# Patient Record
Sex: FEMALE | Race: WHITE | NOT HISPANIC OR LATINO | Employment: OTHER | URBAN - METROPOLITAN AREA
[De-identification: names, ages, dates, MRNs, and addresses within clinical notes are randomized per-mention and may not be internally consistent; named-entity substitution may affect disease eponyms.]

---

## 2017-01-04 RX ORDER — TOPIRAMATE 25 MG/1
TABLET ORAL
Qty: 180 TABLET | Refills: 3 | Status: SHIPPED | OUTPATIENT
Start: 2017-01-04 | End: 2017-10-10 | Stop reason: SDUPTHER

## 2017-01-05 ENCOUNTER — OFFICE VISIT (OUTPATIENT)
Dept: WOUND CARE | Facility: HOSPITAL | Age: 67
End: 2017-01-05
Attending: SURGERY

## 2017-01-05 PROCEDURE — G0463 HOSPITAL OUTPT CLINIC VISIT: HCPCS

## 2017-01-06 PROCEDURE — G0463 HOSPITAL OUTPT CLINIC VISIT: HCPCS

## 2017-01-06 NOTE — WOUND CARE CLINIC NOTE
DATE OF VISIT: 01/05/2017    CHIEF COMPLAINT: Traumatic injury resulting in a wound of the right mid anterior calf (L97.213).    HISTORY OF PRESENT ILLNESS: This is a 66-year-old female who has multiple other medical troubles including need for chronic anticoagulation on Coumadin. She had some trauma to the mid anterior calf in the 1st portion of November. She developed a large hematoma, and initially this was not opened and the physicians that saw her just followed it. It later spontaneously drained, and then later a large eschar formed and then it came off. She was placed on antibiotic of clindamycin for cellulitis on 11/15/2016. It sounds like she has just been using some kind of gauze and cleaning it. Her  apparently has been doing a pretty good job of taking care of it, but no other special treatment. She denies fever, chills, or purulent drainage. She cannot walk that well. She has a lot of other medical troubles. Many of these go back to over 20 years ago when she had a horse accident, and she only walks to the bathroom with severe pain. This has been this way for years. She also has significant lung disease, and she is on prednisone and nasal oxygen. She does deny history of diabetes, but she does have a history of multiple deep venous thromboses of her legs, history of congestive heart failure, and stomach ulcers which are controlled with Protonix.    ALLERGIES:   1. NAPROSYN.   2. LEVAQUIN.   3. MEPERIDINE.   4. PNEUMOCOCCAL VACCINATIONS.   5. SULFA DRUGS.     MEDICATIONS:   1. Allopurinol.   2. Baclofen.  3. Cymbalta.  4. Lasix.  5. Hydrocodone.  6. Levothyroxine.   7. Lisinopril.   8. Reglan.  9. Protonix.  10. K-Dur.  11. Potassium.  12. Zanaflex.  13. Topamax.  14. Coumadin.    REVIEW OF SYSTEMS/PAST MEDICAL HISTORY:   1. As already mentioned in history of present illness.  2. Anemia.  3. Asthma.  4. Hypertension.  5. Venous insufficiency.  6. Multiple pneumonias.  7. Depression.  8.  Degenerative lumbar disease.  9. GERD.  10. Fibromyalgia.  11. Gastric ulcer.  12. Gastroparesis.  13. Low thyroid.   14. Metabolic encephalopathy.  15. Migraines.  16. Neuropathies.  17. DVTs.  18. 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  19. Renal impairment stage II.  20. Sleep apnea on CPAP.     PAST SURGICAL HISTORY:   1. Inferior vena cava filter placement in 2014.  2. Hip surgery.  3. Hysterectomy.     SOCIAL HISTORY: She is  and lives with her , who does take care of her. She denies smoking, alcohol and caffeine.     PHYSICAL EXAMINATION:  GENERAL: This patient is a somewhat debilitated and is moderately wheelchair-bound. She is alert, oriented and cooperative.   VITAL SIGNS: Temperature 97.8, pulse 75, respirations 24, and blood pressure 143/82.   HEENT: Pupils equal, round, and reactive to light. Nose and throat are clear. No masses.   NECK: Nontender. Trachea is in the midline. No thyroid palpable. No carotid bruits.   CHEST: Reveals distant heart distant breath sounds, but no obvious wheezes, rales, or other abnormal sounds.   HEART: Reveals distant sounds but regular rhythm. No obvious murmur.   ABDOMEN: Soft, nontender. No apparent masses.  EXTREMITIES: The patient has bilateral movement of all 4 extremities but does have weakness and disabilities and reduction in her ability to move due to discomfort. She does not have significant neuropathies of her feet. Examination of her lower extremities reveals that both legs have some edema, but there is significant edema on the right side. Left side is not significant. She has palpable pulses faintly. The right leg shows some redness, but there is not cellulitis. The wound is in the center portion of the midportion of the calf on the right leg. There is a lot of slough and necrotic tissue, eschar, and debris apparently down into the muscle. There is some tunneling and undermining on the lateral surface of the wound. The wound measures 6 by 4 x 1 cm.      DESCRIPTION OF PROCEDURE: Informed consent was given for excisional debridement of the wound of her right mid anterior calf to remove the eschar, slough, necrotic tissue and debris down to the level including some muscle, Risk. complications and benefits were explained. Then 4% lidocaine was placed on the wound for topical anesthesia. Using a 4 mm curette, this was selectively debrided, removing a significant amount of eschar debris, slough, and necrotic tissue although the way down and including some muscle. There was some undermining and tunneling on the lateral aspect tunneled approximately 1.5 cm from top to bottom of the wound. This was also cleaned out. There was moderate bleeding due to her anticoagulation with Coumadin, but this was controlled with pressure. The final wound size after debridement was 6 x 4 x 1.2 cm. The total surface area that was debrided was approximately 24 sq cm in this excisional debridement down to the muscle and including muscle. The final wound was much , although there was some still residual debris and eschar that was adherent. The bleeding was controlled with pressure. The patient tolerated it very well.     This patient has a lot of other medical troubles including that she is on prednisone, and it was failed to be mentioned in her history that she also has scleroderma, which was also mot mentioned in the examination, but this was visible on some of her toes. They have the typical scleroderma thin and shiny appearance of digits affected with scleroderma, so scleroderma is also a component of this problem. Hopefully this debridement was significant and most of the debris was removed. I am going to put her on Iodosorb, add foam silver alginate pad with Kerlix and an Ace toes to knees on her right lower extremity. Depending on how she tolerates this, we may want to wrap her up more, but I think this will probably be okay. She, I think, would tolerate more of venous  compression if needed, but she is going to change this dressing every day, so I think that will probably be reasonable to just do this every day and use Ace wraps. We will see her again next week. She is to call if she has any further problems.        Yon Anglin MD  TOR:ben  D:   01/05/2017 18:03:04  T:   01/06/2017 09:01:17  Job ID:   [id number]  Document ID:   [id number]  Rev:  0  cc:

## 2017-01-12 ENCOUNTER — APPOINTMENT (OUTPATIENT)
Dept: WOUND CARE | Facility: HOSPITAL | Age: 67
End: 2017-01-12
Attending: SURGERY

## 2017-01-19 ENCOUNTER — OFFICE VISIT (OUTPATIENT)
Dept: WOUND CARE | Facility: HOSPITAL | Age: 67
End: 2017-01-19
Attending: SURGERY

## 2017-01-20 NOTE — WOUND CARE CLINIC NOTE
Date of Service: 01/19/2017    CHIEF COMPLAINT: Traumatic injury resulting in a wound of the right midanterior calf (L97.213).     HISTORY OF PRESENT ILLNESS: This is a 66-year-old female who has multiple other medical problems including the need for chronic anticoagulation and is on Coumadin. She had some trauma to her mid right anterior calf in the first part of 11/2016, she developed a large hematoma, and initially this was not opened, but it then spontaneously drained. It later had a large eschar form and then it came off. She was placed on antibiotics of clindamycin, cellulitis on 11/15/2016. It sounds like she has just been using some kind of gauze and cleaning it. Her  has apparently been doing a pretty good job of taking care of it, but no other special treatment. She denies fever, chills, or purulent drainage. She cannot walk that well and has a lot of other medical problems. Many of these go back to 20 years or so when she had a horse accident. She says she only walks to the bathroom, and then only with severe pain. She has significant lung disease and she is on prednisone and nasal oxygen. She does deny a history of diabetes, but she does have a history of multiple deep venous thromboses of her legs, history of congestive heart failure, and stomach ulcers which are controlled with Protonix. We debrided this last week and got it fairly clean. We then started her on Iodosorb and silver alginate pads and ACE wraps. She apparently tolerated this well and really did not have any real trouble and denies fever, chills, or signs of infection.     PHYSICAL EXAMINATION:   GENERAL: Somewhat emaciated and frail female on nasal oxygen. She is in no acute distress.   VITAL SIGNS: Temperature is 97.6, pulse 79, respirations 20, blood pressure 165/88.   EXTREMITIES: Her right lower extremity reveals that she has moderate swelling, but less than we saw last time. She has a weakly palpable pulse. The wound actually  is quite improved with much less necrotic tissue and much smaller. There was some significant undermining last week and this week there is really no undermining. The measurements today are 5.1 x 2.9 x 0.5 cm. There is actually hypergranulation tissue beginning to form, but not excessive. There is some debris that needs to be removed and we discussed this issue.     DESCRIPTION OF PROCEDURE: The patient was given informed consent for the necessity for doing an excisional debridement of some necrotic subcutaneous fat, slough, and eschar overlying the wound of her right lower extremity. A topical anesthetic of 4% lidocaine gel was placed, and using a 4 mm curette the wound was debrided, removing eschar, debris, slough, and some necrotic dead fat from the subcutaneous level of the wound. The total wound measurement following debridement was 5.2 x 3 x 0.6 cm. This is slightly larger than it was prior to the debridement. The total surface area of debridement was approximately 15.5 sq cm. The resulting wound was very clean. There was no undermining. The granulation tissue was a little excessive, but not to the point where it needed to be cauterized. I think if it gets worse it will probably need some further cauterization. Bleeding was controlled. The patient tolerated it very well.     ASSESSMENT AND PLAN: This patient has made significant improvement in the past week with TheraHoney and silver foam. We are going to continue that for another week. I am thinking that if next week it looks as good as it does today we may consider using collagen and possibly using Hydrofera Blue, partly because I think the Hydrofera Blue would help control the hypergranulation tissue. We will consider that. If not, we may have to do some silver nitrate treatment to the hypergranulation tissue also, but we would probably go to at least collagen next week.       Yon Anglin MD  TOR:ab  D:   01/19/2017 19:10:16  T:   01/20/2017  17:13:42  Job ID:   [id number]  Document ID:   [id number]  Rev:  0  cc:

## 2017-01-26 ENCOUNTER — OFFICE VISIT (OUTPATIENT)
Dept: WOUND CARE | Facility: HOSPITAL | Age: 67
End: 2017-01-26
Attending: SURGERY

## 2017-01-26 PROCEDURE — G0463 HOSPITAL OUTPT CLINIC VISIT: HCPCS

## 2017-01-27 NOTE — WOUND CARE CLINIC NOTE
DATE OF SERVICE: 01/26/2017     CHIEF COMPLAINT: Traumatic injury resulting in wound of the right mid anterior calf (L97.213).     HISTORY OF PRESENT ILLNESS: This is a 66-year-old female who has multiple other medical problems including the need for chronic anticoagulation, is on Coumadin. She had some trauma to her mid right anterior calf in the first part of  November 2016.  She has developed a large hematoma. Initially this was not opened but then it spontaneously drained. It later had a large eschar develop and then it came off.  She was placed on antibiotics of clindamycin for her cellulitis in November 2016.  It sounds like she was just using some kind of gauze and cleaning it as far as dressing. She really cannot walk a lot due to other medical troubles that go back some 20 years or more.  She said that she only walks  to the bathroom and then only with severe pain. She has significant lung disease and she is on prednisone and nasal oxygen. She does deny history of diabetes, but she does have a history of multiple deep venous thrombosis of her legs, history of congestive heart failure and stomach ulcers which are controlled with Protonix.  We debrided the wound extensively about 2 weeks ago and  it was fairly clean, started her on isosorbide and silver alginate. She tolerated it well.  Then we saw her last week, debrided it again.  It was not nearly as bad. We changed the wound to TheraHoney and silver foam. She returns today saying it feels a lot better.  She has had no real major pain and she had no fever or signs of infection.     PHYSICAL EXAMINATION:   GENERAL:  Reasonably well-nourished and developed female with nasal oxygen in place, in no acute distress.   VITAL SIGNS: Temperature is 98.1, pulse 80, respirations 16, blood pressure 117/80.   EXTREMITIES:  The wound on her right anterior shin, she has significant signs of healing and there is no necrotic tissue. The wound is much smaller. It  measures now 4.3 x 2.2 x 0.1 cm, and this is a significant improvement since last week. There is some beginning budding epithelium.    ASSESSMENT AND PLAN:  The patient did not need debridement today.  It was really very clean and granulating well, showing good signs of early closure. We are going to change her from TheraHoney today. We will start using collagen dressing and foam.  We will do this every 2 days for now and then we will see her back next week. She should wrap it lightly with an Ace wrap. The collagen and  cover with foam is going to be the dressing.         Yon Anglin MD  TOR:antonio  D:   01/26/2017 15:07:10  T:   01/27/2017 11:19:30  Job ID:   [id number]  Document ID:   [id number]  Rev:  1  cc:

## 2017-01-31 ENCOUNTER — TELEPHONE (OUTPATIENT)
Dept: INTERNAL MEDICINE | Facility: CLINIC | Age: 67
End: 2017-01-31

## 2017-01-31 NOTE — TELEPHONE ENCOUNTER
----- Message from Kimberli Nobles MA sent at 1/31/2017 11:13 AM EST -----  Contact: pt - Dr Herron's pt - RE: Order      ----- Message -----     From: Fany Vidal     Sent: 1/31/2017  10:33 AM       To: Kimberli Nobles MA    Pt calling and would like a return call regarding pt's nebulizer. Pt informs has had for several years. Pt informs that [facility] informed [facility] pt will need to machine as pt's nebulizer is to old to get supplies to fix. Pt was informed that  would need to fax order to pharmacy for insurance to cover. Could you please fax order for nebulizer and supplies to pt's pharmacy? Please advise. Thanks    Nebulizer machine -- Pt would like full size machine    [facility]  [address] [place] [address]  Phone # [phone]  Fax # [phone]      Pt #  [phone]

## 2017-02-02 ENCOUNTER — OFFICE VISIT (OUTPATIENT)
Dept: WOUND CARE | Facility: HOSPITAL | Age: 67
End: 2017-02-02
Attending: SURGERY

## 2017-02-02 PROCEDURE — G0463 HOSPITAL OUTPT CLINIC VISIT: HCPCS

## 2017-02-03 NOTE — WOUND CARE CLINIC NOTE
DATE OF SERVICE: 02/02/2017     CHIEF COMPLAINT: Traumatic injury resulting in a wound of right mid anterior calf (L97.213)     HISTORY OF PRESENT ILLNESS: This is a 66-year-old female who has had multiple other medical problems including the need for chronic anticoagulation and is on Coumadin. She had some trauma to her right mid anterior calf the first part of 11//2016. She developed a large hematoma. Initially this was not open but then it spontaneously drained. It later had a large eschar develop and the eschar came off. She was placed on antibiotics of clindamycin for her cellulitis in 11/2016. It sounds like she was just using some kind of gauze and cleaning it as far as a dressing. She cannot really walk a lot due to other medical problems that she has going back for 20 years or more. She said that she only walks to the bathroom and then only with severe pain. She has significant lung disease and is on prednisone and nasal oxygen. She does deny history of diabetes, but does have a history of deep venous thrombosis of her legs, history of congestive heart failure, and stomach ulcers which are controlled with Protonix. We debrided the wound extensively several weeks ago and has remained fairly clean. We used Iodosorb and silver alginate, and then more recently we used TheraHoney.  Last week we began her on collagen, and she claims that she has not had any problems except she said that when she removed the 1st dressing with collagen it was red, irritated, and itched and seemed like she was allergic to it. She then would have used TheraHoney but she then ran out of TheraHoney and then she used Iodosorb.  Other than this she has not had any problems.     PHYSICAL EXAMINATION:  VITAL SIGNS: Temperature is 97.6, pulse 75, respirations 20, blood pressure 140/83.   EXTREMITIES: The lower extremity looks actually better. There is still some swelling of her leg, which has been present and we are only treating with an  Ace.  The wound measures 4.2 x 2.2 x 0.1 cm. The redness is gone that she describes and does not appear there is any irritation, there is really good healthy granulation tissue, and there is really no need today to perform debridement.     ASSESSMENT AND PLAN: What I am thinking is that she may have been given Puracol with silver and she may have had a silver allergy or possibly there was silver in the alginate, and it sounds like she had some type of topical dermatitis. We are going to try it one more time to use just pure collagen and pure alginate without silver and see if she has a similar problem. It was not a severe problem, so I think if she has it she can just stop it, and then she has the TheraHoney and she can go on to TheraHoney.  We gave her some more today, but I am hoping that it was really silver that may have been in the product and she had an allergic response to because I think the collagen actually is a good way to go. If this gives her a problem, then will probably just use a simple alginate. The wound is continuing to heal and is very clean at this time.      Yon Anglin MD  TOR:tp  D:   02/02/2017 18:42:25  T:   02/03/2017 00:33:50  Job ID:   [id number]  Document ID:   [id number]  Rev:  1  cc:

## 2017-02-08 ENCOUNTER — TELEPHONE (OUTPATIENT)
Dept: INTERNAL MEDICINE | Facility: CLINIC | Age: 67
End: 2017-02-08

## 2017-02-08 NOTE — TELEPHONE ENCOUNTER
----- Message from Kimberli Nobles MA sent at 2/8/2017 11:32 AM EST -----      ----- Message -----     From: Ritika Ortiz MA     Sent: 2/8/2017  10:47 AM       To: Kimberli Nobles MA    Pt results called in today    INR 1.9 drawn yesterday

## 2017-02-09 ENCOUNTER — OFFICE VISIT (OUTPATIENT)
Dept: WOUND CARE | Facility: HOSPITAL | Age: 67
End: 2017-02-09
Attending: SURGERY

## 2017-02-09 PROCEDURE — G0463 HOSPITAL OUTPT CLINIC VISIT: HCPCS

## 2017-02-10 NOTE — WOUND CARE CLINIC NOTE
DATE OF SERVICE: 02/09/2017    CHIEF COMPLAINT: Traumatic injury resulting in a wound of right mid anterior calf (L97.213).     HISTORY OF PRESENT ILLNESS: This is a 66-year-old female who has had multiple other medical troubles including the need for chronic anticoagulation and is on Coumadin. She has had some trauma to the right mid anterior calf the first part of 11/2016. She developed a large hematoma. Initially this was not open but then it spontaneously drained. It later had a large eschar develop and the eschar came off. She was placed on antibiotics of clindamycin for cellulitis in 11/2016. It sounds like she was just using some kind of gauze and cleaning it as far as a dressing. She cannot really walk a lot due to other medical problems that she has had that go back more than 20 years. She said that she only walks to the bathroom and in only severe pain. She has significant lung disease and is on prednisone and nasal oxygen. She does deny a history of diabetes, but she does have a history of deep venous thrombosis of her legs, history of congestive heart failure, stomach ulcers which are controlled with Protonix. We debrided the wound extensively several weeks ago and this has remained fairly clean.  We used Iodosorb, silver alginate, and then more recently we used TheraHoney. We did try her on some  collagen but she had some problems, and it is felt that she likely had some collagen with silver and she had SILVER allergy. We have tried to use some other collagen since then, and she comes back saying that she did not have problems this time with the collagen or the alginate that did not contain silver. She has no other problems.     PHYSICAL EXAMINATION:  VITAL SIGNS: Temperature 98, pulse 68, respirations 18, blood pressure 145/74.   EXTREMITIES: Examination of the wound reveals that it continues to improve. There is no drainage, no pus, no redness. There is epithelium that is beginning to creep and  there is epithelium within the wound making it somewhat misleading on the measurement,  but the measurements are 3.9 x 1.5 x 0.1 cm.  As mentioned, it is a little bit hyperplastic but overall there is epithelium climbing onto the wound, and at this point I do not want to really remove any hypergranulation tissue because it is not that bad.    ASSESSMENT AND PLAN:  We are going to continue just using the plain Puracol, the plain alginate, Kerlix and Ace. She is allergic to SILVER, and we do not need to use that anymore. If she cannot get the pure alginate or the Puracol without silver then we have to just let her use TheraHoney, I think it will be fine. Will see her back next week.         Yon Anglin MD  TOR:tp  D:   02/09/2017 20:41:17  T:   02/10/2017 03:05:42  Job ID:   [id number]  Document ID:   [id number]  Rev:  0  cc:

## 2017-02-16 ENCOUNTER — APPOINTMENT (OUTPATIENT)
Dept: WOUND CARE | Facility: HOSPITAL | Age: 67
End: 2017-02-16
Attending: SURGERY

## 2017-02-23 ENCOUNTER — OFFICE VISIT (OUTPATIENT)
Dept: WOUND CARE | Facility: HOSPITAL | Age: 67
End: 2017-02-23
Attending: SURGERY

## 2017-02-23 PROCEDURE — G0463 HOSPITAL OUTPT CLINIC VISIT: HCPCS

## 2017-02-24 NOTE — WOUND CARE CLINIC NOTE
DATE OF SERVICE: 02/23/2017    CHIEF COMPLAINT: Traumatic injury resulting in a wound of right mid anterior calf (L97.213).     HISTORY OF PRESENT ILLNESS: This is a 66-year-old female who has had multiple other medical troubles including the need for chronic anticoagulation and is on Coumadin. She has had trauma to the right mid anterior calf the first part of 11/2016.  She developed a large hematoma. Initially this was not open and then it spontaneously drained. It later had a large eschar develop and the eschar came off. She was placed on antibiotics of clindamycin for cellulitis in 11/2016.  It sounds like she was just using some kind of gauze and cleaning it for dressing. She cannot really walk a lot due to other medical troubles that she has had for a long time that go back 20 years. She said that he only walks to the bathroom and is in severe pain. She has significant lung disease and is on prednisone and nasal oxygen. She does deny a history of diabetes but does have a history of deep venous thrombosis of her legs, history of congestive heart failure, stomach ulcers which are controlled with Protonix. We have debrided the wound extensively early in her visits and this since then has remained fairly clean.  We initially used Iodosorb and then more recently we used TheraHoney. We did try her on some collagen but she had some problems, and it was felt that she had some collagen with silver. We have tried to use other collagen since then and she comes back saying that she did not have any problems with the collagen that did not contain silver. She comes back today with no complaints and no real problems.  No fever or sign of infection.     PHYSICAL EXAMINATION:  VITAL SIGNS: Temperature is 97.5, pulse 69, respirations 18, blood pressure 154/79.   EXTREMITIES: Examination of her right lower extremity reveals that there is no swelling or edema above her normal. The wound is significantly smaller.  It now measures  2.8 x 1.4 x 0.1 cm.  There is good granulation tissue. There is no debris that needs to be removed, and there is thin skin covering the area where the wound was and is  just the central wound that is remaining. There appears to be epithelium also inside the wound that has just not consolidated yet.     ASSESSMENT/PLAN: We are going to continue to treat her with collagen without silver and either use OptiLock or alginate, Kerlix, and Ace wrap, but we definitely need to not use silver.  This has been stressed on several occasions. Will see her back probably next week, and she should be continuing to improve.  I suspect in 2 or 3 weeks she will probably be healed.       Yon Anglin MD  TOR:tp  D:   02/23/2017 20:48:24  T:   02/24/2017 00:13:01  Job ID:   [id number]  Document ID:   [id number]  Rev:  0  cc:

## 2017-03-06 ENCOUNTER — TELEPHONE (OUTPATIENT)
Dept: INTERNAL MEDICINE | Facility: CLINIC | Age: 67
End: 2017-03-06

## 2017-03-06 NOTE — TELEPHONE ENCOUNTER
They need to check PT INR the day of epidural block.  If the epidural block is given he should restart Coumadin that evening.

## 2017-03-06 NOTE — TELEPHONE ENCOUNTER
----- Message from Kimberli Nobles MA sent at 3/6/2017  3:30 PM EST -----  Contact: pt    Please advise on pt getting off of Coumadin    ----- Message -----     From: Elizabeth Dominguez     Sent: 3/6/2017  12:48 PM       To: Kimberli Nobles MA    Pt calling has epidural scheduled for Wednesday. Pt stopped taking coumadin on Friday. Would like to know if that is ok. She would jaguar to know if you would like her to begin coumadin again after she gets her epidural on wednesday. Please advise.     #[phone]

## 2017-03-07 ENCOUNTER — OFFICE VISIT (OUTPATIENT)
Dept: INTERNAL MEDICINE | Facility: CLINIC | Age: 67
End: 2017-03-07

## 2017-03-07 VITALS
SYSTOLIC BLOOD PRESSURE: 140 MMHG | HEART RATE: 79 BPM | DIASTOLIC BLOOD PRESSURE: 88 MMHG | WEIGHT: 250 LBS | OXYGEN SATURATION: 98 % | BODY MASS INDEX: 46.01 KG/M2 | HEIGHT: 62 IN

## 2017-03-07 DIAGNOSIS — Z86.711 HISTORY OF PULMONARY EMBOLISM: ICD-10-CM

## 2017-03-07 DIAGNOSIS — K31.84 GASTROPARESIS: ICD-10-CM

## 2017-03-07 DIAGNOSIS — I48.0 PAF (PAROXYSMAL ATRIAL FIBRILLATION) (HCC): ICD-10-CM

## 2017-03-07 DIAGNOSIS — I87.2 CHRONIC VENOUS INSUFFICIENCY: ICD-10-CM

## 2017-03-07 DIAGNOSIS — G47.33 OBSTRUCTIVE SLEEP APNEA SYNDROME: Primary | ICD-10-CM

## 2017-03-07 DIAGNOSIS — Z86.72 HISTORY OF DEEP VEIN THROMBOPHLEBITIS OF LOWER EXTREMITY: ICD-10-CM

## 2017-03-07 DIAGNOSIS — I10 BENIGN ESSENTIAL HYPERTENSION: ICD-10-CM

## 2017-03-07 DIAGNOSIS — N18.2 CHRONIC RENAL IMPAIRMENT, STAGE 2 (MILD): ICD-10-CM

## 2017-03-07 DIAGNOSIS — K92.2 UPPER GASTROINTESTINAL HEMORRHAGE: ICD-10-CM

## 2017-03-07 DIAGNOSIS — R32 URINARY INCONTINENCE, UNSPECIFIED TYPE: ICD-10-CM

## 2017-03-07 DIAGNOSIS — E03.9 ACQUIRED HYPOTHYROIDISM: ICD-10-CM

## 2017-03-07 DIAGNOSIS — J45.20 MILD INTERMITTENT ASTHMA WITHOUT COMPLICATION: ICD-10-CM

## 2017-03-07 LAB
ALBUMIN SERPL-MCNC: 3.94 G/DL (ref 3.4–4.6)
ALBUMIN/GLOB SERPL: 1.1 G/DL
ALP SERPL-CCNC: 64 U/L (ref 46–116)
ALT SERPL W P-5'-P-CCNC: 17 U/L (ref 14–59)
ANION GAP SERPL CALCULATED.3IONS-SCNC: 11 MMOL/L
AST SERPL-CCNC: 14 U/L (ref 7–37)
BILIRUB SERPL-MCNC: 0.4 MG/DL (ref 0.2–1)
BUN BLD-MCNC: 25 MG/DL (ref 6–22)
BUN/CREAT SERPL: 15 (ref 7–25)
CALCIUM SPEC-SCNC: 9.9 MG/DL (ref 8.6–10.5)
CHLORIDE SERPL-SCNC: 98 MMOL/L (ref 95–107)
CO2 SERPL-SCNC: 31 MMOL/L (ref 23–32)
CREAT BLD-MCNC: 1.67 MG/DL (ref 0.55–1.02)
GFR SERPL CREATININE-BSD FRML MDRD: 31 ML/MIN/1.73
GLOBULIN UR ELPH-MCNC: 3.5 GM/DL
GLUCOSE BLD-MCNC: 104 MG/DL (ref 70–100)
POTASSIUM BLD-SCNC: 4.4 MMOL/L (ref 3.3–5.3)
PROT SERPL-MCNC: 7.4 G/DL (ref 6.3–8.4)
SODIUM BLD-SCNC: 140 MMOL/L (ref 136–145)

## 2017-03-07 PROCEDURE — 99214 OFFICE O/P EST MOD 30 MIN: CPT | Performed by: INTERNAL MEDICINE

## 2017-03-07 PROCEDURE — 80053 COMPREHEN METABOLIC PANEL: CPT | Performed by: INTERNAL MEDICINE

## 2017-03-07 RX ORDER — METOCLOPRAMIDE 10 MG/1
10 TABLET ORAL
Qty: 360 TABLET | Refills: 3 | Status: SHIPPED | OUTPATIENT
Start: 2017-03-07 | End: 2018-06-20 | Stop reason: SDUPTHER

## 2017-03-07 RX ORDER — SULFAMETHOXAZOLE AND TRIMETHOPRIM 800; 160 MG/1; MG/1
1 TABLET ORAL 2 TIMES DAILY
Qty: 14 TABLET | Refills: 0 | Status: SHIPPED | OUTPATIENT
Start: 2017-03-07 | End: 2017-07-11

## 2017-03-07 RX ORDER — LEVOTHYROXINE SODIUM 0.05 MG/1
50 TABLET ORAL DAILY
Qty: 90 TABLET | Refills: 3 | Status: SHIPPED | OUTPATIENT
Start: 2017-03-07 | End: 2018-04-05 | Stop reason: SDUPTHER

## 2017-03-07 RX ORDER — POTASSIUM CHLORIDE 20 MEQ/1
20 TABLET, EXTENDED RELEASE ORAL 2 TIMES DAILY
Qty: 180 TABLET | Refills: 3 | Status: SHIPPED | OUTPATIENT
Start: 2017-03-07 | End: 2018-06-20 | Stop reason: SDUPTHER

## 2017-03-07 RX ORDER — DULOXETIN HYDROCHLORIDE 60 MG/1
60 CAPSULE, DELAYED RELEASE ORAL DAILY
Qty: 90 CAPSULE | Refills: 3 | Status: SHIPPED | OUTPATIENT
Start: 2017-03-07 | End: 2018-04-05 | Stop reason: SDUPTHER

## 2017-03-07 RX ORDER — PREDNISONE 1 MG/1
5 TABLET ORAL DAILY
Qty: 90 TABLET | Refills: 3 | Status: SHIPPED | OUTPATIENT
Start: 2017-03-07 | End: 2018-06-20 | Stop reason: SDUPTHER

## 2017-03-07 RX ORDER — BACLOFEN 10 MG/1
10 TABLET ORAL 2 TIMES DAILY
Qty: 180 TABLET | Refills: 3 | Status: SHIPPED | OUTPATIENT
Start: 2017-03-07 | End: 2018-08-23 | Stop reason: SDUPTHER

## 2017-03-07 NOTE — PROGRESS NOTES
Subjective   Alison Colvin is a 66 y.o. female.     History of Present Illness she is in today for follow-up of hypertension, chronic anemia, chronic renal insufficiency, obstructive sleep apnea, chronic hypoxia secondary to restrictive lung disease, and lumbar spine degenerative joint and degenerative disc disease.  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  However she will receive epidural blocks for 3 consecutive weeks starting tomorrow.  She has been off Coumadin for 5 days and will restart tomorrow evening after her injection.    Her only complaint is that of dysuria for the last 3 or 4 months on a regular basis.  She has chronic and stable 2 per night nocturia.  She denied any fever sweats or chills.  There has been no abdominal pain.  She denied any nausea or vomiting.  She denied any melanotic stool or rectal bleeding.  She is using CPAP nightly.  She is fairly stable from a respiratory standpoint.  She denies any wheezing or coughing.  There has been no dyspnea or chest pain.  She denied any PND.  She uses CPAP nightly.  Her only real complaint otherwise is that of fatigue which is a chronic problem for her.        Review of Systems   Constitutional: Positive for fatigue. Negative for activity change, appetite change, chills, diaphoresis and fever.   HENT: Negative for trouble swallowing.    Respiratory: Negative for cough, chest tightness, shortness of breath and wheezing.    Cardiovascular: Negative for chest pain, palpitations and leg swelling.   Gastrointestinal: Negative for abdominal pain, anal bleeding, blood in stool, constipation, diarrhea, nausea and vomiting.   Genitourinary: Positive for dysuria and frequency. Negative for difficulty urinating, flank pain and hematuria.   Musculoskeletal: Positive for back pain. Negative for arthralgias.   Neurological: Negative for dizziness, syncope, facial asymmetry, speech difficulty, weakness and  headaches.   Psychiatric/Behavioral: Negative for confusion and dysphoric mood. The patient is not nervous/anxious.        Objective   Physical Exam   Constitutional: She is oriented to person, place, and time. She appears well-developed and well-nourished. She is active. She does not appear ill.   Eyes: Conjunctivae are normal.   Fundoscopic exam:       The right eye shows no AV nicking, no exudate and no hemorrhage.        The left eye shows no AV nicking, no exudate and no hemorrhage.   Neck: Carotid bruit is not present. No thyroid mass and no thyromegaly present.   Cardiovascular: Normal rate, regular rhythm, S1 normal and S2 normal.  Exam reveals no S3 and no S4.    No murmur heard.  Pulmonary/Chest: No tachypnea. No respiratory distress. She has no decreased breath sounds. She has no wheezes. She has no rhonchi. She has no rales.   Abdominal: Soft. Normal appearance. There is no tenderness.       Vascular Status -  Her exam exhibits no right foot edema. Her exam exhibits no left foot edema.  Neurological: She is alert and oriented to person, place, and time.   Psychiatric: She has a normal mood and affect. Her speech is normal and behavior is normal. Judgment and thought content normal. Cognition and memory are normal.       Assessment/Plan  November lab showed a hematocrit of 35.2 and hemoglobin of 10.5 with an MCV of 93.4.  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 4 chronic renal insufficiency-relatively mild and stable #5 chronic anemia-stable and without sign of recurrent GI bleeding #6 obstructive sleep apnea-compliant by history #7 chronic hypoxia-stable with continuous oxygen supplementation.    Plan #1 no change regular medication #2 Bactrim double strength 1 twice a day for 7 days #3 CBC, CMP, urine culture and sensitivity  today.  Routine follow-up with me in 4 months.

## 2017-03-08 LAB
BASOPHILS # BLD AUTO: 0.1 X10E3/UL (ref 0–0.2)
BASOPHILS NFR BLD AUTO: 1 %
EOSINOPHIL # BLD AUTO: 0.5 X10E3/UL (ref 0–0.4)
EOSINOPHIL # BLD AUTO: 5 %
ERYTHROCYTE [DISTWIDTH] IN BLOOD BY AUTOMATED COUNT: 15.5 % (ref 12.3–15.4)
HCT VFR BLD AUTO: 34.1 % (ref 34–46.6)
HGB BLD-MCNC: 10.9 G/DL (ref 11.1–15.9)
IMM GRANULOCYTES # BLD: 0 X10E3/UL (ref 0–0.1)
IMM GRANULOCYTES NFR BLD: 0 %
LYMPHOCYTES # BLD AUTO: 2.3 X10E3/UL (ref 0.7–3.1)
LYMPHOCYTES NFR BLD AUTO: 23 %
MCH RBC QN AUTO: 28.5 PG (ref 26.6–33)
MCHC RBC AUTO-ENTMCNC: 32 G/DL (ref 31.5–35.7)
MCV RBC AUTO: 89 FL (ref 79–97)
MONOCYTES # BLD AUTO: 0.8 X10E3/UL (ref 0.1–0.9)
MONOCYTES NFR BLD AUTO: 8 %
NEUTROPHILS # BLD AUTO: 6.1 X10E3/UL (ref 1.4–7)
NEUTROPHILS NFR BLD AUTO: 63 %
PLATELET # BLD AUTO: 153 X10E3/UL (ref 150–379)
RBC # BLD AUTO: 3.83 X10E6/UL (ref 3.77–5.28)
WBC # BLD AUTO: 9.7 X10E3/UL (ref 3.4–10.8)

## 2017-03-09 ENCOUNTER — OFFICE VISIT (OUTPATIENT)
Dept: WOUND CARE | Facility: HOSPITAL | Age: 67
End: 2017-03-09
Attending: SURGERY

## 2017-03-09 PROCEDURE — G0463 HOSPITAL OUTPT CLINIC VISIT: HCPCS

## 2017-03-09 NOTE — WOUND CARE CLINIC NOTE
DATE OF SERVICE: 03/09/2017    CHIEF COMPLAINT: Traumatic injury resulting in a wound to the right mid anterior calf (L97.213).     HISTORY OF PRESENT ILLNESS: The patient is a 66-year-old female who has had multiple other medical troubles including the need for chronic anticoagulation and is on Coumadin. She had trauma to the right mid anterior calf the first part of 11/2016. She developed a large hematoma. Initially this was not open, but then it drained spontaneously. It later had a large eschar, but then the eschar came off. She was placed on antibiotics of clindamycin for cellulitis in 11/2016. It sounds like she was just using some kind of gauze and cleaning it for dressing. She cannot really walk a lot due to other medical troubles. She said that she only walks to the bathroom and is in severe pain when she does so. She has significant lung disease and is on prednisone and nasal oxygen. She does deny a history of diabetes, but does have a history of deep venous thrombosis of her legs and a history of congestive heart failure and stomach ulcers which are controlled with Protonix. We had debrided the wound extensively early in her visits and this has remained fairly clean. We have been using TheraHoney and we changed to a collagen, but we are careful not to cover this with anything that has silver in it, since she seems to have silver allergy. She denies any fever, chills, or problems, and is being seen every 2 weeks at her request.     PHYSICAL EXAMINATION:   VITAL SIGNS: Temperature is 97.1, pulse 64, respirations 20, blood pressure 148/87.   SKIN: Examination of the wound reveals that it is considerably smaller. It measures 1.4 x 1 x 0.1 cm, which is significantly smaller than last time we saw her 2 weeks ago. The wound is superficial and is clean. There is no necrotic tissue and does not need to be debrided.     ASSESSMENT AND PLAN: I think we are just going to continue the same plan with the collagen  and dress with either alginate and/or OptiLock without silver. We will see her again in about 2 weeks at her request. I think this is getting much smaller and should be healed in the next 2-3 weeks.       Yon Anglin MD  TOR:morelia  D:   03/09/2017 13:37:04  T:   03/09/2017 18:08:35  Job ID:   [id number]  Document ID:   [id number]  Rev:  0  cc:

## 2017-03-10 LAB
BACTERIA UR CULT: ABNORMAL
Lab: ABNORMAL
SUSCEPTIBILITY TESTING: ABNORMAL

## 2017-03-10 RX ORDER — NITROFURANTOIN MACROCRYSTALS 100 MG/1
100 CAPSULE ORAL 3 TIMES DAILY
Qty: 15 CAPSULE | Refills: 0 | Status: SHIPPED | OUTPATIENT
Start: 2017-03-10 | End: 2017-07-11

## 2017-03-15 ENCOUNTER — TELEPHONE (OUTPATIENT)
Dept: INTERNAL MEDICINE | Facility: CLINIC | Age: 67
End: 2017-03-15

## 2017-03-15 NOTE — TELEPHONE ENCOUNTER
----- Message from Fany Vidal sent at 3/15/2017 10:41 AM EDT -----  Contact: Marge Valencia MD INR - Dr Herron's pt - RE: INR out of range  Marge Valencia MD INR calling and would like to inform of an out of range INR of        INR 0.9      Marge Valencia  # [phone]

## 2017-03-22 ENCOUNTER — TELEPHONE (OUTPATIENT)
Dept: INTERNAL MEDICINE | Facility: CLINIC | Age: 67
End: 2017-03-22

## 2017-03-22 NOTE — TELEPHONE ENCOUNTER
----- Message from Lin Kirkland sent at 3/22/2017 11:58 AM EDT -----  Contact: MD DIAZ  Out of range INR drawn yesterday (home test) 0.9.  Please advise.     Patient:  [phone] (M)    Pt is on 5 mg on M,W,F and 2.5 on the other days .   Pt stopped her warfairn due to her epidural injection which is today and she said that she been advised to go back on warfairn after the shot.     Please advice

## 2017-03-22 NOTE — TELEPHONE ENCOUNTER
Her PT/INR is appropriately 1 off Coumadin for her epidural blocks.  She is to resume her Coumadin as per our discussion at last visit tonight.

## 2017-03-23 ENCOUNTER — APPOINTMENT (OUTPATIENT)
Dept: WOUND CARE | Facility: HOSPITAL | Age: 67
End: 2017-03-23
Attending: SURGERY

## 2017-03-29 ENCOUNTER — TELEPHONE (OUTPATIENT)
Dept: INTERNAL MEDICINE | Facility: CLINIC | Age: 67
End: 2017-03-29

## 2017-04-05 ENCOUNTER — TELEPHONE (OUTPATIENT)
Dept: INTERNAL MEDICINE | Facility: CLINIC | Age: 67
End: 2017-04-05

## 2017-04-05 NOTE — TELEPHONE ENCOUNTER
Patient's current  Coumadin dose is     5MG Monday, Wednesday, Friday.    2.5 MG on The rest of the days.    However Mrs. Colvin says that she accidentally took a 5MG tablet on Thursday.

## 2017-04-05 NOTE — TELEPHONE ENCOUNTER
Decrease Coumadin to 5 mg on Mondays and Fridays with 2.5 mg the other 5 days of the week.  Repeat PT/INR in one week.

## 2017-04-19 ENCOUNTER — TELEPHONE (OUTPATIENT)
Dept: INTERNAL MEDICINE | Facility: CLINIC | Age: 67
End: 2017-04-19

## 2017-04-19 NOTE — TELEPHONE ENCOUNTER
----- Message from Dillon Herron Jr., MD sent at 4/19/2017 12:13 PM EDT -----  PT/INRs appropriate.  No change in Coumadin dose.

## 2017-05-03 ENCOUNTER — TELEPHONE (OUTPATIENT)
Dept: INTERNAL MEDICINE | Facility: CLINIC | Age: 67
End: 2017-05-03

## 2017-05-10 ENCOUNTER — TELEPHONE (OUTPATIENT)
Dept: INTERNAL MEDICINE | Facility: CLINIC | Age: 67
End: 2017-05-10

## 2017-05-18 ENCOUNTER — TELEPHONE (OUTPATIENT)
Dept: INTERNAL MEDICINE | Facility: CLINIC | Age: 67
End: 2017-05-18

## 2017-05-24 ENCOUNTER — TELEPHONE (OUTPATIENT)
Dept: INTERNAL MEDICINE | Facility: CLINIC | Age: 67
End: 2017-05-24

## 2017-06-07 ENCOUNTER — TELEPHONE (OUTPATIENT)
Dept: INTERNAL MEDICINE | Facility: CLINIC | Age: 67
End: 2017-06-07

## 2017-06-07 NOTE — TELEPHONE ENCOUNTER
----- Message from Ritika Ortiz MA sent at 6/7/2017 11:54 AM EDT -----  Pt out of range lab drawn yesterday    INR 3.5

## 2017-06-08 ENCOUNTER — TELEPHONE (OUTPATIENT)
Dept: INTERNAL MEDICINE | Facility: CLINIC | Age: 67
End: 2017-06-08

## 2017-06-08 NOTE — TELEPHONE ENCOUNTER
----- Message from Dillon Herron Jr., MD sent at 6/7/2017 11:54 AM EDT -----  PT/INR is upper limit of normal for pulmonary embolism.  Continue to repeat PT/INR daily and let me know the results.

## 2017-06-12 ENCOUNTER — TELEPHONE (OUTPATIENT)
Dept: INTERNAL MEDICINE | Facility: CLINIC | Age: 67
End: 2017-06-12

## 2017-06-12 NOTE — TELEPHONE ENCOUNTER
Increase Coumadin to 5 mg Mondays Wednesdays and Fridays with 2.5 mg Tuesdays Thursdays Saturdays and Sundays.  Repeat PT/INR in one week.

## 2017-06-12 NOTE — TELEPHONE ENCOUNTER
----- Message from Lin Kirkland sent at 6/12/2017 11:52 AM EDT -----  Contact: Aruna Valdovinos with [facility] called with INR value on patient, drawn Friday 06/09/2017.      INR 1.7.  Please advise.     Patient:  [phone]

## 2017-06-21 ENCOUNTER — TELEPHONE (OUTPATIENT)
Dept: INTERNAL MEDICINE | Facility: CLINIC | Age: 67
End: 2017-06-21

## 2017-06-21 NOTE — TELEPHONE ENCOUNTER
----- Message from Dillon Herron Jr., MD sent at 6/21/2017 12:33 PM EDT -----  No change in Coumadin dose

## 2017-06-21 NOTE — TELEPHONE ENCOUNTER
Called pt and she said that her correct dose of Coumadin is 5 mg Monday, Wednesday and Friday's.

## 2017-06-28 ENCOUNTER — TELEPHONE (OUTPATIENT)
Dept: INTERNAL MEDICINE | Facility: CLINIC | Age: 67
End: 2017-06-28

## 2017-06-28 NOTE — TELEPHONE ENCOUNTER
----- Message from Ritika Ortiz MA sent at 6/28/2017 11:37 AM EDT -----  INR drawn yesterday-reported out of range today    INR 3.5

## 2017-06-28 NOTE — TELEPHONE ENCOUNTER
Current Coumadin dose is 5 mg Monday, Wednesday and Friday with 2.5 mg on Tuesday's, Thursday's, Saturday's and Sunday's.

## 2017-07-11 ENCOUNTER — OFFICE VISIT (OUTPATIENT)
Dept: INTERNAL MEDICINE | Facility: CLINIC | Age: 67
End: 2017-07-11

## 2017-07-11 VITALS
DIASTOLIC BLOOD PRESSURE: 70 MMHG | WEIGHT: 250 LBS | HEIGHT: 62 IN | BODY MASS INDEX: 46.01 KG/M2 | SYSTOLIC BLOOD PRESSURE: 98 MMHG

## 2017-07-11 DIAGNOSIS — J45.20 MILD INTERMITTENT ASTHMA WITHOUT COMPLICATION: ICD-10-CM

## 2017-07-11 DIAGNOSIS — G47.33 OBSTRUCTIVE SLEEP APNEA SYNDROME: ICD-10-CM

## 2017-07-11 DIAGNOSIS — I48.0 PAF (PAROXYSMAL ATRIAL FIBRILLATION) (HCC): ICD-10-CM

## 2017-07-11 DIAGNOSIS — E03.9 ACQUIRED HYPOTHYROIDISM: ICD-10-CM

## 2017-07-11 DIAGNOSIS — K25.7 CHRONIC GASTRIC ULCER: ICD-10-CM

## 2017-07-11 DIAGNOSIS — N18.2 CHRONIC RENAL IMPAIRMENT, STAGE 2 (MILD): ICD-10-CM

## 2017-07-11 DIAGNOSIS — D63.8 ANEMIA OF CHRONIC DISORDER: Primary | ICD-10-CM

## 2017-07-11 DIAGNOSIS — I87.2 CHRONIC VENOUS INSUFFICIENCY: ICD-10-CM

## 2017-07-11 DIAGNOSIS — I10 BENIGN ESSENTIAL HYPERTENSION: ICD-10-CM

## 2017-07-11 DIAGNOSIS — K92.2 UPPER GASTROINTESTINAL HEMORRHAGE: ICD-10-CM

## 2017-07-11 DIAGNOSIS — N18.30 CHRONIC RENAL IMPAIRMENT, STAGE 3 (MODERATE) (HCC): ICD-10-CM

## 2017-07-11 LAB
BASOPHILS # BLD AUTO: 0.05 10*3/MM3 (ref 0–0.2)
BASOPHILS NFR BLD AUTO: 0.6 % (ref 0–2)
DEPRECATED RDW RBC AUTO: 49.6 FL (ref 37–54)
EOSINOPHIL # BLD AUTO: 0.45 10*3/MM3 (ref 0–0.7)
EOSINOPHIL NFR BLD AUTO: 5.2 % (ref 0–5)
ERYTHROCYTE [DISTWIDTH] IN BLOOD BY AUTOMATED COUNT: 14.5 % (ref 11.5–15)
HCT VFR BLD AUTO: 34.9 % (ref 34.1–44.9)
HGB BLD-MCNC: 10.5 G/DL (ref 11.2–15.7)
LYMPHOCYTES # BLD AUTO: 1.75 10*3/MM3 (ref 0.8–7)
LYMPHOCYTES NFR BLD AUTO: 20.3 % (ref 10–60)
MCH RBC QN AUTO: 29.4 PG (ref 26–34)
MCHC RBC AUTO-ENTMCNC: 30.1 G/DL (ref 31–37)
MCV RBC AUTO: 97.8 FL (ref 80–100)
MONOCYTES # BLD AUTO: 0.78 10*3/MM3 (ref 0–1)
MONOCYTES NFR BLD AUTO: 9 % (ref 0–13)
NEUTROPHILS # BLD AUTO: 5.6 10*3/MM3 (ref 1–11)
NEUTROPHILS NFR BLD AUTO: 64.9 % (ref 30–85)
PLATELET # BLD AUTO: 167 10*3/MM3 (ref 150–450)
PMV BLD AUTO: 10.9 FL (ref 6–12)
RBC # BLD AUTO: 3.57 10*6/MM3 (ref 3.93–5.22)
WBC NRBC COR # BLD: 8.63 10*3/MM3 (ref 5–10)

## 2017-07-11 PROCEDURE — 84443 ASSAY THYROID STIM HORMONE: CPT | Performed by: INTERNAL MEDICINE

## 2017-07-11 PROCEDURE — 85025 COMPLETE CBC W/AUTO DIFF WBC: CPT | Performed by: INTERNAL MEDICINE

## 2017-07-11 PROCEDURE — 80048 BASIC METABOLIC PNL TOTAL CA: CPT | Performed by: INTERNAL MEDICINE

## 2017-07-11 PROCEDURE — 99213 OFFICE O/P EST LOW 20 MIN: CPT | Performed by: INTERNAL MEDICINE

## 2017-07-11 PROCEDURE — 36415 COLL VENOUS BLD VENIPUNCTURE: CPT | Performed by: INTERNAL MEDICINE

## 2017-07-11 NOTE — PROGRESS NOTES
Subjective   Alison Colvin is a 66 y.o. female.     History of Present Illness she is here today for follow-up of chronic anemia, chronic renal insufficiency, hypertension, asthma, history of GI bleeding, history of pulmonary embolism, chronic venous insufficiency, and restrictive lung disease.  She uses CPAP nightly but not during the daytime when napping which she does fairly frequently.  She complains of fatigue but this has been an ongoing problem for several years.  As usual also unfortunately she has chronic occipital area and neck pain as well as low back pain.  She sees pain management for this.  She denies any PND or chest pain.  She is on oxygen continuously.  She denies any cough or persistent wheezing.  The swelling in her legs has been in abeyance for at least 2 months.  She denies any abdominal pain, melanotic stool, or rectal bleeding.        Review of Systems   Constitutional: Positive for fatigue. Negative for activity change, appetite change and fever.   Respiratory: Positive for shortness of breath. Negative for cough and chest tightness.    Cardiovascular: Negative for chest pain, palpitations and leg swelling.   Gastrointestinal: Negative for abdominal pain, anal bleeding, blood in stool, diarrhea, nausea and vomiting.   Genitourinary: Negative for flank pain and hematuria.   Musculoskeletal: Positive for arthralgias, back pain, gait problem and neck pain.   Neurological: Negative for dizziness, syncope, facial asymmetry, speech difficulty and weakness.       Objective   Physical Exam   Constitutional: She is oriented to person, place, and time. Vital signs are normal. She appears well-developed and well-nourished. She is active. She does not appear ill.   Eyes: Conjunctivae are normal.   Cardiovascular: Normal rate, regular rhythm, S1 normal and S2 normal.  Exam reveals distant heart sounds. Exam reveals no S3 and no S4.    No murmur heard.  Pulmonary/Chest: No tachypnea. No respiratory  distress. She has no decreased breath sounds. She has no wheezes. She has no rhonchi. She has no rales.   Abdominal: Soft. Normal appearance. There is no tenderness.       Vascular Status -  Her exam exhibits no right foot edema. Her exam exhibits no left foot edema.  Neurological: She is alert and oriented to person, place, and time.   Psychiatric: She has a normal mood and affect. Her speech is normal and behavior is normal. Judgment and thought content normal. Cognition and memory are normal.       Assessment/Plan blood pressure by me is 106/68 with use of a thigh cuff.  Recent hematocrit 34.1 hemoglobin 10.9 with an MCV of 89.  BUN 25 creatinine 1.67    Assessment #1 hypertension-controlled with Lasix alone #2 asthma-compensated #3 restrictive lung disease-this has much to do with her chronic hypoxia with alveolar  Hypoventilation #4 history of GI bleeding-without overt recurrence #5 history of pulmonary embolism and deep vein thrombophlebitis-without recurrence on Coumadin #6 chronic renal insufficiency-mild to moderate and fairly stable over the last several years.    Plan #1 no change medication #2 CBC, BMP, TSH today.  Routine follow-up with me in 4 months.

## 2017-07-12 LAB
ANION GAP SERPL CALCULATED.3IONS-SCNC: 10 MMOL/L
BUN BLD-MCNC: 21 MG/DL (ref 6–22)
BUN/CREAT SERPL: 12.8 (ref 7–25)
CALCIUM SPEC-SCNC: 9.6 MG/DL (ref 8.6–10.5)
CHLORIDE SERPL-SCNC: 101 MMOL/L (ref 95–107)
CO2 SERPL-SCNC: 30 MMOL/L (ref 23–32)
CREAT BLD-MCNC: 1.64 MG/DL (ref 0.55–1.02)
GFR SERPL CREATININE-BSD FRML MDRD: 31 ML/MIN/1.73
GLUCOSE BLD-MCNC: 98 MG/DL (ref 70–100)
POTASSIUM BLD-SCNC: 3.9 MMOL/L (ref 3.3–5.3)
SODIUM BLD-SCNC: 141 MMOL/L (ref 136–145)
TSH SERPL DL<=0.05 MIU/L-ACNC: 1.91 MIU/ML (ref 0.4–4.2)

## 2017-08-08 ENCOUNTER — TELEPHONE (OUTPATIENT)
Dept: INTERNAL MEDICINE | Facility: CLINIC | Age: 67
End: 2017-08-08

## 2017-08-08 NOTE — TELEPHONE ENCOUNTER
----- Message from Lin Kirkland sent at 8/8/2017  3:45 PM EDT -----  Contact: Stacy Herron patient    [place] with [facility] called with patient's INR drawn today, 3.3.      Please advise patient.      Patient:  [phone] (M)

## 2017-08-08 NOTE — TELEPHONE ENCOUNTER
Current Coumadin dose is 5 mg Monday, Wednesday and Friday with 2.5 mg on Tuesday's, Thursday's, Saturday's and Sunday's.          Spoke with Melida AMOR after hours. Notified patient to please take Coumadin 5 mg Monday and Wednesday, 2.5 mg Tuesday, THursday, Friday, Saturday and Sunday. Patient verbalized understanding with a recheck in 2 weeks. Patient states she has to check every week due to insurance. Told patient that will be fine call our office once she has checked in 1 week so we can evaluate.

## 2017-08-22 ENCOUNTER — TELEPHONE (OUTPATIENT)
Dept: INTERNAL MEDICINE | Facility: CLINIC | Age: 67
End: 2017-08-22

## 2017-08-22 NOTE — TELEPHONE ENCOUNTER
----- Message from Dillon Herron Jr., MD sent at 8/22/2017  4:11 PM EDT -----  No change in Coumadin dose

## 2017-08-30 ENCOUNTER — TELEPHONE (OUTPATIENT)
Dept: INTERNAL MEDICINE | Facility: CLINIC | Age: 67
End: 2017-08-30

## 2017-08-30 NOTE — TELEPHONE ENCOUNTER
Increase Coumadin to 5 mg Mondays Wednesdays and Fridays with 2.5 mg Tuesdays Thursdays Saturdays and Sundays.

## 2017-08-30 NOTE — TELEPHONE ENCOUNTER
Current Coumadin dose.,,      Coumadin 5 mg Monday and Wednesday, 2.5 mg Tuesday, THursday, Friday, Saturday and Sunday.

## 2017-09-19 ENCOUNTER — TELEPHONE (OUTPATIENT)
Dept: INTERNAL MEDICINE | Facility: CLINIC | Age: 67
End: 2017-09-19

## 2017-09-19 NOTE — TELEPHONE ENCOUNTER
----- Message from Elizabeth Dominguez sent at 9/19/2017  2:50 PM EDT -----  Contact: Maddison from [facility]  Maddison calling to give out of range inr    Inr 3.3    #[phone]      CURRENT COUMADIN DOSE IS...    Coumadin 5 mg Monday and Wednesday, 2.5 mg Tuesday, THursday, Friday, Saturday and Sunday.

## 2017-09-27 ENCOUNTER — TELEPHONE (OUTPATIENT)
Dept: INTERNAL MEDICINE | Facility: CLINIC | Age: 67
End: 2017-09-27

## 2017-09-27 NOTE — TELEPHONE ENCOUNTER
----- Message from Dillon Herron Jr., MD sent at 9/27/2017 12:04 PM EDT -----  No change in Coumadin dose.  PT/INR 1 week.

## 2017-10-11 RX ORDER — ALLOPURINOL 300 MG/1
TABLET ORAL
Qty: 90 TABLET | Refills: 3 | Status: SHIPPED | OUTPATIENT
Start: 2017-10-11 | End: 2018-11-29 | Stop reason: SDUPTHER

## 2017-10-11 RX ORDER — PANTOPRAZOLE SODIUM 40 MG/1
TABLET, DELAYED RELEASE ORAL
Qty: 180 TABLET | Refills: 3 | Status: SHIPPED | OUTPATIENT
Start: 2017-10-11 | End: 2018-11-29 | Stop reason: SDUPTHER

## 2017-10-11 RX ORDER — LISINOPRIL 5 MG/1
TABLET ORAL
Qty: 90 TABLET | Refills: 3 | Status: SHIPPED | OUTPATIENT
Start: 2017-10-11 | End: 2018-11-29 | Stop reason: SDUPTHER

## 2017-10-11 RX ORDER — FUROSEMIDE 40 MG/1
TABLET ORAL
Qty: 90 TABLET | Refills: 3 | Status: SHIPPED | OUTPATIENT
Start: 2017-10-11 | End: 2018-11-29 | Stop reason: SDUPTHER

## 2017-10-11 RX ORDER — TOPIRAMATE 25 MG/1
TABLET ORAL
Qty: 180 TABLET | Refills: 3 | Status: SHIPPED | OUTPATIENT
Start: 2017-10-11 | End: 2018-11-29 | Stop reason: SDUPTHER

## 2017-10-11 RX ORDER — WARFARIN SODIUM 5 MG/1
TABLET ORAL
Qty: 90 TABLET | Refills: 3 | Status: SHIPPED | OUTPATIENT
Start: 2017-10-11 | End: 2019-06-12

## 2017-10-13 RX ORDER — TIZANIDINE 4 MG/1
TABLET ORAL
Qty: 180 TABLET | Refills: 1 | Status: SHIPPED | OUTPATIENT
Start: 2017-10-13 | End: 2018-04-05 | Stop reason: SDUPTHER

## 2017-10-19 ENCOUNTER — TELEPHONE (OUTPATIENT)
Dept: INTERNAL MEDICINE | Facility: CLINIC | Age: 67
End: 2017-10-19

## 2017-10-19 NOTE — TELEPHONE ENCOUNTER
----- Message from Kourtney Lovelace sent at 10/19/2017  9:56 AM EDT -----  Contact: ruth with [facility]  Calling with INR   3.1        10/18/17    #[phone]

## 2017-11-02 ENCOUNTER — TELEPHONE (OUTPATIENT)
Dept: INTERNAL MEDICINE | Facility: CLINIC | Age: 67
End: 2017-11-02

## 2017-11-02 NOTE — TELEPHONE ENCOUNTER
----- Message from Dillon Herron Jr., MD sent at 11/1/2017  4:05 PM EDT -----  No change in Coumadin dose

## 2017-11-08 ENCOUNTER — TELEPHONE (OUTPATIENT)
Dept: INTERNAL MEDICINE | Facility: CLINIC | Age: 67
End: 2017-11-08

## 2017-11-08 NOTE — TELEPHONE ENCOUNTER
----- Message from Lin Kirkland sent at 11/8/2017  9:46 AM EST -----  Contact: Quin Tsai, [facility], called with patient's INR results drawn at 8 PM last night.      INR:  1.7; no PT done.      Please advise.     Patient:  [phone] (M)                [phone] (W)

## 2017-11-09 ENCOUNTER — TELEPHONE (OUTPATIENT)
Dept: INTERNAL MEDICINE | Facility: CLINIC | Age: 67
End: 2017-11-09

## 2017-11-09 NOTE — TELEPHONE ENCOUNTER
Spoke with Mrs Colvin and informed her Dr Herron advised that she should increase her coumadin to 5mg on Tuesday, Thursday, Saturday, and Sunday.  On Monday, Wednesday and Friday she is to take 2.5mg.  Advised her to recheck PT and INR in one week then contact our office with reading.

## 2017-12-01 ENCOUNTER — OFFICE VISIT (OUTPATIENT)
Dept: INTERNAL MEDICINE | Facility: CLINIC | Age: 67
End: 2017-12-01

## 2017-12-01 VITALS
SYSTOLIC BLOOD PRESSURE: 98 MMHG | DIASTOLIC BLOOD PRESSURE: 54 MMHG | WEIGHT: 250 LBS | HEIGHT: 62 IN | BODY MASS INDEX: 46.01 KG/M2

## 2017-12-01 DIAGNOSIS — G43.019 INTRACTABLE MIGRAINE WITHOUT AURA AND WITHOUT STATUS MIGRAINOSUS: ICD-10-CM

## 2017-12-01 DIAGNOSIS — J45.40 MODERATE PERSISTENT ASTHMA WITHOUT COMPLICATION: Primary | ICD-10-CM

## 2017-12-01 DIAGNOSIS — N18.30 CHRONIC RENAL IMPAIRMENT, STAGE 3 (MODERATE) (HCC): ICD-10-CM

## 2017-12-01 DIAGNOSIS — K31.84 GASTROPARESIS: ICD-10-CM

## 2017-12-01 DIAGNOSIS — G47.34 CHRONIC INTERMITTENT HYPOXIA WITH OBSTRUCTIVE SLEEP APNEA: ICD-10-CM

## 2017-12-01 DIAGNOSIS — K25.4 CHRONIC GASTRIC ULCER WITH HEMORRHAGE: ICD-10-CM

## 2017-12-01 DIAGNOSIS — Z86.711 HISTORY OF PULMONARY EMBOLISM: ICD-10-CM

## 2017-12-01 DIAGNOSIS — K26.9 DUODENAL ULCER: ICD-10-CM

## 2017-12-01 DIAGNOSIS — Z23 FLU VACCINE NEED: ICD-10-CM

## 2017-12-01 DIAGNOSIS — I87.2 CHRONIC VENOUS INSUFFICIENCY: ICD-10-CM

## 2017-12-01 DIAGNOSIS — I48.0 PAF (PAROXYSMAL ATRIAL FIBRILLATION) (HCC): ICD-10-CM

## 2017-12-01 DIAGNOSIS — G47.33 CHRONIC INTERMITTENT HYPOXIA WITH OBSTRUCTIVE SLEEP APNEA: ICD-10-CM

## 2017-12-01 DIAGNOSIS — M1A.9XX1 GOUT WITH TOPHUS: ICD-10-CM

## 2017-12-01 DIAGNOSIS — D63.8 ANEMIA OF CHRONIC DISORDER: ICD-10-CM

## 2017-12-01 DIAGNOSIS — I10 BENIGN ESSENTIAL HYPERTENSION: ICD-10-CM

## 2017-12-01 DIAGNOSIS — K92.2 UPPER GASTROINTESTINAL HEMORRHAGE: ICD-10-CM

## 2017-12-01 DIAGNOSIS — E03.9 ACQUIRED HYPOTHYROIDISM: ICD-10-CM

## 2017-12-01 LAB
ALBUMIN SERPL-MCNC: 4.5 G/DL (ref 3.5–5.2)
ALBUMIN/GLOB SERPL: 1.4 G/DL
ALP SERPL-CCNC: 48 U/L (ref 39–117)
ALT SERPL W P-5'-P-CCNC: 11 U/L (ref 1–33)
ANION GAP SERPL CALCULATED.3IONS-SCNC: 12.9 MMOL/L
AST SERPL-CCNC: 15 U/L (ref 1–32)
BASOPHILS # BLD AUTO: 0.07 10*3/MM3 (ref 0–0.2)
BASOPHILS NFR BLD AUTO: 0.7 % (ref 0–1.5)
BILIRUB SERPL-MCNC: 0.3 MG/DL (ref 0.1–1.2)
BUN BLD-MCNC: 23 MG/DL (ref 8–23)
BUN/CREAT SERPL: 13.1 (ref 7–25)
CALCIUM SPEC-SCNC: 9.9 MG/DL (ref 8.6–10.5)
CHLORIDE SERPL-SCNC: 98 MMOL/L (ref 98–107)
CO2 SERPL-SCNC: 28.1 MMOL/L (ref 22–29)
CREAT BLD-MCNC: 1.75 MG/DL (ref 0.57–1)
DEPRECATED RDW RBC AUTO: 55.6 FL (ref 37–54)
EOSINOPHIL # BLD AUTO: 0.48 10*3/MM3 (ref 0–0.7)
EOSINOPHIL NFR BLD AUTO: 4.8 % (ref 0.3–6.2)
ERYTHROCYTE [DISTWIDTH] IN BLOOD BY AUTOMATED COUNT: 15.4 % (ref 11.7–13)
GFR SERPL CREATININE-BSD FRML MDRD: 29 ML/MIN/1.73
GLOBULIN UR ELPH-MCNC: 3.2 GM/DL
GLUCOSE BLD-MCNC: 101 MG/DL (ref 65–99)
HCT VFR BLD AUTO: 36.7 % (ref 35.6–45.5)
HGB BLD-MCNC: 11.2 G/DL (ref 11.9–15.5)
IMM GRANULOCYTES # BLD: 0.09 10*3/MM3 (ref 0–0.03)
IMM GRANULOCYTES NFR BLD: 0.9 % (ref 0–0.5)
LYMPHOCYTES # BLD AUTO: 1.78 10*3/MM3 (ref 0.9–4.8)
LYMPHOCYTES NFR BLD AUTO: 17.9 % (ref 19.6–45.3)
MCH RBC QN AUTO: 30 PG (ref 26.9–32)
MCHC RBC AUTO-ENTMCNC: 30.5 G/DL (ref 32.4–36.3)
MCV RBC AUTO: 98.4 FL (ref 80.5–98.2)
MONOCYTES # BLD AUTO: 0.64 10*3/MM3 (ref 0.2–1.2)
MONOCYTES NFR BLD AUTO: 6.4 % (ref 5–12)
NEUTROPHILS # BLD AUTO: 6.9 10*3/MM3 (ref 1.9–8.1)
NEUTROPHILS NFR BLD AUTO: 69.3 % (ref 42.7–76)
PLATELET # BLD AUTO: 187 10*3/MM3 (ref 140–500)
PMV BLD AUTO: 11.8 FL (ref 6–12)
POTASSIUM BLD-SCNC: 4.3 MMOL/L (ref 3.5–5.2)
PROT SERPL-MCNC: 7.7 G/DL (ref 6–8.5)
RBC # BLD AUTO: 3.73 10*6/MM3 (ref 3.9–5.2)
SODIUM BLD-SCNC: 139 MMOL/L (ref 136–145)
TSH SERPL DL<=0.05 MIU/L-ACNC: 2.01 MIU/ML (ref 0.27–4.2)
WBC NRBC COR # BLD: 9.96 10*3/MM3 (ref 4.5–10.7)

## 2017-12-01 PROCEDURE — 99214 OFFICE O/P EST MOD 30 MIN: CPT | Performed by: INTERNAL MEDICINE

## 2017-12-01 PROCEDURE — G0008 ADMIN INFLUENZA VIRUS VAC: HCPCS | Performed by: INTERNAL MEDICINE

## 2017-12-01 PROCEDURE — 85025 COMPLETE CBC W/AUTO DIFF WBC: CPT | Performed by: INTERNAL MEDICINE

## 2017-12-01 PROCEDURE — 90662 IIV NO PRSV INCREASED AG IM: CPT | Performed by: INTERNAL MEDICINE

## 2017-12-01 PROCEDURE — 80053 COMPREHEN METABOLIC PANEL: CPT | Performed by: INTERNAL MEDICINE

## 2017-12-01 PROCEDURE — 84443 ASSAY THYROID STIM HORMONE: CPT | Performed by: INTERNAL MEDICINE

## 2017-12-01 NOTE — PROGRESS NOTES
Subjective   Alison Colvin is a 67 y.o. female.     History of Present Illness she is here today for annual visit which includes follow-up of hypertension, hypothyroidism, severe obstructive sleep apnea with chronic hypoxia, history of recurrent pulmonary emboli and deep vein thrombophlebitis on chronic anticoagulative therapy, history of gastric and duodenal ulcers with GI bleeding, chronic renal insufficiency, asthma, and migraine.  Despite all of these problems he actually has done very well this year.  She has intermittent migraine although at least recently day have not been as severe and frequent as in the past.  She denies any dizziness, syncope, or focal neurologic events.  She has occasional wheezing but no cough.  She has chronic stable dyspnea with relatively mild exertion.  She denies any PND or chest pain.  She has not had any swelling in the legs either.  She denies abdominal pain, melanotic stool, dysphagia, nausea and vomiting, and rectal bleeding.  She has chronic and stable 1-2 per night nocturia without dysuria.  She has not had any vaginal bleeding.  She uses oxygen continuously at 2 L/m.  At home she gets about primarily with use of a wheelchair.        Review of Systems   Constitutional: Negative for activity change, appetite change, fatigue and fever.   HENT: Negative for trouble swallowing.    Respiratory: Positive for shortness of breath and wheezing. Negative for cough and chest tightness.    Cardiovascular: Negative for chest pain, palpitations and leg swelling.   Gastrointestinal: Negative for abdominal pain, anal bleeding, blood in stool, constipation, diarrhea, nausea and vomiting.   Genitourinary: Negative for dysuria, flank pain, frequency and hematuria.   Musculoskeletal: Positive for gait problem.   Neurological: Positive for headaches. Negative for dizziness, syncope, facial asymmetry, speech difficulty, weakness and numbness.   Psychiatric/Behavioral: Negative for dysphoric  mood. The patient is not nervous/anxious.        Objective   Physical Exam   Constitutional: She is oriented to person, place, and time. Vital signs are normal. She appears well-developed and well-nourished. She is active. She does not appear ill.   Eyes: Conjunctivae are normal.   Fundoscopic exam:       The right eye shows no AV nicking, no exudate and no hemorrhage.        The left eye shows no AV nicking, no exudate and no hemorrhage.   Neck: Carotid bruit is not present. No thyroid mass and no thyromegaly present.   Cardiovascular: Normal rate, regular rhythm, S1 normal and S2 normal.  Exam reveals no S3 and no S4.    No murmur heard.  Pulses:       Posterior tibial pulses are 2+ on the right side, and 2+ on the left side.   Pulmonary/Chest: No tachypnea. No respiratory distress. She has no decreased breath sounds. She has no wheezes. She has no rhonchi. She has no rales.   Abdominal: Soft. Normal appearance. There is no tenderness.       Vascular Status -  Her exam exhibits no right foot edema. Her exam exhibits no left foot edema.  Neurological: She is alert and oriented to person, place, and time.   Psychiatric: She has a normal mood and affect. Her speech is normal and behavior is normal. Judgment and thought content normal. Cognition and memory are normal.       Assessment/Plan blood pressure by me today is 110/68    Assessment #1 hypertension-good control #2 recurrent pulmonary emboli-without recurrence on Coumadin #3 history of peptic ulcer disease with GI bleeding-fortunately thus far without recurrence on Coumadin #4 obstructive sleep apnea with hypoxia-doing fairly well  #5 hypothyroidism-euthyroid clinically #6 chronic venous insufficiency-controlled with diuretic therapy #7 chronic renal insufficiency-moderate but stable #8 asthma-without flare in some time    Plan #1 no change medication #2 flu vaccination today #3 CBC, CMP, TSH today.  Routine follow-up with me in 4 months.

## 2017-12-12 ENCOUNTER — TELEPHONE (OUTPATIENT)
Dept: INTERNAL MEDICINE | Facility: CLINIC | Age: 67
End: 2017-12-12

## 2017-12-12 NOTE — TELEPHONE ENCOUNTER
Per Dr. Del Real, Mrs. Colvin is to take 2.5 Mon, Wed, Fri and Sat.     5 MG on Tues, Thur and Sun.

## 2017-12-12 NOTE — TELEPHONE ENCOUNTER
----- Message from Ritika Ortiz MA sent at 12/12/2017  3:37 PM EST -----  [facility] calling out of range labs drawn today and reported by pt    INR 3.7

## 2017-12-14 NOTE — TELEPHONE ENCOUNTER
Off Coumadin for the 5 days prior to epidural.  Restart Coumadin the night of epidural if she is not going to have another epidural in 7 days.

## 2017-12-19 ENCOUNTER — TELEPHONE (OUTPATIENT)
Dept: INTERNAL MEDICINE | Facility: CLINIC | Age: 67
End: 2017-12-19

## 2017-12-19 NOTE — TELEPHONE ENCOUNTER
----- Message from Fany Vidal sent at 12/19/2017  3:14 PM EST -----  Contact: MD Maddison INR - Dr Herron's pt - RE: Out of Range INR  MD Maddison [facility] calling and would like to inform of an out of range INR      INR: 1.2      MD [facility]  # [phone]

## 2018-01-03 ENCOUNTER — TELEPHONE (OUTPATIENT)
Dept: INTERNAL MEDICINE | Facility: CLINIC | Age: 68
End: 2018-01-03

## 2018-01-03 NOTE — TELEPHONE ENCOUNTER
Per Dr. Del Real, Mrs. Colvin is to take 2.5 Mon, Wed, Fri and Sat.  5 MG on Tues, Thur and Sun.    This has been since 12/12/2017.      Two weeks prior to that, she has not been taking her coumadin due to having an epidural for two Thursday's in a row.

## 2018-01-03 NOTE — TELEPHONE ENCOUNTER
Discussed - she is holding coumadin b/c epidural injections.  She is to restart coumadin tomorrow (directed by dr figueroa).  Recheck INR next wk.

## 2018-01-03 NOTE — TELEPHONE ENCOUNTER
----- Message from Lin Kirkland sent at 1/3/2018 11:06 AM EST -----  Contact: Mary Henrandez with [facility] called with out-of-range INR.      INR 0.9 on 01/01/2018.  Please advise.     Patient:  [phone] (M)                [phone] (W)

## 2018-01-11 ENCOUNTER — TELEPHONE (OUTPATIENT)
Dept: INTERNAL MEDICINE | Facility: CLINIC | Age: 68
End: 2018-01-11

## 2018-01-11 NOTE — TELEPHONE ENCOUNTER
Mrs. Colvin's last epidural was Thursday the 4th.  She started back up on her Coumadin on Friday the 5th.      She is currently taking 5 MG Monday, Wednesday and Friday's and taking 2.5 MG on Tuesday, Thursday, Saturday and Sunday's.

## 2018-01-31 ENCOUNTER — TELEPHONE (OUTPATIENT)
Dept: INTERNAL MEDICINE | Facility: CLINIC | Age: 68
End: 2018-01-31

## 2018-01-31 NOTE — TELEPHONE ENCOUNTER
Pt advised to skip coumadin today and tomorrow, recheck PT, INR on Friday.  She has already taken today.  Would you like her to just skip tomorrow and check Friday?  Please advise.

## 2018-02-01 ENCOUNTER — TELEPHONE (OUTPATIENT)
Dept: INTERNAL MEDICINE | Facility: CLINIC | Age: 68
End: 2018-02-01

## 2018-02-01 NOTE — TELEPHONE ENCOUNTER
----- Message from Kourtney Lovelace sent at 2/1/2018  9:28 AM EST -----  Contact: nisa BARRAZA  Report for out of range test    Date: 21/31/17  INR- 3.4    Caller is unaware of pt's medication.

## 2018-02-06 ENCOUNTER — TELEPHONE (OUTPATIENT)
Dept: INTERNAL MEDICINE | Facility: CLINIC | Age: 68
End: 2018-02-06

## 2018-02-06 NOTE — TELEPHONE ENCOUNTER
Patient informed take 5mg tonight (Tues) tomorrow (weds) and Thurs, to check PT/INR Friday morning and call to inform us of numbers

## 2018-02-06 NOTE — TELEPHONE ENCOUNTER
----- Message from Fany Vidal sent at 2/6/2018  2:12 PM EST -----  Contact: TIGRE Sue - Dr Herron's pt - RE: out of Range INR  TIGRE Sue is calling and would like to inform of an out of range INR      INR: 1.4 today      TIGRE Sue  [phone]

## 2018-02-12 ENCOUNTER — TELEPHONE (OUTPATIENT)
Dept: INTERNAL MEDICINE | Facility: CLINIC | Age: 68
End: 2018-02-12

## 2018-02-12 NOTE — TELEPHONE ENCOUNTER
----- Message from Fany Vidal sent at 2/12/2018  3:14 PM EST -----  Contact: AARON Sue - Dr Herron's pt - RE: out of range INR  AARON Sue calling and would like to inform of an out of range INR      INR: 3.6 today    AARON Sue  # [phone]

## 2018-02-12 NOTE — TELEPHONE ENCOUNTER
No Coumadin tonight.  Then 5 mg by mouth daily with 7.5 mg on Sundays.  Repeat PT/INR in one week.

## 2018-02-13 NOTE — TELEPHONE ENCOUNTER
Pt informed.  She thought 7.5 mg sounded like a lot and wanted to double check with you.  Said she has never taken that much.

## 2018-02-28 ENCOUNTER — TELEPHONE (OUTPATIENT)
Dept: INTERNAL MEDICINE | Facility: CLINIC | Age: 68
End: 2018-02-28

## 2018-02-28 NOTE — TELEPHONE ENCOUNTER
----- Message from Enedina Tubbs sent at 2/28/2018  9:06 AM EST -----  Contact: [facility] patient  Dr. Herron patient    [facility] called with out of range INR drawn yesterday. INR 4.1    warfarin (COUMADIN) 5 MG tablet. Please advise    Patient:[phone] (M)  [phone] (W

## 2018-03-05 ENCOUNTER — TELEPHONE (OUTPATIENT)
Dept: INTERNAL MEDICINE | Facility: CLINIC | Age: 68
End: 2018-03-05

## 2018-03-05 NOTE — TELEPHONE ENCOUNTER
Change Coumadin to 5 mg on Tuesdays Thursdays Saturdays and Sundays with 2.5 mg on Mondays Wednesdays and Fridays.  Repeat PT/INR 1 week.

## 2018-03-05 NOTE — TELEPHONE ENCOUNTER
----- Message from Kourtney Lovelace sent at 3/5/2018  9:25 AM EST -----  Contact: Pt  Pt was asked to do INR and call      INR   1.7    warfarin   5 MG MON WED FRI  2.5MG  ALL OTHER DAYS      Pt#Phone:  M:[phone]; W:[phone]

## 2018-03-13 ENCOUNTER — TELEPHONE (OUTPATIENT)
Dept: INTERNAL MEDICINE | Facility: CLINIC | Age: 68
End: 2018-03-13

## 2018-03-13 NOTE — TELEPHONE ENCOUNTER
----- Message from Fany Vidal sent at 3/13/2018  8:14 AM EDT -----  Contact: pt - Dr Herron's pt - RE: INR results  Pt calling and would like to inform of pt's INR      INR:  2.4    Pt is taking 5 mg Tues, Thurs Sat & Sun and 2.5 all other days. Could you please call pt to discuss? Casie cartwright advise. Thanks      Pot # [phone]

## 2018-03-20 ENCOUNTER — TELEPHONE (OUTPATIENT)
Dept: INTERNAL MEDICINE | Facility: CLINIC | Age: 68
End: 2018-03-20

## 2018-03-20 NOTE — TELEPHONE ENCOUNTER
----- Message from Kourtney Lovelace sent at 3/20/2018 11:34 AM EDT -----  Contact: [facility]   FYI  Report for out of range test    Date: 3/20  INR- 4.4      Caller is unaware of pt's medication.    Caller #[phone]

## 2018-03-20 NOTE — TELEPHONE ENCOUNTER
----- Message from Kourtney Lovelace sent at 3/20/2018  8:17 AM EDT -----  Contact: pt  Pt was asked to take her INR for MD and to call in with it, her INR is 4.4.    Pt is taking 5 mg Tues, Thurs Sat & Sun and 2.5 all other days      Please advise.  Pt# [phone]

## 2018-03-21 ENCOUNTER — TELEPHONE (OUTPATIENT)
Dept: INTERNAL MEDICINE | Facility: CLINIC | Age: 68
End: 2018-03-21

## 2018-03-21 NOTE — TELEPHONE ENCOUNTER
No Coumadin today.  Then begin Coumadin 7.5 mg Monday Wednesday and Friday with 5 mg on Tuesdays Thursdays Saturdays and Sundays.  Repeat PT/INR in one week.

## 2018-03-21 NOTE — TELEPHONE ENCOUNTER
----- Message from Dianna Dueñas sent at 3/21/2018 12:03 PM EDT -----  Pt did her home monitoring INR 3.3    Dosage pt took nothing yesterday    Pt# [phone]

## 2018-03-27 ENCOUNTER — TELEPHONE (OUTPATIENT)
Dept: INTERNAL MEDICINE | Facility: CLINIC | Age: 68
End: 2018-03-27

## 2018-03-27 NOTE — TELEPHONE ENCOUNTER
----- Message from Lin Kirkland sent at 3/27/2018 12:05 PM EDT -----  Contact: Patient   INR 1.4 today.  She takes  2.5 mg warfarin on Tuesday, Thursday, and Sunday; 5 mg on Monday, Wednesday and Friday.  Please advise.  States she has plenty of medicine.     Patient:  [phone]

## 2018-03-27 NOTE — TELEPHONE ENCOUNTER
Change Coumadin to 5 mg on Tuesdays Thursdays Saturdays and Sundays with 2.5 mg on Mondays Wednesdays and Fridays.  Repeat PT/INR in 2 weeks.

## 2018-04-05 RX ORDER — DULOXETIN HYDROCHLORIDE 60 MG/1
60 CAPSULE, DELAYED RELEASE ORAL DAILY
Qty: 90 CAPSULE | Refills: 1 | Status: SHIPPED | OUTPATIENT
Start: 2018-04-05 | End: 2018-08-08 | Stop reason: SDUPTHER

## 2018-04-05 RX ORDER — TIZANIDINE 4 MG/1
TABLET ORAL
Qty: 180 TABLET | Refills: 1 | Status: SHIPPED | OUTPATIENT
Start: 2018-04-05 | End: 2018-09-18 | Stop reason: SDUPTHER

## 2018-04-05 RX ORDER — LEVOTHYROXINE SODIUM 0.05 MG/1
50 TABLET ORAL DAILY
Qty: 90 TABLET | Refills: 1 | Status: SHIPPED | OUTPATIENT
Start: 2018-04-05 | End: 2018-08-08 | Stop reason: SDUPTHER

## 2018-04-09 ENCOUNTER — OFFICE VISIT (OUTPATIENT)
Dept: INTERNAL MEDICINE | Facility: CLINIC | Age: 68
End: 2018-04-09

## 2018-04-09 VITALS
HEIGHT: 62 IN | SYSTOLIC BLOOD PRESSURE: 100 MMHG | HEART RATE: 64 BPM | DIASTOLIC BLOOD PRESSURE: 60 MMHG | OXYGEN SATURATION: 99 %

## 2018-04-09 DIAGNOSIS — K92.2 UPPER GASTROINTESTINAL HEMORRHAGE: ICD-10-CM

## 2018-04-09 DIAGNOSIS — J45.40 MODERATE PERSISTENT ASTHMA WITHOUT COMPLICATION: Primary | ICD-10-CM

## 2018-04-09 DIAGNOSIS — N18.30 CHRONIC RENAL IMPAIRMENT, STAGE 3 (MODERATE) (HCC): ICD-10-CM

## 2018-04-09 DIAGNOSIS — I87.2 CHRONIC VENOUS INSUFFICIENCY: ICD-10-CM

## 2018-04-09 DIAGNOSIS — Z86.711 HISTORY OF PULMONARY EMBOLISM: ICD-10-CM

## 2018-04-09 PROCEDURE — 99213 OFFICE O/P EST LOW 20 MIN: CPT | Performed by: INTERNAL MEDICINE

## 2018-04-09 NOTE — PROGRESS NOTES
Subjective   Alison Colvin is a 67 y.o. female.     History of Present Illness she is here today for follow-up of asthma, chronic renal failure, history of pulmonary embolism and deep vein thrombophlebitis, and history of GI bleeding.  She has done very well since her last visit.  She has been on diet in an attempt to lose weight.  She feels well.  She has not had any PND or dyspnea on exertion.  She denies any chest pain or wheezing.  She has not had any significant cough.  She denies any dizziness, syncopal, or focal neurologic episodes.  She has not had any abdominal pain, melanotic stool, or rectal bleeding.        Review of Systems   Constitutional: Negative for activity change, appetite change and fatigue.   HENT: Negative for trouble swallowing.    Respiratory: Negative for cough, chest tightness, shortness of breath and wheezing.    Cardiovascular: Negative for chest pain and leg swelling.   Gastrointestinal: Negative for abdominal pain, anal bleeding, blood in stool, nausea and vomiting.   Genitourinary: Negative for flank pain and hematuria.   Neurological: Negative for dizziness, facial asymmetry, speech difficulty, weakness, numbness and headaches.   Psychiatric/Behavioral: Negative for depressed mood. The patient is not nervous/anxious.        Objective   Physical Exam   Constitutional: She is oriented to person, place, and time. She appears well-developed and well-nourished. She is active. She does not appear ill.   Eyes: Conjunctivae are normal.   Cardiovascular: Normal rate, regular rhythm, S1 normal and S2 normal.  Exam reveals no S3 and no S4.    No murmur heard.  Pulmonary/Chest: No tachypnea. No respiratory distress. She has no decreased breath sounds. She has no wheezes. She has no rhonchi. She has no rales.   Abdominal: Soft. Normal appearance and bowel sounds are normal. She exhibits no abdominal bruit and no mass. There is no hepatosplenomegaly. There is no tenderness.     Vascular Status -   Her right foot exhibits no edema. Her left foot exhibits no edema.  Neurological: She is alert and oriented to person, place, and time.   Psychiatric: She has a normal mood and affect. Her behavior is normal. Judgment and thought content normal. Cognition and memory are normal.         Assessment/Plan assessment #1 asthma-compensated #2 history of pulmonary embolism and deep vein thrombophlebitis-she is tolerating anticoagulant therapy #3 history of GI bleeding-fortunately without recurrence on Coumadin # 4 chronic renal insufficiency-moderate and stable    Recent lab showed BUN 23 creatinine 1.75.  Hematocrit 36.7 hemoglobin 11.2 MCV 98.4    Plan #1 no change medication.  Routine follow-up with me in 3 months.

## 2018-04-10 ENCOUNTER — TELEPHONE (OUTPATIENT)
Dept: INTERNAL MEDICINE | Facility: CLINIC | Age: 68
End: 2018-04-10

## 2018-04-10 NOTE — TELEPHONE ENCOUNTER
----- Message from Fany Vidal sent at 4/10/2018 11:44 AM EDT -----  Contact: MD Marisol INR - Dr Herron's pt - RE: out of range INR  Robyn Reddy MD INR calling and would like to inform of an out of range INR      INR: 3.4      [phone]

## 2018-04-10 NOTE — TELEPHONE ENCOUNTER
PT INR is appropriate.  She has a history of pulmonary emboli.  Therefore the range is 2.5-3.5 for therapeutic efficacy.

## 2018-04-24 ENCOUNTER — TELEPHONE (OUTPATIENT)
Dept: INTERNAL MEDICINE | Facility: CLINIC | Age: 68
End: 2018-04-24

## 2018-04-25 ENCOUNTER — TELEPHONE (OUTPATIENT)
Dept: INTERNAL MEDICINE | Facility: CLINIC | Age: 68
End: 2018-04-25

## 2018-04-25 NOTE — TELEPHONE ENCOUNTER
----- Message from Enedina Tubbs sent at 4/25/2018  9:09 AM EDT -----  Contact: Patient  Patient calling to report    INR-3.3      warfarin (COUMADIN) 5 MG tablet did not take yesterday or today.     Patient:[phone]

## 2018-05-09 ENCOUNTER — TELEPHONE (OUTPATIENT)
Dept: INTERNAL MEDICINE | Facility: CLINIC | Age: 68
End: 2018-05-09

## 2018-05-09 NOTE — TELEPHONE ENCOUNTER
Appropriate PT/INR is 2.5-3.5 due to her recurrent pulmonary embolism.  No change in Coumadin dose.  Repeat PT/INR Friday morning.

## 2018-05-09 NOTE — TELEPHONE ENCOUNTER
----- Message from Enedina Tubbs sent at 5/9/2018  9:43 AM EDT -----  Contact: MD INR  MD INR calling to report.  INR-3.6    warfarin (COUMADIN) 5 MG tablet

## 2018-05-14 ENCOUNTER — TELEPHONE (OUTPATIENT)
Dept: INTERNAL MEDICINE | Facility: CLINIC | Age: 68
End: 2018-05-14

## 2018-05-14 NOTE — TELEPHONE ENCOUNTER
----- Message from Enedina Tubbs sent at 5/14/2018  9:34 AM EDT -----  Contact: MD INR  MD INR reporting    INR-2.0 on 5/11 Friday evening    Does not handle the     warfarin (COUMADIN) 5 MG tablet

## 2018-05-22 ENCOUNTER — TELEPHONE (OUTPATIENT)
Dept: INTERNAL MEDICINE | Facility: CLINIC | Age: 68
End: 2018-05-22

## 2018-05-22 NOTE — TELEPHONE ENCOUNTER
Patient informed. She stated she is taking 2.5 mg on Monday and Friday and 5 mg on Tue,Wed,Thurs,Sat, and Sunday.

## 2018-05-22 NOTE — TELEPHONE ENCOUNTER
Message has been forwarded to Dr Herron with dosage of current usage of coumadin per Renae speaking with patient

## 2018-05-22 NOTE — TELEPHONE ENCOUNTER
----- Message from Enedina Tubbs sent at 5/22/2018  2:51 PM EDT -----  Contact: CAL[facility] PHELPS INR calling to report     INR-4.0    warfarin (COUMADIN) 5 MG tablet

## 2018-05-22 NOTE — TELEPHONE ENCOUNTER
----- Message from Dillon Herron Jr., MD sent at 5/22/2018 10:21 AM EDT -----  No Coumadin today.  PT INR in a.m.  What is present dose of Coumadin?

## 2018-05-23 ENCOUNTER — TELEPHONE (OUTPATIENT)
Dept: INTERNAL MEDICINE | Facility: CLINIC | Age: 68
End: 2018-05-23

## 2018-05-23 NOTE — TELEPHONE ENCOUNTER
Resume Coumadin with 5 mg Tuesday Thursday Saturday and Sunday and 2.5 mg Monday Wednesday Friday.  Repeat PT/INR 1 week.

## 2018-05-23 NOTE — TELEPHONE ENCOUNTER
----- Message from Fany Vidla sent at 5/23/2018  8:07 AM EDT -----  Contact: pt - Dr Herron's pt - RE: INR  Pt calling and would like a return call regarding pt's INR      INR: 3.4      Pt did not take Coumadin yesterday.    Pt # [phone]

## 2018-06-21 ENCOUNTER — TELEPHONE (OUTPATIENT)
Dept: INTERNAL MEDICINE | Facility: CLINIC | Age: 68
End: 2018-06-21

## 2018-06-21 RX ORDER — METOCLOPRAMIDE 10 MG/1
TABLET ORAL
Qty: 360 TABLET | Refills: 1 | Status: SHIPPED | OUTPATIENT
Start: 2018-06-21 | End: 2018-11-29 | Stop reason: SDUPTHER

## 2018-06-21 RX ORDER — POTASSIUM CHLORIDE 20 MEQ/1
TABLET, EXTENDED RELEASE ORAL
Qty: 180 TABLET | Refills: 1 | Status: SHIPPED | OUTPATIENT
Start: 2018-06-21 | End: 2018-11-29 | Stop reason: SDUPTHER

## 2018-06-21 RX ORDER — BACLOFEN 10 MG/1
TABLET ORAL
Qty: 180 TABLET | Refills: 1 | Status: SHIPPED | OUTPATIENT
Start: 2018-06-21 | End: 2018-11-29 | Stop reason: SDUPTHER

## 2018-06-21 RX ORDER — PREDNISONE 1 MG/1
5 TABLET ORAL DAILY
Qty: 90 TABLET | Refills: 1 | Status: SHIPPED | OUTPATIENT
Start: 2018-06-21 | End: 2018-11-29 | Stop reason: SDUPTHER

## 2018-06-21 NOTE — TELEPHONE ENCOUNTER
----- Message from Fany Vidal sent at 6/21/2018  2:12 PM EDT -----  Contact: pt - Dr Herron's pt - RE: INR  Pt calling and would like to inform of pt's INR      INR 3.3 today      Pt # [phone]

## 2018-07-17 ENCOUNTER — OFFICE VISIT (OUTPATIENT)
Dept: INTERNAL MEDICINE | Facility: CLINIC | Age: 68
End: 2018-07-17

## 2018-07-17 VITALS
HEIGHT: 62 IN | WEIGHT: 195 LBS | DIASTOLIC BLOOD PRESSURE: 68 MMHG | SYSTOLIC BLOOD PRESSURE: 102 MMHG | BODY MASS INDEX: 35.88 KG/M2

## 2018-07-17 DIAGNOSIS — E03.9 ACQUIRED HYPOTHYROIDISM: ICD-10-CM

## 2018-07-17 DIAGNOSIS — N18.30 CHRONIC RENAL IMPAIRMENT, STAGE 3 (MODERATE) (HCC): ICD-10-CM

## 2018-07-17 DIAGNOSIS — K92.2 UPPER GASTROINTESTINAL HEMORRHAGE: ICD-10-CM

## 2018-07-17 DIAGNOSIS — Z86.711 HISTORY OF PULMONARY EMBOLISM: ICD-10-CM

## 2018-07-17 DIAGNOSIS — I10 BENIGN ESSENTIAL HYPERTENSION: ICD-10-CM

## 2018-07-17 DIAGNOSIS — G47.33 OBSTRUCTIVE SLEEP APNEA SYNDROME: Primary | ICD-10-CM

## 2018-07-17 DIAGNOSIS — J98.4 RESTRICTIVE LUNG DISEASE: ICD-10-CM

## 2018-07-17 DIAGNOSIS — I48.0 PAF (PAROXYSMAL ATRIAL FIBRILLATION) (HCC): ICD-10-CM

## 2018-07-17 DIAGNOSIS — G47.34 CHRONIC INTERMITTENT HYPOXIA WITH OBSTRUCTIVE SLEEP APNEA: ICD-10-CM

## 2018-07-17 DIAGNOSIS — J45.40 MODERATE PERSISTENT ASTHMA WITHOUT COMPLICATION: ICD-10-CM

## 2018-07-17 DIAGNOSIS — I87.2 CHRONIC VENOUS INSUFFICIENCY: ICD-10-CM

## 2018-07-17 DIAGNOSIS — G47.33 CHRONIC INTERMITTENT HYPOXIA WITH OBSTRUCTIVE SLEEP APNEA: ICD-10-CM

## 2018-07-17 DIAGNOSIS — K26.9 DUODENAL ULCER: ICD-10-CM

## 2018-07-17 DIAGNOSIS — K25.4 CHRONIC GASTRIC ULCER WITH HEMORRHAGE: ICD-10-CM

## 2018-07-17 PROCEDURE — 99213 OFFICE O/P EST LOW 20 MIN: CPT | Performed by: INTERNAL MEDICINE

## 2018-07-17 NOTE — PROGRESS NOTES
Subjective   Alison Colvin is a 67 y.o. female.     History of Present Illness she is here today for follow-up of hypertension, obstructive sleep apnea, nocturnal hypoxia, asthma, history of deep vein thrombophlebitis and pulmonary embolism with vena cava filter, history of GI bleeding and peptic ulcer disease, and chronic renal failure.  All that being said she has done very well since her last visit.  Over the last year she has lost 55 pounds with diet.  She feels much better as one would expect.  She still uses oxygen continuously however.  She denies any wheezing or persistent cough and she denies any PND or dyspnea when walking with a walker.  She has not had any chest pain.  Her CPAP machine is old and so is her mask.  She needs to be retested.  She denies any abdominal pain, melanotic stool, rectal bleeding.  She has not had any dizziness, syncopal, or focal neurologic symptoms.        Review of Systems   Constitutional: Negative for activity change, appetite change and fatigue.   Respiratory: Negative for cough, chest tightness, shortness of breath and wheezing.    Cardiovascular: Negative for chest pain, palpitations and leg swelling.   Gastrointestinal: Negative for abdominal pain, blood in stool, nausea and vomiting.   Genitourinary: Negative for flank pain.   Neurological: Negative for dizziness, syncope, weakness and headache.   Psychiatric/Behavioral: Negative for depressed mood. The patient is not nervous/anxious.        Objective   Physical Exam   Constitutional: She is oriented to person, place, and time. She appears well-developed and well-nourished. She is active. She does not appear ill.   Eyes: Conjunctivae are normal.   Cardiovascular: Normal rate, regular rhythm, S1 normal and S2 normal.  Exam reveals no S3 and no S4.    No murmur heard.  Pulmonary/Chest: No tachypnea. No respiratory distress. She has no decreased breath sounds. She has no wheezes. She has no rhonchi. She has no rales.    Abdominal: Soft. Normal appearance. There is no tenderness.     Vascular Status -  Her right foot exhibits no edema. Her left foot exhibits no edema.  Neurological: She is alert and oriented to person, place, and time.   Psychiatric: She has a normal mood and affect. Her speech is normal and behavior is normal. Judgment and thought content normal. Cognition and memory are normal.         Assessment/Plan O2 sat 95% at rest    Assessment #1 nocturnal hypoxia-with weight loss may no longer be a problem #2 obstructive apnea-likewise #3 history of peptic ulcer disease and GI bleeding-fortunately without recurrence over the last several years #4 recurrent pulmonary emboli and deep vein thrombophlebitis-doing well on chronic anticoagulant therapy #5 asthma-controlled.  #6 chronic anemia-moderate and stable #7 chronic renal failure-moderate and stable Overall she is doing very well with significant weight loss.    Plan #1 no change in medication #2 pulmonary consult for follow-up of sleep apnea with testing #3 overnight O2 sat study.  Routine follow-up in 4 months.

## 2018-08-01 ENCOUNTER — TELEPHONE (OUTPATIENT)
Dept: INTERNAL MEDICINE | Facility: CLINIC | Age: 68
End: 2018-08-01

## 2018-08-01 NOTE — TELEPHONE ENCOUNTER
----- Message from Kourtney Lovelace sent at 8/1/2018  1:23 PM EDT -----  Contact: Carl with med INR  Report for out of range test  Date: 7/31  INR- 2.1    Caller is unaware of pt's medication.    Caller #[phone]

## 2018-08-07 ENCOUNTER — TELEPHONE (OUTPATIENT)
Dept: INTERNAL MEDICINE | Facility: CLINIC | Age: 68
End: 2018-08-07

## 2018-08-07 NOTE — TELEPHONE ENCOUNTER
----- Message from Fany Vidal sent at 8/7/2018  2:37 PM EDT -----  Contact: MD Doc INR - Dr Herron's pt - RE: out of range INR  MD Lacy INR calling and would like to report an out of range INR      INR: 2.4      MD Doc INR  # [phone]

## 2018-08-08 RX ORDER — DULOXETIN HYDROCHLORIDE 60 MG/1
60 CAPSULE, DELAYED RELEASE ORAL DAILY
Qty: 90 CAPSULE | Refills: 3 | Status: SHIPPED | OUTPATIENT
Start: 2018-08-08 | End: 2018-11-29 | Stop reason: SDUPTHER

## 2018-08-08 RX ORDER — LEVOTHYROXINE SODIUM 0.05 MG/1
50 TABLET ORAL DAILY
Qty: 90 TABLET | Refills: 3 | Status: SHIPPED | OUTPATIENT
Start: 2018-08-08 | End: 2018-11-29 | Stop reason: SDUPTHER

## 2018-08-14 ENCOUNTER — TELEPHONE (OUTPATIENT)
Dept: INTERNAL MEDICINE | Facility: CLINIC | Age: 68
End: 2018-08-14

## 2018-08-14 NOTE — TELEPHONE ENCOUNTER
----- Message from Fany Vidal sent at 8/14/2018  3:04 PM EDT -----  Contact: MD Quin INR - Dr Herron's pt - RE: Out of Range INR  MD Quin INR, calling and would like to report an out of Range INR      INR: 1.1      MD Quin INR   # [phone]

## 2018-08-15 NOTE — TELEPHONE ENCOUNTER
I spoke with Dr Herron and he stated that the patient is off coumadin for 5 days due to getting a epidural and that's why her INR is so low. I called and informed MD INR.

## 2018-08-22 ENCOUNTER — TELEPHONE (OUTPATIENT)
Dept: INTERNAL MEDICINE | Facility: CLINIC | Age: 68
End: 2018-08-22

## 2018-08-22 NOTE — TELEPHONE ENCOUNTER
----- Message from Fany Vidal sent at 8/22/2018 10:32 AM EDT -----  Contact: Vikram INR - Dr Herron's pt - RE: out of Range INR  AARON uSe calling and would like to report an out of Range INR      INR: 0.9    Could you please call if any questions or concerns? Please advise. Thanks    Vikram INR  # [phone]

## 2018-08-23 ENCOUNTER — OFFICE VISIT (OUTPATIENT)
Dept: INTERNAL MEDICINE | Facility: CLINIC | Age: 68
End: 2018-08-23

## 2018-08-23 VITALS
OXYGEN SATURATION: 97 % | SYSTOLIC BLOOD PRESSURE: 112 MMHG | DIASTOLIC BLOOD PRESSURE: 70 MMHG | HEIGHT: 62 IN | HEART RATE: 64 BPM

## 2018-08-23 DIAGNOSIS — G47.33 OBSTRUCTIVE SLEEP APNEA SYNDROME: ICD-10-CM

## 2018-08-23 DIAGNOSIS — J45.40 MODERATE PERSISTENT ASTHMA WITHOUT COMPLICATION: Primary | ICD-10-CM

## 2018-08-23 DIAGNOSIS — J98.4 RESTRICTIVE LUNG DISEASE: ICD-10-CM

## 2018-08-23 DIAGNOSIS — G47.34 NOCTURNAL HYPOXIA: ICD-10-CM

## 2018-08-23 PROCEDURE — 99213 OFFICE O/P EST LOW 20 MIN: CPT | Performed by: INTERNAL MEDICINE

## 2018-08-23 NOTE — PROGRESS NOTES
Subjective   Alison Colvin is a 67 y.o. female.     History of Present Illness she is here today for follow-up of obstructive sleep apnea, restrictive lung disease, asthma, and nocturnal hypoxia.  She continues to use oxygen at 2 L/m as well as her CPAP.  Fortunately she has done well from a respiratory standpoint recently.  She denies any PND, ALATORRE, chest pain, cough, or wheezing.  She no longer has swelling in the legs due to her substantial weight loss on diet over the last year.  Presently she is undergoing epidural blocks 4 lumbar spine stenosis.  She is off Coumadin of course until her last epidural blocks next week.        Review of Systems   Constitutional: Negative for activity change, appetite change and fatigue.   Respiratory: Negative for cough, chest tightness, shortness of breath and wheezing.    Cardiovascular: Negative for chest pain, palpitations and leg swelling.   Gastrointestinal: Negative for abdominal pain.   Genitourinary: Negative for flank pain.   Musculoskeletal: Positive for back pain.   Neurological: Negative for dizziness, syncope, weakness and headache.   Psychiatric/Behavioral: Negative for depressed mood. The patient is not nervous/anxious.        Objective   Physical Exam   Constitutional: She is oriented to person, place, and time. Vital signs are normal. She appears well-developed and well-nourished. She is active. She does not appear ill.   Eyes: Conjunctivae are normal.   Cardiovascular: Normal rate, regular rhythm, S1 normal and S2 normal.  Exam reveals no S3 and no S4.    No murmur heard.  Pulmonary/Chest: No tachypnea. No respiratory distress. She has no decreased breath sounds. She has no wheezes. She has no rhonchi. She has no rales.     Vascular Status -  Her right foot exhibits no edema. Her left foot exhibits no edema.  Neurological: She is alert and oriented to person, place, and time.   Psychiatric: She has a normal mood and affect. Her speech is normal and behavior is  normal. Judgment and thought content normal. Cognition and memory are normal.         Assessment/Plan overnight O2 sats study showed less than 90% 10.7% of the time.  Her lowest O2 sat was 79%    Assessment #1 obstructive sleep apnea-compliant and benefiting #2 nocturnal hypoxia-obviously she needs to continue supplemental oxygen #3 asthma-compensated    Plan #1 no change medication #2 continue oxygen at 2 L/m nocturnally or during the daytime when sleeping #3 resume Coumadin the night of her last epidural block with follow-up PT/INR in one week thereafter.  Routine follow-up in 3 months.

## 2018-08-29 ENCOUNTER — TELEPHONE (OUTPATIENT)
Dept: INTERNAL MEDICINE | Facility: CLINIC | Age: 68
End: 2018-08-29

## 2018-08-29 NOTE — TELEPHONE ENCOUNTER
----- Message from Fany Vidal sent at 8/29/2018 10:05 AM EDT -----  Contact: MD Maddison INR - Dr Herron's pt - RE: INR results  MD Maddison INR calling and would like to report pt's INR        INR: 0.9      MD Maddison INR  # [phone]

## 2018-09-04 ENCOUNTER — TELEPHONE (OUTPATIENT)
Dept: INTERNAL MEDICINE | Facility: CLINIC | Age: 68
End: 2018-09-04

## 2018-09-04 NOTE — TELEPHONE ENCOUNTER
----- Message from Enedina Tubbs sent at 9/4/2018 12:15 PM EDT -----  Contact: MD INR  MD INR calling to report.    INR-1.5    Does not know how patient takes medication

## 2018-09-06 NOTE — TELEPHONE ENCOUNTER
Pt is taking 5 mg on 4 days ( tus,thu,sat,sun) and 2.5 mg on the other days. Pt back on warfarin on 08/29 after her epidural. Please advise

## 2018-09-06 NOTE — TELEPHONE ENCOUNTER
Take an extra 2.5 mg today and tomorrow, take 5 mg in place of 2.5 mg on this Sat and Sun only, then go to her usual regimen. Recheck INR in a week-10 days

## 2018-09-18 ENCOUNTER — TELEPHONE (OUTPATIENT)
Dept: INTERNAL MEDICINE | Facility: CLINIC | Age: 68
End: 2018-09-18

## 2018-09-18 RX ORDER — TIZANIDINE 4 MG/1
4 TABLET ORAL 2 TIMES DAILY
Qty: 180 TABLET | Refills: 1 | Status: SHIPPED | OUTPATIENT
Start: 2018-09-18 | End: 2018-11-29 | Stop reason: SDUPTHER

## 2018-09-26 ENCOUNTER — TELEPHONE (OUTPATIENT)
Dept: INTERNAL MEDICINE | Facility: CLINIC | Age: 68
End: 2018-09-26

## 2018-09-26 NOTE — TELEPHONE ENCOUNTER
Patient has been informed to start taking her coumadin   2.5 mg Tues Thurs Sat Sun  5 mg Mon Weds Fri

## 2018-10-02 ENCOUNTER — TELEPHONE (OUTPATIENT)
Dept: INTERNAL MEDICINE | Facility: CLINIC | Age: 68
End: 2018-10-02

## 2018-10-02 NOTE — TELEPHONE ENCOUNTER
----- Message from Lin Kirkland sent at 10/2/2018  2:55 PM EDT -----  Contact: Natacha Manzano with MDINR call with INR reading; drawn today by patient.     INR:  2.3    Warfarin (COUMADIN) 5 MG tablet    Patient:  [phone] (M)                [phone] (W)

## 2018-10-03 ENCOUNTER — TELEPHONE (OUTPATIENT)
Dept: INTERNAL MEDICINE | Facility: CLINIC | Age: 68
End: 2018-10-03

## 2018-10-03 NOTE — TELEPHONE ENCOUNTER
Dr Hui has stated PT/INR was is good same dose and check in 2 weeks but patient has to check weekly for program. Continued dose is 2.5 Tues Thurs Sat Sun and 5 mg Mon Weds and Fri

## 2018-10-11 ENCOUNTER — TELEPHONE (OUTPATIENT)
Dept: INTERNAL MEDICINE | Facility: CLINIC | Age: 68
End: 2018-10-11

## 2018-10-11 NOTE — TELEPHONE ENCOUNTER
Fax received regarding PT /INR on self testing patient    Per Dianna addressed to continue current coumadin dose @ 2.5 mg , recheck 1 week. Notified pt

## 2018-10-25 ENCOUNTER — TELEPHONE (OUTPATIENT)
Dept: INTERNAL MEDICINE | Facility: CLINIC | Age: 68
End: 2018-10-25

## 2018-10-25 NOTE — TELEPHONE ENCOUNTER
Called and informed patient per Melida for patient to take 5 mg of coumadin on Sundays ant thursdays and 2.5 mg on Mon,Tue,Wed,Fri and Sat. And to recheck INR in 2 weeks. Patient understood and stated she would call back.

## 2018-11-07 ENCOUNTER — TELEPHONE (OUTPATIENT)
Dept: INTERNAL MEDICINE | Facility: CLINIC | Age: 68
End: 2018-11-07

## 2018-11-07 NOTE — TELEPHONE ENCOUNTER
----- Message from Enedina Tubbs sent at 11/7/2018  9:50 AM EST -----  Contact: MD INR  MD INR calling in    INR-2.0    [id number]-[phone]    Do not handle dosing. Please advise

## 2018-11-07 NOTE — TELEPHONE ENCOUNTER
5 mg of coumadin on Sundays ant thursdays and 2.5 mg on Mon,Tue,Wed,Fri and Sat. And to recheck INR in 2 weeks

## 2018-11-21 ENCOUNTER — TELEPHONE (OUTPATIENT)
Dept: INTERNAL MEDICINE | Facility: CLINIC | Age: 68
End: 2018-11-21

## 2018-11-21 NOTE — TELEPHONE ENCOUNTER
Called patient she is taking 5 mg of coumadin on Thursday and Sundays and 2.5 mg the rest of the week. Please advise. Thank you.

## 2018-11-21 NOTE — TELEPHONE ENCOUNTER
----- Message from Kourtney Lovelace sent at 11/21/2018  9:35 AM EST -----  Contact: Izzy with [facility]  Report for out of range test  Date: 11/20  INR- 1.9    Caller is unaware of pt's medication.    Caller #[phone]

## 2018-11-21 NOTE — TELEPHONE ENCOUNTER
If she is worried about it making her INR go up too high, it would be okay to continue her current dose.  Check INR in another week.

## 2018-11-21 NOTE — TELEPHONE ENCOUNTER
I recommend she take 5 mg 6 days a week, 2-1/2 mg one daily week.    Has she established with a new primary care physician?

## 2018-11-21 NOTE — TELEPHONE ENCOUNTER
Dr. Hui, I gave pt instructions on Coumadin and she said that it is going to make INR go high.  I told her I would let you know, but that new instructions would probably not change since your recommendation is based on current dose and the INR 1.9.

## 2018-11-29 ENCOUNTER — OFFICE VISIT (OUTPATIENT)
Dept: FAMILY MEDICINE CLINIC | Facility: CLINIC | Age: 68
End: 2018-11-29

## 2018-11-29 VITALS
OXYGEN SATURATION: 99 % | DIASTOLIC BLOOD PRESSURE: 72 MMHG | BODY MASS INDEX: 35.66 KG/M2 | TEMPERATURE: 98.4 F | HEART RATE: 90 BPM | HEIGHT: 62 IN | SYSTOLIC BLOOD PRESSURE: 112 MMHG

## 2018-11-29 DIAGNOSIS — E03.9 ACQUIRED HYPOTHYROIDISM: ICD-10-CM

## 2018-11-29 DIAGNOSIS — I48.0 PAF (PAROXYSMAL ATRIAL FIBRILLATION) (HCC): ICD-10-CM

## 2018-11-29 DIAGNOSIS — G62.89 OTHER POLYNEUROPATHY: ICD-10-CM

## 2018-11-29 DIAGNOSIS — K21.9 GASTROESOPHAGEAL REFLUX DISEASE, ESOPHAGITIS PRESENCE NOT SPECIFIED: ICD-10-CM

## 2018-11-29 DIAGNOSIS — I10 BENIGN ESSENTIAL HYPERTENSION: ICD-10-CM

## 2018-11-29 DIAGNOSIS — Z86.711 HISTORY OF PULMONARY EMBOLISM: ICD-10-CM

## 2018-11-29 DIAGNOSIS — N18.30 CHRONIC RENAL IMPAIRMENT, STAGE 3 (MODERATE) (HCC): ICD-10-CM

## 2018-11-29 DIAGNOSIS — G47.33 CHRONIC INTERMITTENT HYPOXIA WITH OBSTRUCTIVE SLEEP APNEA: ICD-10-CM

## 2018-11-29 DIAGNOSIS — Z86.72 HISTORY OF DEEP VEIN THROMBOPHLEBITIS OF LOWER EXTREMITY: ICD-10-CM

## 2018-11-29 DIAGNOSIS — G43.019 INTRACTABLE MIGRAINE WITHOUT AURA AND WITHOUT STATUS MIGRAINOSUS: Primary | ICD-10-CM

## 2018-11-29 DIAGNOSIS — M1A.9XX1 GOUT WITH TOPHUS: ICD-10-CM

## 2018-11-29 DIAGNOSIS — G47.34 CHRONIC INTERMITTENT HYPOXIA WITH OBSTRUCTIVE SLEEP APNEA: ICD-10-CM

## 2018-11-29 DIAGNOSIS — Z95.828 HISTORY OF INFERIOR VENA CAVAL FILTER PLACEMENT: ICD-10-CM

## 2018-11-29 PROCEDURE — 90662 IIV NO PRSV INCREASED AG IM: CPT | Performed by: INTERNAL MEDICINE

## 2018-11-29 PROCEDURE — G0008 ADMIN INFLUENZA VIRUS VAC: HCPCS | Performed by: INTERNAL MEDICINE

## 2018-11-29 PROCEDURE — G0009 ADMIN PNEUMOCOCCAL VACCINE: HCPCS | Performed by: INTERNAL MEDICINE

## 2018-11-29 PROCEDURE — 99204 OFFICE O/P NEW MOD 45 MIN: CPT | Performed by: INTERNAL MEDICINE

## 2018-11-29 PROCEDURE — 90670 PCV13 VACCINE IM: CPT | Performed by: INTERNAL MEDICINE

## 2018-11-29 RX ORDER — TIZANIDINE 4 MG/1
4 TABLET ORAL 2 TIMES DAILY
Qty: 180 TABLET | Refills: 1 | Status: SHIPPED | OUTPATIENT
Start: 2018-11-29 | End: 2019-07-23 | Stop reason: SDUPTHER

## 2018-11-29 RX ORDER — BACLOFEN 10 MG/1
10 TABLET ORAL 2 TIMES DAILY
Qty: 180 TABLET | Refills: 1 | Status: SHIPPED | OUTPATIENT
Start: 2018-11-29 | End: 2019-07-23 | Stop reason: SDUPTHER

## 2018-11-29 RX ORDER — LEVOTHYROXINE SODIUM 0.05 MG/1
50 TABLET ORAL DAILY
Qty: 90 TABLET | Refills: 3 | Status: SHIPPED | OUTPATIENT
Start: 2018-11-29 | End: 2019-07-23 | Stop reason: SDUPTHER

## 2018-11-29 RX ORDER — DULOXETIN HYDROCHLORIDE 60 MG/1
60 CAPSULE, DELAYED RELEASE ORAL DAILY
Qty: 90 CAPSULE | Refills: 3 | Status: SHIPPED | OUTPATIENT
Start: 2018-11-29 | End: 2019-07-23 | Stop reason: SDUPTHER

## 2018-11-29 RX ORDER — PANTOPRAZOLE SODIUM 40 MG/1
40 TABLET, DELAYED RELEASE ORAL 2 TIMES DAILY
Qty: 180 TABLET | Refills: 3 | Status: SHIPPED | OUTPATIENT
Start: 2018-11-29 | End: 2020-01-13

## 2018-11-29 RX ORDER — METOCLOPRAMIDE 10 MG/1
10 TABLET ORAL 4 TIMES DAILY
Qty: 360 TABLET | Refills: 3 | Status: SHIPPED | OUTPATIENT
Start: 2018-11-29 | End: 2020-01-13

## 2018-11-29 RX ORDER — LISINOPRIL 5 MG/1
5 TABLET ORAL DAILY
Qty: 90 TABLET | Refills: 3 | Status: SHIPPED | OUTPATIENT
Start: 2018-11-29 | End: 2019-06-17 | Stop reason: HOSPADM

## 2018-11-29 RX ORDER — TOPIRAMATE 25 MG/1
25 TABLET ORAL 2 TIMES DAILY
Qty: 180 TABLET | Refills: 3 | Status: SHIPPED | OUTPATIENT
Start: 2018-11-29 | End: 2019-10-13 | Stop reason: SDUPTHER

## 2018-11-29 RX ORDER — FUROSEMIDE 40 MG/1
40 TABLET ORAL DAILY
Qty: 90 TABLET | Refills: 3 | Status: ON HOLD | OUTPATIENT
Start: 2018-11-29 | End: 2019-06-17 | Stop reason: SDUPTHER

## 2018-11-29 RX ORDER — ALLOPURINOL 300 MG/1
300 TABLET ORAL DAILY
Qty: 90 TABLET | Refills: 3 | Status: SHIPPED | OUTPATIENT
Start: 2018-11-29 | End: 2019-07-23 | Stop reason: SDUPTHER

## 2018-11-29 RX ORDER — POTASSIUM CHLORIDE 20 MEQ/1
20 TABLET, EXTENDED RELEASE ORAL 2 TIMES DAILY
Qty: 180 TABLET | Refills: 3 | Status: SHIPPED | OUTPATIENT
Start: 2018-11-29 | End: 2019-07-23 | Stop reason: SDUPTHER

## 2018-11-29 RX ORDER — PREDNISONE 1 MG/1
5 TABLET ORAL DAILY
Qty: 90 TABLET | Refills: 1 | Status: ON HOLD | OUTPATIENT
Start: 2018-11-29 | End: 2019-06-17 | Stop reason: SDUPTHER

## 2018-11-29 NOTE — PROGRESS NOTES
Subjective   Alison Colvin is a 68 y.o. female. Patient is here today for establishing care as a new patient with me.  She was seeing Dr. Herron he retired.  Patient has numerous problems including migraines, history of paroxysmal atrial fibrillation, DVT and pulmonary embolus with a vena caval filter placed.  She has hypertension, migraine headaches, COPD and uses oxygen at night.  She has a history of GERD and ulcer disease in the past and is controlled on Protonix twice a day.  She has hypothyroidism by history, peripheral neuropathy, osteoarthritis, chronic kidney disease stage III, history of anemia.  Patient is on Coumadin and checks her INR at home weekly.  The most recent was high at 4.0 taking 5 mg of Coumadin daily.  Her previous dose was 5 mg 2 days a week and 2-1/2 the other days and she was running low on that.  Overall the patient's doing well currently and is had no chest pain, shortness of breath, edema  Chief Complaint   Patient presents with   • Hypothyroidism     pt needs refills for medications           Vitals:    11/29/18 1312   BP: 112/72   Pulse: 90   Temp: 98.4 °F (36.9 °C)   SpO2: 99%     The following portions of the patient's history were reviewed and updated as appropriate: allergies, current medications, past family history, past medical history, past social history, past surgical history and problem list.    Past Medical History:   Diagnosis Date   • Abnormal nuclear stress test    • Acute sinusitis    • Allergy    • Benign essential hypertension    • Cellulitis    • Chest pain    • COPD (chronic obstructive pulmonary disease) (CMS/[facility])    • Disease of thyroid gland    • Edema of leg    • Epilepsy (CMS/[facility])    • Esophageal reflux    • Foot pain    • Myalgia and myositis    • Onychomycosis of toenail    • Transient cerebral ischemia    • URI (upper respiratory infection)       Allergies   Allergen Reactions   • Pneumococcal Vaccines Delirium     Fever, chills   • Asa [Aspirin]    •  Levofloxacin    • Meperidine Other (See Comments)   • Naproxen    • Pregabalin Other (See Comments)   • Propoxyphene    • Sulfa Antibiotics Other (See Comments)      Social History     Socioeconomic History   • Marital status:      Spouse name: Not on file   • Number of children: Not on file   • Years of education: Not on file   • Highest education level: Not on file   Social Needs   • Financial resource strain: Not on file   • Food insecurity - worry: Not on file   • Food insecurity - inability: Not on file   • Transportation needs - medical: Not on file   • Transportation needs - non-medical: Not on file   Occupational History   • Not on file   Tobacco Use   • Smoking status: Never Smoker   • Smokeless tobacco: Never Used   Substance and Sexual Activity   • Alcohol use: No   • Drug use: No   • Sexual activity: Not on file   Other Topics Concern   • Not on file   Social History Narrative   • Not on file        Current Outpatient Medications:   •  allopurinol (ZYLOPRIM) 300 MG tablet, Take 1 tablet by mouth Daily., Disp: 90 tablet, Rfl: 3  •  baclofen (LIORESAL) 10 MG tablet, Take 1 tablet by mouth 2 (Two) Times a Day., Disp: 180 tablet, Rfl: 1  •  BROVANA 15 MCG/2ML nebulizer solution, USE 1 VIAL PER NEBULIZER BID, Disp: , Rfl: 5  •  budesonide (PULMICORT) 1 MG/2ML nebulizer solution, Pulmicort SUSP; Patient Sig: Pulmicort SUSP USE 1 UNIT DOSE VIA NEBULIZER TWICE DAILY; 0; 25-Mar-2013; Active, Disp: , Rfl:   •  calcium carbonate (OS-SEBASTIÁN) 600 MG tablet, Take 600 mg by mouth 2 (two) times a day with meals., Disp: , Rfl:   •  conjugated estrogens (PREMARIN) vaginal cream, Insert 0.625 g into the vagina as needed (vaginal dryness)., Disp: , Rfl:   •  DULoxetine (CYMBALTA) 60 MG capsule, Take 1 capsule by mouth Daily., Disp: 90 capsule, Rfl: 3  •  fluticasone (FLONASE) 50 MCG/ACT nasal spray, into each nostril As Needed., Disp: , Rfl:   •  furosemide (LASIX) 40 MG tablet, Take 1 tablet by mouth Daily., Disp: 90  tablet, Rfl: 3  •  HYDROcodone-acetaminophen (NORCO)  MG per tablet, TK 1 T PO Q 8 H PRF PAIN, Disp: , Rfl: 0  •  ipratropium-albuterol (DUO-NEB) 0.5-2.5 mg/mL nebulizer, USE 1 VIAL PER NEBULIZER QID, Disp: , Rfl: 5  •  levothyroxine (SYNTHROID, LEVOTHROID) 50 MCG tablet, Take 1 tablet by mouth Daily., Disp: 90 tablet, Rfl: 3  •  lisinopril (PRINIVIL,ZESTRIL) 5 MG tablet, Take 1 tablet by mouth Daily., Disp: 90 tablet, Rfl: 3  •  metoclopramide (REGLAN) 10 MG tablet, Take 1 tablet by mouth 4 (Four) Times a Day., Disp: 360 tablet, Rfl: 3  •  Multiple Vitamins-Minerals (WOMENS 50+ MULTI VITAMIN/MIN PO), Take 1 tablet by mouth daily., Disp: , Rfl:   •  nystatin (MYCOSTATIN) 244058 UNIT/ML suspension, Swish and swallow 5 mL 4 (four) times a day., Disp: 240 mL, Rfl: 0  •  pantoprazole (PROTONIX) 40 MG EC tablet, Take 1 tablet by mouth 2 (Two) Times a Day., Disp: 180 tablet, Rfl: 3  •  potassium chloride (K-DUR,KLOR-CON) 20 MEQ CR tablet, Take 1 tablet by mouth 2 (Two) Times a Day., Disp: 180 tablet, Rfl: 3  •  predniSONE (DELTASONE) 5 MG tablet, Take 1 tablet by mouth Daily., Disp: 90 tablet, Rfl: 1  •  tiZANidine (ZANAFLEX) 4 MG tablet, Take 1 tablet by mouth 2 (Two) Times a Day., Disp: 180 tablet, Rfl: 1  •  topiramate (TOPAMAX) 25 MG tablet, Take 1 tablet by mouth 2 (Two) Times a Day., Disp: 180 tablet, Rfl: 3  •  warfarin (COUMADIN) 5 MG tablet, TAKE 1 TABLET BY MOUTH  DAILY, Disp: 90 tablet, Rfl: 3  •  OXYGEN-HELIUM IN, Inhale., Disp: , Rfl:      Objective     History of Present Illness     Review of Systems   Constitutional: Negative.    HENT: Negative.    Eyes: Negative.    Respiratory: Negative.    Cardiovascular: Negative.    Gastrointestinal: Negative.    Genitourinary: Negative.    Musculoskeletal: Negative.    Skin: Negative.    Neurological: Negative.    Psychiatric/Behavioral: Negative.        Physical Exam   Constitutional: She is oriented to person, place, and time. She appears well-developed and  well-nourished.   Pleasant, cooperative and in no distress but obese in a wheelchair with a blood pressure of 110/70   HENT:   Head: Normocephalic and atraumatic.   Eyes: Conjunctivae are normal. Pupils are equal, round, and reactive to light. No scleral icterus.   Neck: Normal range of motion. Neck supple.   Cardiovascular: Normal rate, regular rhythm and normal heart sounds.   Pulmonary/Chest: Effort normal and breath sounds normal. No stridor. No respiratory distress. She has no wheezes. She has no rales.   Musculoskeletal: Normal range of motion. She exhibits no edema.   Neurological: She is alert and oriented to person, place, and time.   Skin: Skin is warm and dry.   Psychiatric: She has a normal mood and affect. Her behavior is normal.   Nursing note and vitals reviewed.      ASSESSMENT  overall the patient seems stable today.  She has a list of unknown and prior problems but no recent laboratory studies.  Her most recent INR was high at 4.0 and currently she is holding her Coumadin for 2 days.     Problem List Items Addressed This Visit        Cardiovascular and Mediastinum    Benign essential hypertension    Relevant Medications    lisinopril (PRINIVIL,ZESTRIL) 5 MG tablet    furosemide (LASIX) 40 MG tablet    Migraine - Primary    Relevant Medications    topiramate (TOPAMAX) 25 MG tablet    tiZANidine (ZANAFLEX) 4 MG tablet    DULoxetine (CYMBALTA) 60 MG capsule    baclofen (LIORESAL) 10 MG tablet    allopurinol (ZYLOPRIM) 300 MG tablet    PAF (paroxysmal atrial fibrillation) (CMS/HCC)       Respiratory    Chronic intermittent hypoxia with obstructive sleep apnea       Digestive    Gastroesophageal reflux disease    Relevant Medications    pantoprazole (PROTONIX) 40 MG EC tablet    metoclopramide (REGLAN) 10 MG tablet       Endocrine    Hypothyroidism    Relevant Medications    predniSONE (DELTASONE) 5 MG tablet    levothyroxine (SYNTHROID, LEVOTHROID) 50 MCG tablet       Nervous and Auditory    Peripheral  neuropathy       Genitourinary    Chronic renal impairment, stage 3 (moderate) (CMS/HCC)    Relevant Medications    furosemide (LASIX) 40 MG tablet       Other    Gout with tophus    History of pulmonary embolism    History of deep vein thrombophlebitis of lower extremity    History of inferior vena caval filter placement          PLAN  the patient will continue her current medicines as now.  I'm going to change her Coumadin dose to 5 mg 4 days a week and a half a tablet, 2-1/2 mg on Monday Wednesday and Friday.  The patient will continue to check her INR at home.  I will reschedule the patient come back in about 6 weeks and she will need to have a CBC, CMP, lipid panel, TSH and free T4, INR and pro time, and uric acid level drawn before her visit so I can review the results    There are no Patient Instructions on file for this visit.  Return in about 6 weeks (around 1/10/2019) for with labs.

## 2018-12-04 ENCOUNTER — TELEPHONE (OUTPATIENT)
Dept: FAMILY MEDICINE CLINIC | Facility: CLINIC | Age: 68
End: 2018-12-04

## 2018-12-04 NOTE — TELEPHONE ENCOUNTER
-----patient informed and understands instructions     Message from Nathan Shipman MD sent at 12/4/2018 11:50 AM EST -----  Her INR is okay.  Continue current Coumadin and recheck it weekly  ----- Message -----  From: Ijeoma Crum MA  Sent: 12/4/2018  10:30 AM  To: Nathan Shipman MD    Please review and advise  ----- Message -----  From: Jennifer Maria MA  Sent: 12/4/2018  10:25 AM  To: Ijeoma Curm MA        ----- Message -----  From: Nicole Campos  Sent: 12/4/2018  10:22 AM  To: MIAN Cruz MD INR CALLED     OUT OF RANGE TEST  FOR THE INR       TAKEN TODAY       2.0    THEY WERE ASKED TO CALL ANYTHING BELOW 2.5     UNAWARE OF PT FREQUENCY OF MEDS     PLEASE CALL [facility]     [phone]  THEY ORIGINALLY CALLED PT [facility]LD PCP

## 2019-01-02 DIAGNOSIS — Z79.01 ANTICOAGULANT LONG-TERM USE: ICD-10-CM

## 2019-01-02 DIAGNOSIS — M10.9 GOUT, UNSPECIFIED CAUSE, UNSPECIFIED CHRONICITY, UNSPECIFIED SITE: Primary | ICD-10-CM

## 2019-01-02 DIAGNOSIS — E03.9 HYPOTHYROIDISM, UNSPECIFIED TYPE: ICD-10-CM

## 2019-01-02 DIAGNOSIS — Z79.899 LONG TERM USE OF DRUG: ICD-10-CM

## 2019-01-02 DIAGNOSIS — E78.5 HYPERLIPIDEMIA, UNSPECIFIED HYPERLIPIDEMIA TYPE: ICD-10-CM

## 2019-01-04 ENCOUNTER — OFFICE VISIT (OUTPATIENT)
Dept: FAMILY MEDICINE CLINIC | Facility: CLINIC | Age: 69
End: 2019-01-04

## 2019-01-04 VITALS
HEIGHT: 62 IN | SYSTOLIC BLOOD PRESSURE: 100 MMHG | DIASTOLIC BLOOD PRESSURE: 70 MMHG | TEMPERATURE: 97 F | RESPIRATION RATE: 16 BRPM | OXYGEN SATURATION: 93 % | BODY MASS INDEX: 35.67 KG/M2 | HEART RATE: 74 BPM

## 2019-01-04 DIAGNOSIS — G47.34 CHRONIC INTERMITTENT HYPOXIA WITH OBSTRUCTIVE SLEEP APNEA: ICD-10-CM

## 2019-01-04 DIAGNOSIS — M19.041 OSTEOARTHRITIS OF BOTH HANDS, UNSPECIFIED OSTEOARTHRITIS TYPE: ICD-10-CM

## 2019-01-04 DIAGNOSIS — I10 BENIGN ESSENTIAL HYPERTENSION: ICD-10-CM

## 2019-01-04 DIAGNOSIS — Z74.1 ASSISTANCE NEEDED FOR MOBILITY: Primary | ICD-10-CM

## 2019-01-04 DIAGNOSIS — M1A.9XX1 GOUT WITH TOPHUS: ICD-10-CM

## 2019-01-04 DIAGNOSIS — I82.220 INFERIOR VENA CAVA OCCLUSION (HCC): ICD-10-CM

## 2019-01-04 DIAGNOSIS — I48.0 PAF (PAROXYSMAL ATRIAL FIBRILLATION) (HCC): ICD-10-CM

## 2019-01-04 DIAGNOSIS — M51.37 DEGENERATION OF INTERVERTEBRAL DISC OF LUMBOSACRAL REGION: ICD-10-CM

## 2019-01-04 DIAGNOSIS — G47.33 CHRONIC INTERMITTENT HYPOXIA WITH OBSTRUCTIVE SLEEP APNEA: ICD-10-CM

## 2019-01-04 DIAGNOSIS — J98.4 RESTRICTIVE LUNG DISEASE: ICD-10-CM

## 2019-01-04 DIAGNOSIS — Z95.828 HISTORY OF INFERIOR VENA CAVAL FILTER PLACEMENT: ICD-10-CM

## 2019-01-04 DIAGNOSIS — G62.89 OTHER POLYNEUROPATHY: ICD-10-CM

## 2019-01-04 DIAGNOSIS — Z86.72 HISTORY OF DEEP VEIN THROMBOPHLEBITIS OF LOWER EXTREMITY: ICD-10-CM

## 2019-01-04 DIAGNOSIS — M19.042 OSTEOARTHRITIS OF BOTH HANDS, UNSPECIFIED OSTEOARTHRITIS TYPE: ICD-10-CM

## 2019-01-04 DIAGNOSIS — G47.34 NOCTURNAL HYPOXIA: ICD-10-CM

## 2019-01-04 PROCEDURE — 99214 OFFICE O/P EST MOD 30 MIN: CPT | Performed by: INTERNAL MEDICINE

## 2019-01-05 LAB
ALBUMIN SERPL-MCNC: 4.4 G/DL (ref 3.5–5.2)
ALBUMIN/GLOB SERPL: 2.3 G/DL
ALP SERPL-CCNC: 50 U/L (ref 39–117)
ALT SERPL-CCNC: 13 U/L (ref 1–33)
AST SERPL-CCNC: 16 U/L (ref 1–32)
BASOPHILS # BLD AUTO: 0.04 10*3/MM3 (ref 0–0.2)
BASOPHILS NFR BLD AUTO: 0.5 % (ref 0–1.5)
BILIRUB SERPL-MCNC: 0.3 MG/DL (ref 0.1–1.2)
BUN SERPL-MCNC: 20 MG/DL (ref 8–23)
BUN/CREAT SERPL: 13.7 (ref 7–25)
CALCIUM SERPL-MCNC: 9.7 MG/DL (ref 8.6–10.5)
CHLORIDE SERPL-SCNC: 99 MMOL/L (ref 98–107)
CHOLEST SERPL-MCNC: 224 MG/DL (ref 0–200)
CO2 SERPL-SCNC: 29.2 MMOL/L (ref 22–29)
CREAT SERPL-MCNC: 1.46 MG/DL (ref 0.57–1)
EOSINOPHIL # BLD AUTO: 0.17 10*3/MM3 (ref 0–0.7)
EOSINOPHIL NFR BLD AUTO: 2.3 % (ref 0.3–6.2)
ERYTHROCYTE [DISTWIDTH] IN BLOOD BY AUTOMATED COUNT: 14.8 % (ref 11.7–13)
GLOBULIN SER CALC-MCNC: 1.9 GM/DL
GLUCOSE SERPL-MCNC: 96 MG/DL (ref 65–99)
HCT VFR BLD AUTO: 38.6 % (ref 35.6–45.5)
HDLC SERPL-MCNC: 72 MG/DL (ref 40–60)
HGB BLD-MCNC: 12.3 G/DL (ref 11.9–15.5)
IMM GRANULOCYTES # BLD AUTO: 0.06 10*3/MM3 (ref 0–0.03)
IMM GRANULOCYTES NFR BLD AUTO: 0.8 % (ref 0–0.5)
INR PPP: 2.39 (ref 0.9–1.1)
LDLC SERPL CALC-MCNC: 99 MG/DL (ref 0–100)
LDLC/HDLC SERPL: 1.37 {RATIO}
LYMPHOCYTES # BLD AUTO: 1.59 10*3/MM3 (ref 0.9–4.8)
LYMPHOCYTES NFR BLD AUTO: 21.1 % (ref 19.6–45.3)
MCH RBC QN AUTO: 30.5 PG (ref 26.9–32)
MCHC RBC AUTO-ENTMCNC: 31.9 G/DL (ref 32.4–36.3)
MCV RBC AUTO: 95.8 FL (ref 80.5–98.2)
MONOCYTES # BLD AUTO: 0.38 10*3/MM3 (ref 0.2–1.2)
MONOCYTES NFR BLD AUTO: 5 % (ref 5–12)
NEUTROPHILS # BLD AUTO: 5.36 10*3/MM3 (ref 1.9–8.1)
NEUTROPHILS NFR BLD AUTO: 71.1 % (ref 42.7–76)
PLATELET # BLD AUTO: 136 10*3/MM3 (ref 140–500)
POTASSIUM SERPL-SCNC: 4.2 MMOL/L (ref 3.5–5.2)
PROT SERPL-MCNC: 6.3 G/DL (ref 6–8.5)
PROTHROMBIN TIME: 25.7 SECONDS (ref 11.7–14.2)
RBC # BLD AUTO: 4.03 10*6/MM3 (ref 3.9–5.2)
SODIUM SERPL-SCNC: 139 MMOL/L (ref 136–145)
T4 FREE SERPL-MCNC: 1.17 NG/DL (ref 0.93–1.7)
TRIGL SERPL-MCNC: 266 MG/DL (ref 0–150)
TSH SERPL DL<=0.005 MIU/L-ACNC: 1.75 MIU/ML (ref 0.27–4.2)
URATE SERPL-MCNC: 4.3 MG/DL (ref 2.4–5.7)
VLDLC SERPL CALC-MCNC: 53.2 MG/DL (ref 5–40)
WBC # BLD AUTO: 7.54 10*3/MM3 (ref 4.5–10.7)

## 2019-01-08 ENCOUNTER — TELEPHONE (OUTPATIENT)
Dept: FAMILY MEDICINE CLINIC | Facility: CLINIC | Age: 69
End: 2019-01-08

## 2019-01-22 ENCOUNTER — TELEPHONE (OUTPATIENT)
Dept: FAMILY MEDICINE CLINIC | Facility: CLINIC | Age: 69
End: 2019-01-22

## 2019-01-22 NOTE — TELEPHONE ENCOUNTER
-----spoke to patient she has set up an appt to complete the paperwork     Message from Nicole Campos sent at 1/14/2019 10:00 AM EST -----  [facility]    THEY FAXED A REQUEST FOR CORRECTION  ON JAN 12   WANTING TO KNOW THAT WAS RECIEVED  THE EXPIRATION DATE FOR THIS IS 2/17/19    PAIN IS THE DX     ANDTHEY ARE NEEDING OBJECTION MEASURE MENTS AND OTHER CORRECTIONS THAT NEED TO BE MADE IN THE PAPERWORK     PLEASE CALL TRACY CAMPO   [phone] REF # [id number]

## 2019-01-23 ENCOUNTER — OFFICE VISIT (OUTPATIENT)
Dept: FAMILY MEDICINE CLINIC | Facility: CLINIC | Age: 69
End: 2019-01-23

## 2019-01-23 VITALS
HEART RATE: 78 BPM | BODY MASS INDEX: 35.67 KG/M2 | HEIGHT: 62 IN | SYSTOLIC BLOOD PRESSURE: 100 MMHG | OXYGEN SATURATION: 98 % | TEMPERATURE: 97.6 F | RESPIRATION RATE: 16 BRPM | DIASTOLIC BLOOD PRESSURE: 70 MMHG

## 2019-01-23 DIAGNOSIS — M1A.9XX1 GOUT WITH TOPHUS: ICD-10-CM

## 2019-01-23 DIAGNOSIS — M19.042 OSTEOARTHRITIS OF BOTH HANDS, UNSPECIFIED OSTEOARTHRITIS TYPE: ICD-10-CM

## 2019-01-23 DIAGNOSIS — D63.8 ANEMIA OF CHRONIC DISORDER: ICD-10-CM

## 2019-01-23 DIAGNOSIS — J45.40 MODERATE PERSISTENT ASTHMA WITHOUT COMPLICATION: ICD-10-CM

## 2019-01-23 DIAGNOSIS — I48.0 PAF (PAROXYSMAL ATRIAL FIBRILLATION) (HCC): ICD-10-CM

## 2019-01-23 DIAGNOSIS — E03.9 ACQUIRED HYPOTHYROIDISM: ICD-10-CM

## 2019-01-23 DIAGNOSIS — M19.041 OSTEOARTHRITIS OF BOTH HANDS, UNSPECIFIED OSTEOARTHRITIS TYPE: ICD-10-CM

## 2019-01-23 DIAGNOSIS — K21.9 GASTROESOPHAGEAL REFLUX DISEASE, ESOPHAGITIS PRESENCE NOT SPECIFIED: ICD-10-CM

## 2019-01-23 DIAGNOSIS — I10 BENIGN ESSENTIAL HYPERTENSION: Primary | ICD-10-CM

## 2019-01-23 DIAGNOSIS — Z23 NEED FOR VACCINATION: ICD-10-CM

## 2019-01-23 DIAGNOSIS — M51.37 DEGENERATION OF INTERVERTEBRAL DISC OF LUMBOSACRAL REGION: ICD-10-CM

## 2019-01-23 DIAGNOSIS — J98.4 RESTRICTIVE LUNG DISEASE: ICD-10-CM

## 2019-01-23 DIAGNOSIS — M79.7 FIBROMYALGIA: ICD-10-CM

## 2019-01-23 DIAGNOSIS — R32 URINARY INCONTINENCE, UNSPECIFIED TYPE: ICD-10-CM

## 2019-01-23 PROCEDURE — 99214 OFFICE O/P EST MOD 30 MIN: CPT | Performed by: INTERNAL MEDICINE

## 2019-01-25 PROCEDURE — 90715 TDAP VACCINE 7 YRS/> IM: CPT | Performed by: INTERNAL MEDICINE

## 2019-01-25 PROCEDURE — 90471 IMMUNIZATION ADMIN: CPT | Performed by: INTERNAL MEDICINE

## 2019-01-25 NOTE — PROGRESS NOTES
Subjective   Alison Colvin is a 68 y.o. female. Patient is here today for follow-up on her hypertension, hyperlipidemia, history of gout and anemia, history of restrictive lung disease, asthma and COPD and arthritis and chronic back pain.  Patient continues in a wheelchair.  She's having no chest pain, shortness of breath currently, significant edema does have generalized myalgias related to fibromyalgia and her arthritic problems.  Chief Complaint   Patient presents with   • Immobility          Vitals:    01/23/19 1547   BP: 100/70   Pulse: 78   Resp: 16   Temp: 97.6 °F (36.4 °C)   SpO2: 98%     The following portions of the patient's history were reviewed and updated as appropriate: allergies, current medications, past family history, past medical history, past social history, past surgical history and problem list.    Past Medical History:   Diagnosis Date   • Abnormal nuclear stress test    • Acute sinusitis    • Allergy    • Benign essential hypertension    • Cellulitis    • Chest pain    • COPD (chronic obstructive pulmonary disease) (CMS/[facility])    • Disease of thyroid gland    • Edema of leg    • Epilepsy (CMS/[facility])    • Esophageal reflux    • Foot pain    • Myalgia and myositis    • Onychomycosis of toenail    • Transient cerebral ischemia    • URI (upper respiratory infection)       Allergies   Allergen Reactions   • Pneumococcal Vaccines Delirium     Fever, chills   • Asa [Aspirin]    • Levofloxacin    • Meperidine Other (See Comments)   • Naproxen    • Pregabalin Other (See Comments)   • Propoxyphene    • Sulfa Antibiotics Other (See Comments)      Social History     Socioeconomic History   • Marital status:      Spouse name: Not on file   • Number of children: Not on file   • Years of education: Not on file   • Highest education level: Not on file   Social Needs   • Financial resource strain: Not on file   • Food insecurity - worry: Not on file   • Food insecurity - inability: Not on file   •  Transportation needs - medical: Not on file   • Transportation needs - non-medical: Not on file   Occupational History   • Not on file   Tobacco Use   • Smoking status: Never Smoker   • Smokeless tobacco: Never Used   Substance and Sexual Activity   • Alcohol use: No   • Drug use: No   • Sexual activity: Not on file   Other Topics Concern   • Not on file   Social History Narrative   • Not on file        Current Outpatient Medications:   •  allopurinol (ZYLOPRIM) 300 MG tablet, Take 1 tablet by mouth Daily., Disp: 90 tablet, Rfl: 3  •  baclofen (LIORESAL) 10 MG tablet, Take 1 tablet by mouth 2 (Two) Times a Day., Disp: 180 tablet, Rfl: 1  •  BROVANA 15 MCG/2ML nebulizer solution, USE 1 VIAL PER NEBULIZER BID, Disp: , Rfl: 5  •  budesonide (PULMICORT) 1 MG/2ML nebulizer solution, Pulmicort SUSP; Patient Sig: Pulmicort SUSP USE 1 UNIT DOSE VIA NEBULIZER TWICE DAILY; 0; 25-Mar-2013; Active, Disp: , Rfl:   •  calcium carbonate (OS-SEBASTIÁN) 600 MG tablet, Take 600 mg by mouth 2 (two) times a day with meals., Disp: , Rfl:   •  conjugated estrogens (PREMARIN) vaginal cream, Insert 0.625 g into the vagina as needed (vaginal dryness)., Disp: , Rfl:   •  DULoxetine (CYMBALTA) 60 MG capsule, Take 1 capsule by mouth Daily., Disp: 90 capsule, Rfl: 3  •  fluticasone (FLONASE) 50 MCG/ACT nasal spray, into each nostril As Needed., Disp: , Rfl:   •  furosemide (LASIX) 40 MG tablet, Take 1 tablet by mouth Daily., Disp: 90 tablet, Rfl: 3  •  HYDROcodone-acetaminophen (NORCO)  MG per tablet, TK 1 T PO Q 8 H PRF PAIN, Disp: , Rfl: 0  •  ipratropium-albuterol (DUO-NEB) 0.5-2.5 mg/mL nebulizer, USE 1 VIAL PER NEBULIZER QID, Disp: , Rfl: 5  •  levothyroxine (SYNTHROID, LEVOTHROID) 50 MCG tablet, Take 1 tablet by mouth Daily., Disp: 90 tablet, Rfl: 3  •  lisinopril (PRINIVIL,ZESTRIL) 5 MG tablet, Take 1 tablet by mouth Daily., Disp: 90 tablet, Rfl: 3  •  metoclopramide (REGLAN) 10 MG tablet, Take 1 tablet by mouth 4 (Four) Times a Day.,  Disp: 360 tablet, Rfl: 3  •  Multiple Vitamins-Minerals (WOMENS 50+ MULTI VITAMIN/MIN PO), Take 1 tablet by mouth daily., Disp: , Rfl:   •  nystatin (MYCOSTATIN) 087985 UNIT/ML suspension, Swish and swallow 5 mL 4 (four) times a day., Disp: 240 mL, Rfl: 0  •  OXYGEN-HELIUM IN, Inhale., Disp: , Rfl:   •  pantoprazole (PROTONIX) 40 MG EC tablet, Take 1 tablet by mouth 2 (Two) Times a Day., Disp: 180 tablet, Rfl: 3  •  potassium chloride (K-DUR,KLOR-CON) 20 MEQ CR tablet, Take 1 tablet by mouth 2 (Two) Times a Day., Disp: 180 tablet, Rfl: 3  •  predniSONE (DELTASONE) 5 MG tablet, Take 1 tablet by mouth Daily., Disp: 90 tablet, Rfl: 1  •  tiZANidine (ZANAFLEX) 4 MG tablet, Take 1 tablet by mouth 2 (Two) Times a Day., Disp: 180 tablet, Rfl: 1  •  topiramate (TOPAMAX) 25 MG tablet, Take 1 tablet by mouth 2 (Two) Times a Day., Disp: 180 tablet, Rfl: 3  •  warfarin (COUMADIN) 5 MG tablet, TAKE 1 TABLET BY MOUTH  DAILY, Disp: 90 tablet, Rfl: 3     Objective     History of Present Illness     Review of Systems   HENT: Negative.    Eyes: Negative.    Respiratory: Negative.    Cardiovascular: Negative.    Gastrointestinal: Negative.    Genitourinary: Negative.    Musculoskeletal: Positive for arthralgias, back pain, gait problem and myalgias.   Skin: Negative.    Neurological: Positive for weakness.   Psychiatric/Behavioral: Negative.        Physical Exam   Constitutional: She is oriented to person, place, and time. She appears well-developed and well-nourished.   Pleasant, cooperative but wheelchair-bound   HENT:   Head: Normocephalic and atraumatic.   Eyes: Conjunctivae are normal. Pupils are equal, round, and reactive to light. No scleral icterus.   Neck: Normal range of motion.   Cardiovascular: Normal rate and normal heart sounds.   Pulmonary/Chest: Effort normal. No respiratory distress. She has no wheezes. She has no rales.   Musculoskeletal: She exhibits tenderness.   Neurological: She is alert and oriented to person,  place, and time.   Skin: Skin is warm and dry.   Psychiatric: She has a normal mood and affect. Her behavior is normal.   Nursing note and vitals reviewed.      ASSESSMENT  CBC was essentially normal with normal hemoglobin and hematocrit and platelets minimally low at 136,000.  CMP had a creatinine stable at 1.46 and was otherwise normal.  Lipid panel is bit high with total cholesterol 224, HDL 72, LDL of 99 and triglycerides 266.  TSH and free T4 were both normal.  INR was therapeutic at 2.39.  Uric acid levels well controlled at 4.3.  #1-hypertension, reasonably controlled  #2-paroxysmal atrial fibrillation, rate controlled  #3-asthma and restrictive lung disease and COPD, currently stable  #4-history of GERD, stable on medications  #5-history of anemia with normal hemoglobin and hematocrit today and minimally low platelets  #6-chronic kidney disease, asymptomatic and stable  #7-gout, well controlled on medication     Problem List Items Addressed This Visit        Cardiovascular and Mediastinum    Benign essential hypertension - Primary    PAF (paroxysmal atrial fibrillation) (CMS/HCC)       Respiratory    Asthma    Restrictive lung disease       Digestive    Gastroesophageal reflux disease       Endocrine    Hypothyroidism       Nervous and Auditory    Fibromyalgia       Musculoskeletal and Integument    Degeneration of intervertebral disc of lumbosacral region    Osteoarthritis of both hands       Genitourinary    Urinary incontinence       Hematopoietic and Hemostatic    Anemia of chronic disorder       Other    Gout with tophus          PLAN  the patient received a Tdap immunization today and we'll continue her current medicines as now.  I plan on rechecking her in 4 months with a CBC, CMP, lipid panel, TSH, uric acid level and pro time and INR    There are no Patient Instructions on file for this visit.  Return in about 4 months (around 5/23/2019) for with labs.

## 2019-02-06 ENCOUNTER — TELEPHONE (OUTPATIENT)
Dept: FAMILY MEDICINE CLINIC | Facility: CLINIC | Age: 69
End: 2019-02-06

## 2019-02-06 NOTE — TELEPHONE ENCOUNTER
----SPOKE TO PATIENT    - Message from Malaika Quintana MA sent at 2/5/2019  2:48 PM EST -----  Contact: [facility] called today and stated they had an out of range PT INR 1.2. Please call @ [phone] and advised what you would like to change Warfarin dosage to or if you need her to change to other # is [phone].

## 2019-02-06 NOTE — TELEPHONE ENCOUNTER
-----spoke to patient she understands instructions     Message from Nathan Shipman MD sent at 2/6/2019 10:40 AM EST -----  Contact: [facility]  I would take an extra Coumadin 5 mg today and then continue her 5 mg daily and recheck a PT and INR on Friday          ----- Message -----  From: Ijeoma Crum MA  Sent: 2/5/2019   4:26 PM  To: Nathan Shipman MD        ----- Message -----  From: Malaika Quintana MA  Sent: 2/5/2019   2:48 PM  To: Ijeoma Crum MA MD INR called today and stated they had an out of range PT INR 1.2. Please call @ [phone] and advised what you would like to change Warfarin dosage to or if you need her to change to other # is [phone].

## 2019-03-20 ENCOUNTER — TELEPHONE (OUTPATIENT)
Dept: FAMILY MEDICINE CLINIC | Facility: CLINIC | Age: 69
End: 2019-03-20

## 2019-03-20 NOTE — TELEPHONE ENCOUNTER
----patient informed    - Message from Nathan Shipman MD sent at 3/19/2019  4:09 PM EDT -----  Continue her same dose of Coumadin      ----- Message -----  From: Ijeoma Crum MA  Sent: 3/19/2019   3:50 PM  To: Nathan Shipman MD    PLEASE REVIEW AND ADVISE  ----- Message -----  From: Nicole Campos  Sent: 3/19/2019   3:22 PM  To: Ijeoma Crum MA MD INR CALLING WITH AN OUT OF RANGE INR     INR 1.9 TODAY     PLEASE CALL [facility]   [phone]

## 2019-05-21 DIAGNOSIS — E03.9 ACQUIRED HYPOTHYROIDISM: ICD-10-CM

## 2019-05-21 DIAGNOSIS — I10 BENIGN ESSENTIAL HYPERTENSION: Primary | ICD-10-CM

## 2019-05-21 DIAGNOSIS — M1A.9XX1 GOUT WITH TOPHUS: ICD-10-CM

## 2019-05-21 DIAGNOSIS — Z79.01 ANTICOAGULANT LONG-TERM USE: ICD-10-CM

## 2019-05-21 DIAGNOSIS — Z86.72 HISTORY OF DEEP VEIN THROMBOPHLEBITIS OF LOWER EXTREMITY: ICD-10-CM

## 2019-05-21 DIAGNOSIS — Z86.711 HISTORY OF PULMONARY EMBOLISM: ICD-10-CM

## 2019-05-21 DIAGNOSIS — D63.8 ANEMIA OF CHRONIC DISORDER: ICD-10-CM

## 2019-05-29 LAB
ALBUMIN SERPL-MCNC: 4.2 G/DL (ref 3.5–5.2)
ALBUMIN/GLOB SERPL: 1.6 G/DL
ALP SERPL-CCNC: 56 U/L (ref 39–117)
ALT SERPL-CCNC: 10 U/L (ref 1–33)
AST SERPL-CCNC: 15 U/L (ref 1–32)
BASOPHILS # BLD AUTO: (no result) 10*3/UL
BASOPHILS # BLD MANUAL: 0.08 10*3/MM3 (ref 0–0.2)
BASOPHILS NFR BLD MANUAL: 1 % (ref 0–1.5)
BILIRUB SERPL-MCNC: 0.3 MG/DL (ref 0.2–1.2)
BUN SERPL-MCNC: 23 MG/DL (ref 8–23)
BUN/CREAT SERPL: 12.2 (ref 7–25)
CALCIUM SERPL-MCNC: 10.3 MG/DL (ref 8.6–10.5)
CHLORIDE SERPL-SCNC: 102 MMOL/L (ref 98–107)
CHOLEST SERPL-MCNC: 221 MG/DL (ref 0–200)
CO2 SERPL-SCNC: 27.6 MMOL/L (ref 22–29)
CREAT SERPL-MCNC: 1.89 MG/DL (ref 0.57–1)
DIFFERENTIAL COMMENT: ABNORMAL
EOSINOPHIL # BLD AUTO: (no result) 10*3/UL
EOSINOPHIL # BLD MANUAL: 0.24 10*3/MM3 (ref 0–0.4)
EOSINOPHIL NFR BLD AUTO: (no result) %
EOSINOPHIL NFR BLD MANUAL: 3 % (ref 0.3–6.2)
ERYTHROCYTE [DISTWIDTH] IN BLOOD BY AUTOMATED COUNT: 14.6 % (ref 12.3–15.4)
GLOBULIN SER CALC-MCNC: 2.6 GM/DL
GLUCOSE SERPL-MCNC: 108 MG/DL (ref 65–99)
HCT VFR BLD AUTO: 37.5 % (ref 34–46.6)
HDLC SERPL-MCNC: 65 MG/DL (ref 40–60)
HGB BLD-MCNC: 11.3 G/DL (ref 12–15.9)
INR PPP: 1.83 (ref 0.9–1.1)
LDLC SERPL CALC-MCNC: 116 MG/DL (ref 0–100)
LDLC/HDLC SERPL: 1.78 {RATIO}
LYMPHOCYTES # BLD AUTO: (no result) 10*3/UL
LYMPHOCYTES # BLD MANUAL: 1.14 10*3/MM3 (ref 0.7–3.1)
LYMPHOCYTES NFR BLD AUTO: (no result) %
LYMPHOCYTES NFR BLD MANUAL: 14 % (ref 19.6–45.3)
MCH RBC QN AUTO: 30.5 PG (ref 26.6–33)
MCHC RBC AUTO-ENTMCNC: 30.1 G/DL (ref 31.5–35.7)
MCV RBC AUTO: 101.4 FL (ref 79–97)
MONOCYTES # BLD MANUAL: 0.57 10*3/MM3 (ref 0.1–0.9)
MONOCYTES NFR BLD AUTO: (no result) %
MONOCYTES NFR BLD MANUAL: 7 % (ref 5–12)
NEUTROPHILS # BLD MANUAL: 6.1 10*3/MM3 (ref 1.7–7)
NEUTROPHILS NFR BLD AUTO: (no result) %
NEUTROPHILS NFR BLD MANUAL: 75 % (ref 42.7–76)
PLATELET # BLD AUTO: (no result) 10*3/MM3
PLATELET BLD QL SMEAR: ABNORMAL
POTASSIUM SERPL-SCNC: 4.9 MMOL/L (ref 3.5–5.2)
PROT SERPL-MCNC: 6.8 G/DL (ref 6–8.5)
PROTHROMBIN TIME: 20.8 SECONDS (ref 11.7–14.2)
RBC # BLD AUTO: 3.7 10*6/MM3 (ref 3.77–5.28)
RBC MORPH BLD: ABNORMAL
SODIUM SERPL-SCNC: 139 MMOL/L (ref 136–145)
TRIGL SERPL-MCNC: 201 MG/DL (ref 0–150)
TSH SERPL DL<=0.005 MIU/L-ACNC: 1.63 MIU/ML (ref 0.27–4.2)
URATE SERPL-MCNC: 4.8 MG/DL (ref 2.4–5.7)
VLDLC SERPL CALC-MCNC: 40.2 MG/DL
WBC # BLD AUTO: 8.13 10*3/MM3 (ref 3.4–10.8)

## 2019-06-12 ENCOUNTER — OFFICE VISIT (OUTPATIENT)
Dept: FAMILY MEDICINE CLINIC | Facility: CLINIC | Age: 69
End: 2019-06-12

## 2019-06-12 VITALS
TEMPERATURE: 97 F | DIASTOLIC BLOOD PRESSURE: 60 MMHG | SYSTOLIC BLOOD PRESSURE: 100 MMHG | HEIGHT: 62 IN | RESPIRATION RATE: 17 BRPM | OXYGEN SATURATION: 95 % | BODY MASS INDEX: 35.67 KG/M2 | HEART RATE: 74 BPM

## 2019-06-12 DIAGNOSIS — N18.30 CHRONIC RENAL IMPAIRMENT, STAGE 3 (MODERATE) (HCC): ICD-10-CM

## 2019-06-12 DIAGNOSIS — Z23 NEED FOR VACCINATION: ICD-10-CM

## 2019-06-12 DIAGNOSIS — M19.042 OSTEOARTHRITIS OF BOTH HANDS, UNSPECIFIED OSTEOARTHRITIS TYPE: ICD-10-CM

## 2019-06-12 DIAGNOSIS — M79.7 FIBROMYALGIA: ICD-10-CM

## 2019-06-12 DIAGNOSIS — I10 BENIGN ESSENTIAL HYPERTENSION: Primary | ICD-10-CM

## 2019-06-12 DIAGNOSIS — G62.89 OTHER POLYNEUROPATHY: ICD-10-CM

## 2019-06-12 DIAGNOSIS — I48.0 PAF (PAROXYSMAL ATRIAL FIBRILLATION) (HCC): ICD-10-CM

## 2019-06-12 DIAGNOSIS — E03.9 ACQUIRED HYPOTHYROIDISM: ICD-10-CM

## 2019-06-12 DIAGNOSIS — M19.041 OSTEOARTHRITIS OF BOTH HANDS, UNSPECIFIED OSTEOARTHRITIS TYPE: ICD-10-CM

## 2019-06-12 DIAGNOSIS — M1A.9XX1 GOUT WITH TOPHUS: ICD-10-CM

## 2019-06-12 DIAGNOSIS — J45.40 MODERATE PERSISTENT ASTHMA WITHOUT COMPLICATION: ICD-10-CM

## 2019-06-12 PROCEDURE — G0009 ADMIN PNEUMOCOCCAL VACCINE: HCPCS | Performed by: INTERNAL MEDICINE

## 2019-06-12 PROCEDURE — 99214 OFFICE O/P EST MOD 30 MIN: CPT | Performed by: INTERNAL MEDICINE

## 2019-06-12 PROCEDURE — 90732 PPSV23 VACC 2 YRS+ SUBQ/IM: CPT | Performed by: INTERNAL MEDICINE

## 2019-06-12 RX ORDER — WARFARIN SODIUM 5 MG/1
TABLET ORAL
Qty: 90 TABLET | Refills: 3 | Status: SHIPPED | OUTPATIENT
Start: 2019-06-12 | End: 2022-01-17 | Stop reason: SDUPTHER

## 2019-06-12 NOTE — PROGRESS NOTES
Subjective   Alison Colvin is a 68 y.o. female. Patient is here today for follow-up on her hypertension, paroxysmal atrial fibrillation, history of asthma and COPD, hypothyroidism, fibromyalgia and peripheral neuropathy, chronic kidney disease and history of gout.  Patient's generally been stable and has had no gout attacks and is breathing pretty well.  She denies any acute complaints.  Chief Complaint   Patient presents with   • Hypertension   • Hypothyroidism          Vitals:    06/12/19 1417   BP: 100/60   Pulse: 74   Resp: 17   Temp: 97 °F (36.1 °C)   SpO2: 95%     The following portions of the patient's history were reviewed and updated as appropriate: allergies, current medications, past family history, past medical history, past social history, past surgical history and problem list.    Past Medical History:   Diagnosis Date   • Abnormal nuclear stress test    • Acute sinusitis    • Allergy    • Benign essential hypertension    • Cellulitis    • Chest pain    • COPD (chronic obstructive pulmonary disease) (CMS/[facility])    • Disease of thyroid gland    • Edema of leg    • Epilepsy (CMS/[facility])    • Esophageal reflux    • Foot pain    • Myalgia and myositis    • Onychomycosis of toenail    • Transient cerebral ischemia    • URI (upper respiratory infection)       Allergies   Allergen Reactions   • Pneumococcal Vaccines Delirium     Fever, chills   • Asa [Aspirin]    • Levofloxacin    • Meperidine Other (See Comments)   • Naproxen    • Pregabalin Other (See Comments)   • Propoxyphene    • Sulfa Antibiotics Other (See Comments)      Social History     Socioeconomic History   • Marital status:      Spouse name: Not on file   • Number of children: Not on file   • Years of education: Not on file   • Highest education level: Not on file   Tobacco Use   • Smoking status: Never Smoker   • Smokeless tobacco: Never Used   Substance and Sexual Activity   • Alcohol use: No   • Drug use: No        Current Outpatient  Medications:   •  allopurinol (ZYLOPRIM) 300 MG tablet, Take 1 tablet by mouth Daily., Disp: 90 tablet, Rfl: 3  •  baclofen (LIORESAL) 10 MG tablet, Take 1 tablet by mouth 2 (Two) Times a Day., Disp: 180 tablet, Rfl: 1  •  BROVANA 15 MCG/2ML nebulizer solution, USE 1 VIAL PER NEBULIZER BID, Disp: , Rfl: 5  •  budesonide (PULMICORT) 1 MG/2ML nebulizer solution, Pulmicort SUSP; Patient Sig: Pulmicort SUSP USE 1 UNIT DOSE VIA NEBULIZER TWICE DAILY; 0; 25-Mar-2013; Active, Disp: , Rfl:   •  calcium carbonate (OS-SEBASTIÁN) 600 MG tablet, Take 600 mg by mouth 2 (two) times a day with meals., Disp: , Rfl:   •  conjugated estrogens (PREMARIN) vaginal cream, Insert 0.625 g into the vagina as needed (vaginal dryness)., Disp: , Rfl:   •  DULoxetine (CYMBALTA) 60 MG capsule, Take 1 capsule by mouth Daily., Disp: 90 capsule, Rfl: 3  •  fluticasone (FLONASE) 50 MCG/ACT nasal spray, into each nostril As Needed., Disp: , Rfl:   •  furosemide (LASIX) 40 MG tablet, Take 1 tablet by mouth Daily., Disp: 90 tablet, Rfl: 3  •  HYDROcodone-acetaminophen (NORCO)  MG per tablet, TK 1 T PO Q 8 H PRF PAIN, Disp: , Rfl: 0  •  ipratropium-albuterol (DUO-NEB) 0.5-2.5 mg/mL nebulizer, USE 1 VIAL PER NEBULIZER QID, Disp: , Rfl: 5  •  levothyroxine (SYNTHROID, LEVOTHROID) 50 MCG tablet, Take 1 tablet by mouth Daily., Disp: 90 tablet, Rfl: 3  •  lisinopril (PRINIVIL,ZESTRIL) 5 MG tablet, Take 1 tablet by mouth Daily., Disp: 90 tablet, Rfl: 3  •  metoclopramide (REGLAN) 10 MG tablet, Take 1 tablet by mouth 4 (Four) Times a Day., Disp: 360 tablet, Rfl: 3  •  Multiple Vitamins-Minerals (WOMENS 50+ MULTI VITAMIN/MIN PO), Take 1 tablet by mouth daily., Disp: , Rfl:   •  nystatin (MYCOSTATIN) 595527 UNIT/ML suspension, Swish and swallow 5 mL 4 (four) times a day., Disp: 240 mL, Rfl: 0  •  OXYGEN-HELIUM IN, Inhale., Disp: , Rfl:   •  pantoprazole (PROTONIX) 40 MG EC tablet, Take 1 tablet by mouth 2 (Two) Times a Day., Disp: 180 tablet, Rfl: 3  •   potassium chloride (K-DUR,KLOR-CON) 20 MEQ CR tablet, Take 1 tablet by mouth 2 (Two) Times a Day., Disp: 180 tablet, Rfl: 3  •  predniSONE (DELTASONE) 5 MG tablet, Take 1 tablet by mouth Daily., Disp: 90 tablet, Rfl: 1  •  tiZANidine (ZANAFLEX) 4 MG tablet, Take 1 tablet by mouth 2 (Two) Times a Day., Disp: 180 tablet, Rfl: 1  •  topiramate (TOPAMAX) 25 MG tablet, Take 1 tablet by mouth 2 (Two) Times a Day., Disp: 180 tablet, Rfl: 3  •  warfarin (COUMADIN) 5 MG tablet, Alternate 5 mg with 2.5 mg every other day, Disp: 90 tablet, Rfl: 3     Objective     History of Present Illness     Review of Systems   Constitutional: Negative.    HENT: Negative.    Eyes: Negative.    Respiratory: Negative.    Cardiovascular: Negative.    Gastrointestinal: Negative.    Genitourinary: Negative.    Musculoskeletal: Negative.    Skin: Negative.    Neurological: Negative.    Psychiatric/Behavioral: Negative.        Physical Exam   Constitutional: She is oriented to person, place, and time. She appears well-developed and well-nourished.   Pleasant, cooperative no distress, blood pressure 120/70   HENT:   Head: Normocephalic and atraumatic.   Eyes: Conjunctivae are normal. Pupils are equal, round, and reactive to light. No scleral icterus.   Neck: Normal range of motion. Neck supple.   Cardiovascular: Normal rate, regular rhythm and normal heart sounds.   Pulmonary/Chest: Effort normal and breath sounds normal. No respiratory distress. She has no wheezes. She has no rales.   Musculoskeletal: Normal range of motion. She exhibits no edema.   Neurological: She is alert and oriented to person, place, and time.   Skin: Skin is warm and dry.   Psychiatric: She has a normal mood and affect. Her behavior is normal.   Nursing note and vitals reviewed.      ASSESSMENT CBC is stable with an RBC count of 3.7, hemoglobin 11.3 hematocrit 37.5.  CMP had a mildly elevated sugar of 108 and a creatinine elevated but stable at 1.89 and otherwise was  normal.  Lipid panel was just a bit high with total cholesterol 221, HDL of 65 and .  TSH was quite normal.  Uric acid level is well controlled at 4.8.  INR done at home yesterday was therapeutic at 2.5.  #1-hypertension, controlled   #2-paroxysmal atrial fibrillation, regular rhythm today  #3-history of asthma and COPD, essentially asymptomatic currently  #4-hypothyroidism, euthyroid on replacement  #5-gout, asymptomatic with excellent control  #6-chronic kidney disease, asymptomatic and stable     Problem List Items Addressed This Visit        Cardiovascular and Mediastinum    Benign essential hypertension - Primary    PAF (paroxysmal atrial fibrillation) (CMS/HCC)       Respiratory    Asthma       Endocrine    Hypothyroidism       Nervous and Auditory    Fibromyalgia    Peripheral neuropathy       Musculoskeletal and Integument    Osteoarthritis of both hands       Genitourinary    Chronic renal impairment, stage 3 (moderate) (CMS/HCC)       Other    Gout with tophus          PLAN the patient received a Pneumovax 23 immunization today and will continue current medicines as now.  I will recheck her in 4 months with a CBC, CMP, lipid panel, TSH and free T4 and hepatitis C screen per protocol    There are no Patient Instructions on file for this visit.  Return in about 4 months (around 10/12/2019) for with labs.

## 2019-06-15 ENCOUNTER — APPOINTMENT (OUTPATIENT)
Dept: GENERAL RADIOLOGY | Facility: HOSPITAL | Age: 69
End: 2019-06-15

## 2019-06-15 ENCOUNTER — HOSPITAL ENCOUNTER (INPATIENT)
Facility: HOSPITAL | Age: 69
LOS: 2 days | Discharge: HOME | End: 2019-06-17
Attending: EMERGENCY MEDICINE | Admitting: INTERNAL MEDICINE

## 2019-06-15 DIAGNOSIS — A41.9 SEVERE SEPSIS (HCC): ICD-10-CM

## 2019-06-15 DIAGNOSIS — R65.20 SEVERE SEPSIS (HCC): ICD-10-CM

## 2019-06-15 DIAGNOSIS — Z74.09 IMPAIRED FUNCTIONAL MOBILITY, BALANCE, GAIT, AND ENDURANCE: ICD-10-CM

## 2019-06-15 DIAGNOSIS — N17.9 AKI (ACUTE KIDNEY INJURY) (HCC): ICD-10-CM

## 2019-06-15 DIAGNOSIS — R77.8 ELEVATED TROPONIN: ICD-10-CM

## 2019-06-15 DIAGNOSIS — R50.9 FEVER, UNSPECIFIED FEVER CAUSE: Primary | ICD-10-CM

## 2019-06-15 LAB
ALBUMIN SERPL-MCNC: 4.1 G/DL (ref 3.5–5.2)
ALBUMIN/GLOB SERPL: 1.2 G/DL
ALP SERPL-CCNC: 58 U/L (ref 39–117)
ALT SERPL W P-5'-P-CCNC: 12 U/L (ref 1–33)
ANION GAP SERPL CALCULATED.3IONS-SCNC: 15.9 MMOL/L
AST SERPL-CCNC: 25 U/L (ref 1–32)
BASOPHILS # BLD AUTO: 0.12 10*3/MM3 (ref 0–0.2)
BASOPHILS NFR BLD AUTO: 0.4 % (ref 0–1.5)
BILIRUB SERPL-MCNC: 0.9 MG/DL (ref 0.2–1.2)
BILIRUB UR QL STRIP: NEGATIVE
BUN BLD-MCNC: 29 MG/DL (ref 8–23)
BUN/CREAT SERPL: 10.6 (ref 7–25)
CALCIUM SPEC-SCNC: 9.6 MG/DL (ref 8.6–10.5)
CHLORIDE SERPL-SCNC: 97 MMOL/L (ref 98–107)
CLARITY UR: CLEAR
CO2 SERPL-SCNC: 22.1 MMOL/L (ref 22–29)
COLOR UR: YELLOW
CORTIS SERPL-MCNC: 8.64 MCG/DL
CREAT BLD-MCNC: 2.73 MG/DL (ref 0.57–1)
D-LACTATE SERPL-SCNC: 1.6 MMOL/L (ref 0.5–2)
DEPRECATED RDW RBC AUTO: 51.2 FL (ref 37–54)
EOSINOPHIL # BLD AUTO: 0.06 10*3/MM3 (ref 0–0.4)
EOSINOPHIL NFR BLD AUTO: 0.2 % (ref 0.3–6.2)
ERYTHROCYTE [DISTWIDTH] IN BLOOD BY AUTOMATED COUNT: 14.4 % (ref 12.3–15.4)
GFR SERPL CREATININE-BSD FRML MDRD: 17 ML/MIN/1.73
GLOBULIN UR ELPH-MCNC: 3.3 GM/DL
GLUCOSE BLD-MCNC: 95 MG/DL (ref 65–99)
GLUCOSE BLDC GLUCOMTR-MCNC: 79 MG/DL (ref 70–130)
GLUCOSE BLDC GLUCOMTR-MCNC: 83 MG/DL (ref 70–130)
GLUCOSE UR STRIP-MCNC: NEGATIVE MG/DL
HCT VFR BLD AUTO: 40.4 % (ref 34–46.6)
HGB BLD-MCNC: 12.5 G/DL (ref 12–15.9)
HGB UR QL STRIP.AUTO: NEGATIVE
IMM GRANULOCYTES # BLD AUTO: 0.48 10*3/MM3 (ref 0–0.05)
IMM GRANULOCYTES NFR BLD AUTO: 1.7 % (ref 0–0.5)
INR PPP: 1.45 (ref 0.9–1.1)
KETONES UR QL STRIP: NEGATIVE
LEUKOCYTE ESTERASE UR QL STRIP.AUTO: NEGATIVE
LYMPHOCYTES # BLD AUTO: 4.33 10*3/MM3 (ref 0.7–3.1)
LYMPHOCYTES NFR BLD AUTO: 15 % (ref 19.6–45.3)
MCH RBC QN AUTO: 30.3 PG (ref 26.6–33)
MCHC RBC AUTO-ENTMCNC: 30.9 G/DL (ref 31.5–35.7)
MCV RBC AUTO: 97.8 FL (ref 79–97)
MONOCYTES # BLD AUTO: 2.66 10*3/MM3 (ref 0.1–0.9)
MONOCYTES NFR BLD AUTO: 9.2 % (ref 5–12)
NEUTROPHILS # BLD AUTO: 21.25 10*3/MM3 (ref 1.7–7)
NEUTROPHILS NFR BLD AUTO: 73.5 % (ref 42.7–76)
NITRITE UR QL STRIP: NEGATIVE
NRBC BLD AUTO-RTO: 0.2 /100 WBC (ref 0–0.2)
PH UR STRIP.AUTO: 7.5 [PH] (ref 5–8)
PLATELET # BLD AUTO: 163 10*3/MM3 (ref 140–450)
PMV BLD AUTO: 11.1 FL (ref 6–12)
POTASSIUM BLD-SCNC: 4.6 MMOL/L (ref 3.5–5.2)
PROT SERPL-MCNC: 7.4 G/DL (ref 6–8.5)
PROT UR QL STRIP: NEGATIVE
PROTHROMBIN TIME: 17.4 SECONDS (ref 11.7–14.2)
RBC # BLD AUTO: 4.13 10*6/MM3 (ref 3.77–5.28)
SODIUM BLD-SCNC: 135 MMOL/L (ref 136–145)
SP GR UR STRIP: 1.02 (ref 1–1.03)
TROPONIN T SERPL-MCNC: 0.05 NG/ML (ref 0–0.03)
UROBILINOGEN UR QL STRIP: NORMAL
WBC NRBC COR # BLD: 28.9 10*3/MM3 (ref 3.4–10.8)

## 2019-06-15 PROCEDURE — 87040 BLOOD CULTURE FOR BACTERIA: CPT | Performed by: EMERGENCY MEDICINE

## 2019-06-15 PROCEDURE — 71046 X-RAY EXAM CHEST 2 VIEWS: CPT

## 2019-06-15 PROCEDURE — 83605 ASSAY OF LACTIC ACID: CPT | Performed by: EMERGENCY MEDICINE

## 2019-06-15 PROCEDURE — 82533 TOTAL CORTISOL: CPT | Performed by: EMERGENCY MEDICINE

## 2019-06-15 PROCEDURE — 99285 EMERGENCY DEPT VISIT HI MDM: CPT

## 2019-06-15 PROCEDURE — P9612 CATHETERIZE FOR URINE SPEC: HCPCS

## 2019-06-15 PROCEDURE — 82962 GLUCOSE BLOOD TEST: CPT

## 2019-06-15 PROCEDURE — 84484 ASSAY OF TROPONIN QUANT: CPT | Performed by: INTERNAL MEDICINE

## 2019-06-15 PROCEDURE — 93010 ELECTROCARDIOGRAM REPORT: CPT | Performed by: INTERNAL MEDICINE

## 2019-06-15 PROCEDURE — 25010000002 CEFTRIAXONE PER 250 MG: Performed by: EMERGENCY MEDICINE

## 2019-06-15 PROCEDURE — 81003 URINALYSIS AUTO W/O SCOPE: CPT | Performed by: EMERGENCY MEDICINE

## 2019-06-15 PROCEDURE — 93005 ELECTROCARDIOGRAM TRACING: CPT | Performed by: EMERGENCY MEDICINE

## 2019-06-15 PROCEDURE — 85025 COMPLETE CBC W/AUTO DIFF WBC: CPT | Performed by: EMERGENCY MEDICINE

## 2019-06-15 PROCEDURE — 80053 COMPREHEN METABOLIC PANEL: CPT | Performed by: EMERGENCY MEDICINE

## 2019-06-15 PROCEDURE — 85610 PROTHROMBIN TIME: CPT | Performed by: EMERGENCY MEDICINE

## 2019-06-15 PROCEDURE — 84484 ASSAY OF TROPONIN QUANT: CPT | Performed by: EMERGENCY MEDICINE

## 2019-06-15 RX ORDER — SODIUM CHLORIDE 9 MG/ML
125 INJECTION, SOLUTION INTRAVENOUS CONTINUOUS
Status: DISCONTINUED | OUTPATIENT
Start: 2019-06-15 | End: 2019-06-16

## 2019-06-15 RX ORDER — SODIUM CHLORIDE 0.9 % (FLUSH) 0.9 %
10 SYRINGE (ML) INJECTION AS NEEDED
Status: DISCONTINUED | OUTPATIENT
Start: 2019-06-15 | End: 2019-06-17 | Stop reason: HOSPADM

## 2019-06-15 RX ORDER — ACETAMINOPHEN 500 MG
1000 TABLET ORAL ONCE
Status: COMPLETED | OUTPATIENT
Start: 2019-06-15 | End: 2019-06-15

## 2019-06-15 RX ORDER — CEFTRIAXONE SODIUM 1 G/50ML
1 INJECTION, SOLUTION INTRAVENOUS ONCE
Status: COMPLETED | OUTPATIENT
Start: 2019-06-15 | End: 2019-06-15

## 2019-06-15 RX ORDER — NOREPINEPHRINE BIT/0.9 % NACL 8 MG/250ML
.02-.3 INFUSION BOTTLE (ML) INTRAVENOUS
Status: DISCONTINUED | OUTPATIENT
Start: 2019-06-15 | End: 2019-06-16

## 2019-06-15 RX ADMIN — NOREPINEPHRINE BITARTRATE 0.02 MCG/KG/MIN: 1 INJECTION INTRAVENOUS at 21:19

## 2019-06-15 RX ADMIN — ACETAMINOPHEN 1000 MG: 500 TABLET, FILM COATED ORAL at 17:06

## 2019-06-15 RX ADMIN — CEFTRIAXONE SODIUM 1 G: 1 INJECTION, SOLUTION INTRAVENOUS at 17:39

## 2019-06-15 RX ADMIN — SODIUM CHLORIDE 1000 ML: 9 INJECTION, SOLUTION INTRAVENOUS at 17:06

## 2019-06-15 RX ADMIN — SODIUM CHLORIDE 125 ML/HR: 9 INJECTION, SOLUTION INTRAVENOUS at 22:28

## 2019-06-15 RX ADMIN — SODIUM CHLORIDE 1000 ML: 9 INJECTION, SOLUTION INTRAVENOUS at 19:32

## 2019-06-15 RX ADMIN — SODIUM CHLORIDE 1000 ML: 9 INJECTION, SOLUTION INTRAVENOUS at 18:50

## 2019-06-15 NOTE — ED PROVIDER NOTES
EMERGENCY DEPARTMENT ENCOUNTER    Room Number:  09/09  Date seen:  6/15/2019  Time seen: 4:56 PM  PCP: Nathan Shipman MD  Historian: Pt      HPI:  Chief Complaint: Fever  A complete HPI/ROS/PMH/PSH/SH/FH are unobtainable due to: none  Context: Alison Colvin is a 68 y.o. female who presents to the ED c/o fever that began three days ago (102.9F upon arrival). Pt states she was given her 2nd pneumonia shot a few days ago. Pt states she has had a bad reaction in the past from a pneumonia shot.  Pt reports Hx of COPD and is on 2L at all times. Pt denies Hx of DM or CHF. Hx of kidney failure. Not on dialysis. Report decreased urinary output. Pt denies abd pain or chest pain.    Pain Location: n/a  Radiation: none  Quality: fever  Intensity/Severity: moderate  Duration: few days  Onset quality: gradual  Timing: constant  Progression: unchanged  Aggravating Factors: none  Alleviating Factors: none  Associated Symptoms: decreased urinary output          PAST MEDICAL HISTORY  Active Ambulatory Problems     Diagnosis Date Noted   • Anemia of chronic disorder 02/18/2016   • Asthma 02/18/2016   • Benign essential hypertension 02/18/2016   • Chronic venous insufficiency 02/18/2016   • Depression 02/18/2016   • Degeneration of intervertebral disc of lumbosacral region 02/18/2016   • Duodenal ulcer 02/18/2016   • Gastroesophageal reflux disease 02/18/2016   • Fibromyalgia 02/18/2016   • Gastric ulcer 02/18/2016   • Gastroparesis 02/18/2016   • Hypothyroidism 02/18/2016   • Migraine 02/18/2016   • Peripheral neuropathy 02/18/2016   • Restrictive lung disease 02/18/2016   • Upper gastrointestinal hemorrhage 02/18/2016   • Urinary incontinence 02/18/2016   • Osteoarthritis of both hands 02/26/2016   • Gout with tophus 07/19/2016   • PAF (paroxysmal atrial fibrillation) (CMS/HCC) 07/19/2016   • History of pulmonary embolism 07/28/2016   • History of deep vein thrombophlebitis of lower extremity 07/28/2016   • History of  inferior vena caval filter placement 07/28/2016   • 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   • Chronic intermittent hypoxia with obstructive sleep apnea 08/09/2016   • Chronic renal impairment, stage 3 (moderate) (CMS/HCC) 07/11/2017   • Nocturnal hypoxia 08/23/2018   • Obstructive sleep apnea syndrome 08/23/2018     Resolved Ambulatory Problems     Diagnosis Date Noted   • Chronic uremia 02/18/2016   • Atypical migraine 02/18/2016   • Community acquired pneumonia 02/18/2016   • Dysphagia 02/18/2016   • Metabolic encephalopathy 02/18/2016   • Sleep apnea 02/18/2016   • Intractable migraine 02/18/2016   • Leg edema, right 02/18/2016   • Cellulitis of right lower extremity 02/26/2016   • Toxic metabolic encephalopathy 05/27/2016   • Chronic renal impairment, stage 2 (mild) 07/19/2016   • Acute esophagitis 07/19/2016   • Leg pain, right 07/27/2016   • Thrombocytopenia (CMS/HCC) 07/27/2016   • Deep vein thrombophlebitis of leg (CMS/HCC) 07/28/2016   • Cellulitis of right lower extremity 11/15/2016   • Chronic renal impairment, stage 2 (mild) 11/15/2016   • Obstructive sleep apnea syndrome 07/17/2018     Past Medical History:   Diagnosis Date   • Abnormal nuclear stress test    • Acute sinusitis    • Allergy    • Benign essential hypertension    • Cellulitis    • Chest pain    • Colon cancer (CMS/HCC)    • COPD (chronic obstructive pulmonary disease) (CMS/HCC)    • Disease of thyroid gland    • Edema of leg    • Epilepsy (CMS/HCC)    • Esophageal reflux    • Foot pain    • Myalgia and myositis    • Onychomycosis of toenail    • Transient cerebral ischemia    • URI (upper respiratory infection)          PAST SURGICAL HISTORY  Past Surgical History:   Procedure Laterality Date   • CATARACT EXTRACTION     • COLON SURGERY     • COLONOSCOPY     • ENDOSCOPY N/A 6/17/2016    Procedure: ESOPHAGOGASTRODUODENOSCOPY with biopsy and viral culture;  Surgeon: Presley Cornelius MD;  Location: [facility] ENDOSCOPY;  Service:     • HIP SURGERY     • HYSTERECTOMY           FAMILY HISTORY  Family History   Problem Relation Age of Onset   • Cancer Mother         colon; kidneys   • Other Father         cardiac disorder   • Cancer Brother         colon         SOCIAL HISTORY  Social History     Socioeconomic History   • Marital status:      Spouse name: Not on file   • Number of children: Not on file   • Years of education: Not on file   • Highest education level: Not on file   Tobacco Use   • Smoking status: Never Smoker   • Smokeless tobacco: Never Used   Substance and Sexual Activity   • Alcohol use: No   • Drug use: No         ALLERGIES  Pneumococcal vaccines; Asa [aspirin]; Levofloxacin; Meperidine; Naproxen; Pregabalin; Propoxyphene; and Sulfa antibiotics        REVIEW OF SYSTEMS  Review of Systems   Constitutional: Positive for fever.   HENT: Negative for sore throat.    Eyes: Negative.    Respiratory: Negative for cough and shortness of breath.    Cardiovascular: Negative for chest pain.   Gastrointestinal: Negative for abdominal pain, diarrhea and vomiting.   Genitourinary: Positive for decreased urine volume. Negative for dysuria.   Musculoskeletal: Negative for neck pain.   Skin: Negative for rash.   Allergic/Immunologic: Negative.    Neurological: Negative for weakness, numbness and headaches.   Hematological: Negative.    Psychiatric/Behavioral: Negative.    All other systems reviewed and are negative.           PHYSICAL EXAM  ED Triage Vitals [06/15/19 1623]   Temp Heart Rate Resp BP SpO2   (!) 102.9 °F (39.4 °C) 112 16 -- 95 %      Temp src Heart Rate Source Patient Position BP Location FiO2 (%)   Tympanic Monitor -- -- --         GENERAL: Appears ill appeaing. Non-toxic. Appears generally weak.   HENT: nares patent, dry mucous membranes  EYES: no scleral icterus  CV: regular rhythm, tachycardic  RESPIRATORY: normal effort, lungs CTAB  ABDOMEN: soft, non-tender  MUSCULOSKELETAL: no deformity, no BLE edema. approximately 1  cm of subq induration w/o overlying erythema or warmth to right deltoid.   NEURO: alert, moves all extremities, follows commands  SKIN: warm, dry    Vital signs and nursing notes reviewed.        LAB RESULTS  Recent Results (from the past 24 hour(s))   Comprehensive Metabolic Panel    Collection Time: 06/15/19  4:58 PM   Result Value Ref Range    Glucose 95 65 - 99 mg/dL    BUN 29 (H) 8 - 23 mg/dL    Creatinine 2.73 (H) 0.57 - 1.00 mg/dL    Sodium 135 (L) 136 - 145 mmol/L    Potassium 4.6 3.5 - 5.2 mmol/L    Chloride 97 (L) 98 - 107 mmol/L    CO2 22.1 22.0 - 29.0 mmol/L    Calcium 9.6 8.6 - 10.5 mg/dL    Total Protein 7.4 6.0 - 8.5 g/dL    Albumin 4.10 3.50 - 5.20 g/dL    ALT (SGPT) 12 1 - 33 U/L    AST (SGOT) 25 1 - 32 U/L    Alkaline Phosphatase 58 39 - 117 U/L    Total Bilirubin 0.9 0.2 - 1.2 mg/dL    eGFR Non African Amer 17 (L) >60 mL/min/1.73    Globulin 3.3 gm/dL    A/G Ratio 1.2 g/dL    BUN/Creatinine Ratio 10.6 7.0 - 25.0    Anion Gap 15.9 mmol/L   Troponin    Collection Time: 06/15/19  4:58 PM   Result Value Ref Range    Troponin T 0.048 (C) 0.000 - 0.030 ng/mL   Lactic Acid, Plasma    Collection Time: 06/15/19  4:58 PM   Result Value Ref Range    Lactate 1.6 0.5 - 2.0 mmol/L   Protime-INR    Collection Time: 06/15/19  4:58 PM   Result Value Ref Range    Protime 17.4 (H) 11.7 - 14.2 Seconds    INR 1.45 (H) 0.90 - 1.10   CBC Auto Differential    Collection Time: 06/15/19  4:58 PM   Result Value Ref Range    WBC 28.90 (H) 3.40 - 10.80 10*3/mm3    RBC 4.13 3.77 - 5.28 10*6/mm3    Hemoglobin 12.5 12.0 - 15.9 g/dL    Hematocrit 40.4 34.0 - 46.6 %    MCV 97.8 (H) 79.0 - 97.0 fL    MCH 30.3 26.6 - 33.0 pg    MCHC 30.9 (L) 31.5 - 35.7 g/dL    RDW 14.4 12.3 - 15.4 %    RDW-SD 51.2 37.0 - 54.0 fl    MPV 11.1 6.0 - 12.0 fL    Platelets 163 140 - 450 10*3/mm3    Neutrophil % 73.5 42.7 - 76.0 %    Lymphocyte % 15.0 (L) 19.6 - 45.3 %    Monocyte % 9.2 5.0 - 12.0 %    Eosinophil % 0.2 (L) 0.3 - 6.2 %    Basophil % 0.4  0.0 - 1.5 %    Immature Grans % 1.7 (H) 0.0 - 0.5 %    Neutrophils, Absolute 21.25 (H) 1.70 - 7.00 10*3/mm3    Lymphocytes, Absolute 4.33 (H) 0.70 - 3.10 10*3/mm3    Monocytes, Absolute 2.66 (H) 0.10 - 0.90 10*3/mm3    Eosinophils, Absolute 0.06 0.00 - 0.40 10*3/mm3    Basophils, Absolute 0.12 0.00 - 0.20 10*3/mm3    Immature Grans, Absolute 0.48 (H) 0.00 - 0.05 10*3/mm3    nRBC 0.2 0.0 - 0.2 /100 WBC   Urinalysis With Microscopic If Indicated (No Culture) - Urine, Catheter    Collection Time: 06/15/19  5:39 PM   Result Value Ref Range    Color, UA Yellow Yellow, Straw    Appearance, UA Clear Clear    pH, UA 7.5 5.0 - 8.0    Specific Gravity, UA 1.018 1.005 - 1.030    Glucose, UA Negative Negative    Ketones, UA Negative Negative    Bilirubin, UA Negative Negative    Blood, UA Negative Negative    Protein, UA Negative Negative    Leuk Esterase, UA Negative Negative    Nitrite, UA Negative Negative    Urobilinogen, UA 1.0 E.U./dL 0.2 - 1.0 E.U./dL       Ordered the above labs and reviewed the results.        RADIOLOGY  Xr Chest 2 View    Result Date: 6/15/2019  EMERGENCY PA AND LATERAL CHEST X-RAY 06/15/2019  CLINICAL HISTORY: Fever, history of hypertension, COPD.  This is correlated to prior chest CT 06/03/2016 and chest x-ray 06/02/2016.  FINDINGS: The cardiomediastinal silhouette and the pulmonary vasculature are within normal limits. The lungs are clear. The costophrenic angles are sharp.      No active disease is seen in the chest.        CXR negative acute  Ordered the above noted radiological studies. Reviewed by me in PACS.            PROCEDURES  Procedures        EKG:           EKG time: 1705  Rhythm/Rate: sinus tach, 101  P waves and [place]: nml  QRS, axis: borderline LAD   ST and T waves: slight ST depression laterally    Interpreted Contemporaneously by me, independently viewed  ST changes new compared to prior 6/12/16              MEDICATIONS GIVEN IN ER  Medications   sodium chloride 0.9 % flush 10 mL (not  administered)   sodium chloride 0.9 % bolus 1,000 mL (1,000 mL Intravenous New Bag 6/15/19 1850)   sodium chloride 0.9 % bolus 1,000 mL (1,000 mL Intravenous New Bag 6/15/19 1932)   norepinephrine (LEVOPHED) 8 mg/250 mL (32 mcg/mL) in sodium chloride 0.9% infusion (premix) (not administered)   acetaminophen (TYLENOL) tablet 1,000 mg (1,000 mg Oral Given 6/15/19 1706)   sodium chloride 0.9 % bolus 1,000 mL (0 mL Intravenous Stopped 6/15/19 1838)   cefTRIAXone (ROCEPHIN) IVPB 1 g (0 g Intravenous Stopped 6/15/19 1848)                   PROGRESS AND CONSULTS     1659  BP = 81/54; hr = 104    1702  Ordered IV Rocephin for empirical treatment.     1831  Ordered another L of IVF's for severe sepsis.    1922  Ordered another L of IVF's    1923  Discussed the pt's case with Dr. White. After further discussion he agrees that the pt needs to go to the ICU.    1926  Rechecked pt. BP = 62/31. Manual BP is high 80s/50. Notified pt of lab work results, including a CBC that shows an elevated WBC of 29k and CMP showing worsening BUN and creatinine. Informed pt concern for sepsis with unknown origin. Pt denies abd pain. Discussed the plan to admit the pt to ICU. Pt understands and agrees with the plan, all questions answered.    1944  Discussed the pt's case with Dr. Encinas, Pulmonology who agrees to admit the pt to the ICU.       SEPTIC SHOCK FOCUSED EXAM ATTESTATION    I attest that I have reassessed tissue perfusion after the fluid bolus given.    Dr. Mohr  06/15/19  7:46 PM      MEDICAL DECISION MAKING    68-year-old female presents with fever and hypotension after recent pneumococcal vaccination.  She has reportedly had an adverse effect from pneumococcal vaccination in the past.  She was given Tylenol for her fever.  She was given IV fluids.  She reports some history of renal insufficiency however also has acute renal failure today.  Cardiac troponin is mildly elevated with slight lateral ST depressions but no ST elevation.   I suspect this is related to demand ischemia rather than coronary lesion.  Her lactate is normal.  She was initially fluid responsive however her blood pressure then began to fall requiring further fluid boluses and ultimately Levophed drip.  Chest x-ray is clear.  Urinalysis is without evidence of UTI.  She has no abdominal pain nor tenderness on examination.  Source of her infection is unclear at this time.  She was given empiric Rocephin IV.  I considered vancomycin but ultimately decided not to administer this due to her acute renal failure and risk for further kidney injury.  She has no history of MRSA.    She will be admitted to the ICU for ongoing care.    MDM  Number of Diagnoses or Management Options  CARRIE (acute kidney injury) (CMS/HCC):   Elevated troponin:   Fever, unspecified fever cause:   Severe sepsis (CMS/HCC):      Amount and/or Complexity of Data Reviewed  Clinical lab tests: ordered and reviewed (troponin=0.048; CBC: WBC=28.9; CMP: BUN = 29, creatinine=2.73; UA negative)  Tests in the radiology section of CPT®: ordered and reviewed (CXR is negative acute)  Tests in the medicine section of CPT®: ordered and reviewed (See EKG results in procedure. )  Decide to obtain previous medical records or to obtain history from someone other than the patient: yes  Discuss the patient with other providers: yes (Dr. Encinas, Pulmonology; Dr. White, [facility])  Independent visualization of images, tracings, or specimens: yes               DIAGNOSIS  Final diagnoses:   Fever, unspecified fever cause   CARRIE (acute kidney injury) (CMS/HCC)   Severe sepsis (CMS/[facility])   Elevated troponin         DISPOSITION  ADMISSION    Discussed treatment plan and reason for admission with pt/family and admitting physician.  Pt/family voiced understanding of the plan for admission for further testing/treatment as needed.                 Latest Documented Vital Signs:  As of 7:46 PM  BP- (!) 85/50 HR- 103 Temp- 100.1 °F (37.8 °C) (Oral) O2  sat- 99%        --  Documentation assistance provided by bharat Mccray for Dr. WILMER Mohr MD.  Information recorded by the bharat was done at my direction and has been verified and validated by me.      Please note that portions of this were completed with a voice recognition program.                  Duc Mccray  06/15/19 1946       Parag Mohr MD  06/15/19 2820

## 2019-06-15 NOTE — H&P
[facility]  Phone: [phone]  Kevin Encinas MD      Subjective   LOS: 0 days     68-year-old female who had pneumonia vaccination on Thursday.  About 24 hours later she started developing a lot of pain in her upper arm.  Thereafter she developed increasing weakness and came into the emergency room.  She was found to be hypotensive.  She eventually needed to be started on pressors.  She was started on Levophed.  Though no evidence of infection she was given a dose of ceftriaxone.  Patient states that she had similar adverse reaction to a pneumococcal vaccination 20 or so years ago.  However she is taking the Pneumovax without a problem but the most recent vaccination was the 23 Valent vaccination.  She has a recorded diagnosis of COPD though she is a never smoker.  She states that her sleep apnea was retested and has resolved.  I find this hard to believe.  She was previously admitted at this institution and had A. fib.  However patient states this has resolved as well and she is not on medications for the same.  She clearly is on warfarin and on her home medication list.  She really only takes medications for hypertension and allergies.  She does have some chronic kidney disease.  She is otherwise in fairly remarkable health.    Alison NATALIIA Colvin  reports that she does not drink alcohol.,  reports that she has never smoked. She has never used smokeless tobacco.     Past Hx:  has a past medical history of Abnormal nuclear stress test, Acute sinusitis, Allergy, Benign essential hypertension, Cellulitis, Chest pain, Colon cancer (CMS/HCC), COPD (chronic obstructive pulmonary disease) (CMS/HCC), Disease of thyroid gland, Edema of leg, Epilepsy (CMS/HCC), Esophageal reflux, Foot pain, Myalgia and myositis, Onychomycosis of toenail, Transient cerebral ischemia, and URI (upper respiratory infection).  Surg Hx:  has a past surgical history that includes Cataract extraction; Hip surgery; Hysterectomy;  Colonoscopy; Esophagogastroduodenoscopy (N/A, 6/17/2016); and Colon surgery.  FH: family history includes Cancer in her brother and mother; Other in her father.  SH:  reports that she has never smoked. She has never used smokeless tobacco. She reports that she does not drink alcohol or use drugs.    Medications Prior to Admission   Medication Sig Dispense Refill Last Dose   • allopurinol (ZYLOPRIM) 300 MG tablet Take 1 tablet by mouth Daily. 90 tablet 3    • baclofen (LIORESAL) 10 MG tablet Take 1 tablet by mouth 2 (Two) Times a Day. 180 tablet 1    • calcium carbonate (OS-SEBASTIÁN) 600 MG tablet Take 600 mg by mouth 2 (two) times a day with meals.      • furosemide (LASIX) 40 MG tablet Take 1 tablet by mouth Daily. 90 tablet 3    • HYDROcodone-acetaminophen (NORCO)  MG per tablet TK 1 T PO Q 8 H PRF PAIN  0 Taking   • levothyroxine (SYNTHROID, LEVOTHROID) 50 MCG tablet Take 1 tablet by mouth Daily. 90 tablet 3    • lisinopril (PRINIVIL,ZESTRIL) 5 MG tablet Take 1 tablet by mouth Daily. 90 tablet 3    • metoclopramide (REGLAN) 10 MG tablet Take 1 tablet by mouth 4 (Four) Times a Day. 360 tablet 3    • Multiple Vitamins-Minerals (WOMENS 50+ MULTI VITAMIN/MIN PO) Take 1 tablet by mouth daily.   Taking   • OXYGEN-HELIUM IN Inhale.      • pantoprazole (PROTONIX) 40 MG EC tablet Take 1 tablet by mouth 2 (Two) Times a Day. 180 tablet 3    • potassium chloride (K-DUR,KLOR-CON) 20 MEQ CR tablet Take 1 tablet by mouth 2 (Two) Times a Day. 180 tablet 3    • predniSONE (DELTASONE) 5 MG tablet Take 1 tablet by mouth Daily. 90 tablet 1    • tiZANidine (ZANAFLEX) 4 MG tablet Take 1 tablet by mouth 2 (Two) Times a Day. 180 tablet 1    • topiramate (TOPAMAX) 25 MG tablet Take 1 tablet by mouth 2 (Two) Times a Day. 180 tablet 3    • warfarin (COUMADIN) 5 MG tablet Alternate 5 mg with 2.5 mg every other day 90 tablet 3    • BROVANA 15 MCG/2ML nebulizer solution USE 1 VIAL PER NEBULIZER BID  5 Unknown at Unknown time   • budesonide  (PULMICORT) 1 MG/2ML nebulizer solution Pulmicort SUSP; Patient Sig: Pulmicort SUSP USE 1 UNIT DOSE VIA NEBULIZER TWICE DAILY; 0; 25-Mar-2013; Active   Unknown at Unknown time   • conjugated estrogens (PREMARIN) vaginal cream Insert 0.625 g into the vagina as needed (vaginal dryness).   Unknown at Unknown time   • DULoxetine (CYMBALTA) 60 MG capsule Take 1 capsule by mouth Daily. 90 capsule 3 Unknown at Unknown time   • fluticasone (FLONASE) 50 MCG/ACT nasal spray into each nostril As Needed.   Unknown at Unknown time   • ipratropium-albuterol (DUO-NEB) 0.5-2.5 mg/mL nebulizer USE 1 VIAL PER NEBULIZER QID  5 Unknown at Unknown time   • nystatin (MYCOSTATIN) 947383 UNIT/ML suspension Swish and swallow 5 mL 4 (four) times a day. 240 mL 0 Unknown at Unknown time     Allergies   Allergen Reactions   • Pneumococcal Vaccines Delirium     Fever, chills   • Asa [Aspirin]    • Levofloxacin    • Meperidine Other (See Comments)   • Naproxen    • Pregabalin Other (See Comments)   • Propoxyphene    • Sulfa Antibiotics Other (See Comments)       Review of Systems   Constitutional: Positive for fatigue. Negative for chills and fever.   HENT: Positive for congestion. Negative for postnasal drip and trouble swallowing.    Respiratory: Negative for cough, shortness of breath and wheezing.    Cardiovascular: Negative for chest pain and leg swelling.   Gastrointestinal: Positive for nausea. Negative for abdominal pain, diarrhea and vomiting.   Endocrine: Negative for cold intolerance and heat intolerance.   Genitourinary: Negative for frequency and urgency.   Musculoskeletal: Negative for arthralgias and back pain.   Skin: Negative for pallor and rash.   Neurological: Positive for weakness (generalized). Negative for seizures and headaches.   Psychiatric/Behavioral: Negative for confusion. The patient is not nervous/anxious.      Vital Signs past 24hrs  BP range: BP: ()/(47-79) 85/50  Pulse range: Heart Rate:  [] 103  Resp  rate range: Resp:  [15-16] 16  Temp range: Temp (24hrs), Av.9 °F (38.3 °C), Min:99.7 °F (37.6 °C), Max:102.9 °F (39.4 °C)    Oxygen range: SpO2:  [92 %-99 %] 99 %;  ;   Device (Oxygen Therapy): room air  106 kg (233 lb); Body mass index is 42.62 kg/m².  No intake/output data recorded.    Adult female laying in bed no acute distress.  Pupils equal and reactive light.  Oropharynx moist with a class IV Mallampati airway.  No posterior pharyngeal discharge.  Nasopharynx without discharge septum midline.  JVP not elevated trachea midline thyroid not enlarged.  Lungs reveal bilateral air entry somewhat diminished but equal.  No wheezing.  Percussion not resonant chest expansion equal no chest wall deformities or tenderness.  Heart examination S1-S2 present rhythm regular no murmurs.  No edema lower extremities.  She moves all 4 extremities sensorimotor intact.  She has induration in the right upper arm.  I do not really see any erythema in the upper arm.  Abdomen is obese soft nontender bowel sounds present no liver spleen enlargement.  No peripheral cyanosis or clubbing.  No cervical, axillary, inguinal adenopathy noted.    Results Review:    I have reviewed the laboratory and imaging data from current admission. My annotations are as noted in assessment and plan.    Medication Review:  I have reviewed the current MAR. My annotations are as noted in assessment and plan.    Plan   PCCM Problems  Adverse reaction to pneumococcal 23 Valent vaccination  Relevant Medical Diagnoses  A. fib on anticoagulation  KATHRIN, no longer on CPAP  Never smoker, unlikely COPD  Obesity  CKD  Chronic prednisone 5 mg (supposedly for asthma)    Plan of Treatment  Patient has responded well to pressors.  She should likely be switched to epinephrine and I have added this medicine.  However if she can be tapered off the Levophed fairly promptly I have no objections to same.    She does not report any itching etc. and I doubt if giving her  Benadryl or similar agent would be of any help.    Patient is supposedly on medications for A. fib even though she denies.  Her INR is not therapeutic.    Levo drip for now, change to epi - currently off. Continue fluids.    Home medications will be resumed.    Monitor renal function.    Patient is on prednisone chronically at 5 mg.  Patient states she takes it for asthma.  We will continue for now but this patient should probably taper off the prednisone and not to take long-term.    In view of her chronic prednisone intake I wonder if her hypertension and fatigue was related to adrenal insufficiency.    I spent +35 mins critical care time in care of this patient outside of any procedures.     Part of this note may be an electronic transcription/translation of spoken language to printed text using the Dragon Dictation System.

## 2019-06-15 NOTE — ED TRIAGE NOTES
Pt arrvied via [facility] ems with complaint of a fever 2 days after receiving the pnuemonia shot. Pt appears lethargic, but reports taking her norco 2 hours ago. Pts Temp is 102.9. Ems reports giving her 250ML NS and 4MG zofran in route.

## 2019-06-16 LAB
ANION GAP SERPL CALCULATED.3IONS-SCNC: 14 MMOL/L
B PARAPERT DNA SPEC QL NAA+PROBE: NOT DETECTED
B PERT DNA SPEC QL NAA+PROBE: NOT DETECTED
BUN BLD-MCNC: 26 MG/DL (ref 8–23)
BUN/CREAT SERPL: 10.9 (ref 7–25)
C PNEUM DNA NPH QL NAA+NON-PROBE: NOT DETECTED
CALCIUM SPEC-SCNC: 8.8 MG/DL (ref 8.6–10.5)
CHLORIDE SERPL-SCNC: 101 MMOL/L (ref 98–107)
CO2 SERPL-SCNC: 20 MMOL/L (ref 22–29)
CREAT BLD-MCNC: 2.38 MG/DL (ref 0.57–1)
DEPRECATED RDW RBC AUTO: 52.8 FL (ref 37–54)
ERYTHROCYTE [DISTWIDTH] IN BLOOD BY AUTOMATED COUNT: 14.6 % (ref 12.3–15.4)
FERRITIN SERPL-MCNC: 283 NG/ML (ref 13–150)
FLUAV H1 2009 PAND RNA NPH QL NAA+PROBE: NOT DETECTED
FLUAV H1 HA GENE NPH QL NAA+PROBE: NOT DETECTED
FLUAV H3 RNA NPH QL NAA+PROBE: NOT DETECTED
FLUAV SUBTYP SPEC NAA+PROBE: NOT DETECTED
FLUBV RNA ISLT QL NAA+PROBE: NOT DETECTED
GFR SERPL CREATININE-BSD FRML MDRD: 20 ML/MIN/1.73
GLUCOSE BLD-MCNC: 70 MG/DL (ref 65–99)
GLUCOSE BLDC GLUCOMTR-MCNC: 101 MG/DL (ref 70–130)
GLUCOSE BLDC GLUCOMTR-MCNC: 70 MG/DL (ref 70–130)
GLUCOSE BLDC GLUCOMTR-MCNC: 80 MG/DL (ref 70–130)
GLUCOSE BLDC GLUCOMTR-MCNC: 88 MG/DL (ref 70–130)
GLUCOSE BLDC GLUCOMTR-MCNC: 95 MG/DL (ref 70–130)
HADV DNA SPEC NAA+PROBE: NOT DETECTED
HCOV 229E RNA SPEC QL NAA+PROBE: NOT DETECTED
HCOV HKU1 RNA SPEC QL NAA+PROBE: NOT DETECTED
HCOV NL63 RNA SPEC QL NAA+PROBE: NOT DETECTED
HCOV OC43 RNA SPEC QL NAA+PROBE: NOT DETECTED
HCT VFR BLD AUTO: 35.3 % (ref 34–46.6)
HGB BLD-MCNC: 10.8 G/DL (ref 12–15.9)
HMPV RNA NPH QL NAA+NON-PROBE: NOT DETECTED
HPIV1 RNA SPEC QL NAA+PROBE: NOT DETECTED
HPIV2 RNA SPEC QL NAA+PROBE: NOT DETECTED
HPIV3 RNA NPH QL NAA+PROBE: NOT DETECTED
HPIV4 P GENE NPH QL NAA+PROBE: NOT DETECTED
INR PPP: 1.55 (ref 0.9–1.1)
IRON 24H UR-MRATE: 17 MCG/DL (ref 37–145)
IRON SATN MFR SERPL: 6 % (ref 20–50)
M PNEUMO IGG SER IA-ACNC: NOT DETECTED
MCH RBC QN AUTO: 30.6 PG (ref 26.6–33)
MCHC RBC AUTO-ENTMCNC: 30.6 G/DL (ref 31.5–35.7)
MCV RBC AUTO: 100 FL (ref 79–97)
PLATELET # BLD AUTO: 136 10*3/MM3 (ref 140–450)
PMV BLD AUTO: 11.3 FL (ref 6–12)
POTASSIUM BLD-SCNC: 4.5 MMOL/L (ref 3.5–5.2)
PROTHROMBIN TIME: 18.2 SECONDS (ref 11.7–14.2)
RBC # BLD AUTO: 3.53 10*6/MM3 (ref 3.77–5.28)
RHINOVIRUS RNA SPEC NAA+PROBE: NOT DETECTED
RSV RNA NPH QL NAA+NON-PROBE: NOT DETECTED
SODIUM BLD-SCNC: 135 MMOL/L (ref 136–145)
TIBC SERPL-MCNC: 277 MCG/DL (ref 298–536)
TRANSFERRIN SERPL-MCNC: 186 MG/DL (ref 200–360)
TROPONIN T SERPL-MCNC: 0.04 NG/ML (ref 0–0.03)
TROPONIN T SERPL-MCNC: 0.04 NG/ML (ref 0–0.03)
WBC NRBC COR # BLD: 23.41 10*3/MM3 (ref 3.4–10.8)

## 2019-06-16 PROCEDURE — 83540 ASSAY OF IRON: CPT | Performed by: INTERNAL MEDICINE

## 2019-06-16 PROCEDURE — 80048 BASIC METABOLIC PNL TOTAL CA: CPT | Performed by: INTERNAL MEDICINE

## 2019-06-16 PROCEDURE — 84466 ASSAY OF TRANSFERRIN: CPT | Performed by: INTERNAL MEDICINE

## 2019-06-16 PROCEDURE — 87040 BLOOD CULTURE FOR BACTERIA: CPT | Performed by: INTERNAL MEDICINE

## 2019-06-16 PROCEDURE — 82962 GLUCOSE BLOOD TEST: CPT

## 2019-06-16 PROCEDURE — 25010000002 HYDROCORTISONE SODIUM SUCCINATE 100 MG RECONSTITUTED SOLUTION: Performed by: INTERNAL MEDICINE

## 2019-06-16 PROCEDURE — 94799 UNLISTED PULMONARY SVC/PX: CPT

## 2019-06-16 PROCEDURE — 82728 ASSAY OF FERRITIN: CPT | Performed by: INTERNAL MEDICINE

## 2019-06-16 PROCEDURE — 87486 CHLMYD PNEUM DNA AMP PROBE: CPT | Performed by: INTERNAL MEDICINE

## 2019-06-16 PROCEDURE — 87581 M.PNEUMON DNA AMP PROBE: CPT | Performed by: INTERNAL MEDICINE

## 2019-06-16 PROCEDURE — 85027 COMPLETE CBC AUTOMATED: CPT | Performed by: INTERNAL MEDICINE

## 2019-06-16 PROCEDURE — 63710000001 PREDNISONE PER 5 MG: Performed by: INTERNAL MEDICINE

## 2019-06-16 PROCEDURE — 82784 ASSAY IGA/IGD/IGG/IGM EACH: CPT | Performed by: INTERNAL MEDICINE

## 2019-06-16 PROCEDURE — 87798 DETECT AGENT NOS DNA AMP: CPT | Performed by: INTERNAL MEDICINE

## 2019-06-16 PROCEDURE — 84484 ASSAY OF TROPONIN QUANT: CPT | Performed by: INTERNAL MEDICINE

## 2019-06-16 PROCEDURE — 94640 AIRWAY INHALATION TREATMENT: CPT

## 2019-06-16 PROCEDURE — 85610 PROTHROMBIN TIME: CPT | Performed by: INTERNAL MEDICINE

## 2019-06-16 PROCEDURE — 87633 RESP VIRUS 12-25 TARGETS: CPT | Performed by: INTERNAL MEDICINE

## 2019-06-16 PROCEDURE — 86334 IMMUNOFIX E-PHORESIS SERUM: CPT | Performed by: INTERNAL MEDICINE

## 2019-06-16 RX ORDER — IPRATROPIUM BROMIDE AND ALBUTEROL SULFATE 2.5; .5 MG/3ML; MG/3ML
3 SOLUTION RESPIRATORY (INHALATION) EVERY 6 HOURS PRN
Status: DISCONTINUED | OUTPATIENT
Start: 2019-06-16 | End: 2019-06-17 | Stop reason: HOSPADM

## 2019-06-16 RX ORDER — ARFORMOTEROL TARTRATE 15 UG/2ML
15 SOLUTION RESPIRATORY (INHALATION)
Status: DISCONTINUED | OUTPATIENT
Start: 2019-06-16 | End: 2019-06-17 | Stop reason: HOSPADM

## 2019-06-16 RX ORDER — PANTOPRAZOLE SODIUM 40 MG/1
40 TABLET, DELAYED RELEASE ORAL
Status: DISCONTINUED | OUTPATIENT
Start: 2019-06-16 | End: 2019-06-17 | Stop reason: HOSPADM

## 2019-06-16 RX ORDER — ALLOPURINOL 100 MG/1
100 TABLET ORAL DAILY
Status: DISCONTINUED | OUTPATIENT
Start: 2019-06-17 | End: 2019-06-17 | Stop reason: HOSPADM

## 2019-06-16 RX ORDER — TIZANIDINE 4 MG/1
4 TABLET ORAL 2 TIMES DAILY
Status: DISCONTINUED | OUTPATIENT
Start: 2019-06-16 | End: 2019-06-16

## 2019-06-16 RX ORDER — METOCLOPRAMIDE 5 MG/1
5 TABLET ORAL 4 TIMES DAILY
Status: DISCONTINUED | OUTPATIENT
Start: 2019-06-16 | End: 2019-06-17 | Stop reason: HOSPADM

## 2019-06-16 RX ORDER — HYDROCODONE BITARTRATE AND ACETAMINOPHEN 10; 325 MG/1; MG/1
1 TABLET ORAL EVERY 8 HOURS PRN
Status: DISCONTINUED | OUTPATIENT
Start: 2019-06-16 | End: 2019-06-16

## 2019-06-16 RX ORDER — DULOXETIN HYDROCHLORIDE 60 MG/1
60 CAPSULE, DELAYED RELEASE ORAL DAILY
Status: DISCONTINUED | OUTPATIENT
Start: 2019-06-16 | End: 2019-06-17 | Stop reason: HOSPADM

## 2019-06-16 RX ORDER — WARFARIN SODIUM 4 MG/1
4 TABLET ORAL
Status: DISCONTINUED | OUTPATIENT
Start: 2019-06-16 | End: 2019-06-17

## 2019-06-16 RX ORDER — BACLOFEN 10 MG/1
10 TABLET ORAL 2 TIMES DAILY
Status: DISCONTINUED | OUTPATIENT
Start: 2019-06-16 | End: 2019-06-16

## 2019-06-16 RX ORDER — TOPIRAMATE 25 MG/1
25 TABLET ORAL 2 TIMES DAILY
Status: DISCONTINUED | OUTPATIENT
Start: 2019-06-16 | End: 2019-06-16

## 2019-06-16 RX ORDER — PREDNISONE 1 MG/1
5 TABLET ORAL DAILY
Status: DISCONTINUED | OUTPATIENT
Start: 2019-06-16 | End: 2019-06-16

## 2019-06-16 RX ORDER — HYDROCODONE BITARTRATE AND ACETAMINOPHEN 10; 325 MG/1; MG/1
1 TABLET ORAL EVERY 8 HOURS PRN
Status: DISCONTINUED | OUTPATIENT
Start: 2019-06-16 | End: 2019-06-17 | Stop reason: HOSPADM

## 2019-06-16 RX ORDER — LEVOTHYROXINE SODIUM 0.05 MG/1
50 TABLET ORAL
Status: DISCONTINUED | OUTPATIENT
Start: 2019-06-16 | End: 2019-06-17 | Stop reason: HOSPADM

## 2019-06-16 RX ORDER — WARFARIN SODIUM 5 MG/1
5 TABLET ORAL EVERY OTHER DAY
Status: DISCONTINUED | OUTPATIENT
Start: 2019-06-16 | End: 2019-06-16 | Stop reason: DRUGHIGH

## 2019-06-16 RX ORDER — ALLOPURINOL 300 MG/1
300 TABLET ORAL DAILY
Status: DISCONTINUED | OUTPATIENT
Start: 2019-06-16 | End: 2019-06-16

## 2019-06-16 RX ORDER — PREDNISONE 1 MG/1
5 TABLET ORAL
Status: DISCONTINUED | OUTPATIENT
Start: 2019-06-16 | End: 2019-06-17 | Stop reason: HOSPADM

## 2019-06-16 RX ORDER — BUDESONIDE 1 MG/2ML
1 INHALANT ORAL
Status: DISCONTINUED | OUTPATIENT
Start: 2019-06-16 | End: 2019-06-17 | Stop reason: HOSPADM

## 2019-06-16 RX ADMIN — HYDROCORTISONE SODIUM SUCCINATE 50 MG: 100 INJECTION, POWDER, FOR SOLUTION INTRAMUSCULAR; INTRAVENOUS at 18:36

## 2019-06-16 RX ADMIN — SODIUM CHLORIDE 1000 ML: 9 INJECTION, SOLUTION INTRAVENOUS at 15:00

## 2019-06-16 RX ADMIN — WARFARIN SODIUM 4 MG: 4 TABLET ORAL at 17:27

## 2019-06-16 RX ADMIN — BACLOFEN 10 MG: 10 TABLET ORAL at 10:46

## 2019-06-16 RX ADMIN — HYDROCODONE BITARTRATE AND ACETAMINOPHEN 1 TABLET: 10; 325 TABLET ORAL at 04:51

## 2019-06-16 RX ADMIN — SODIUM CHLORIDE 1000 ML: 9 INJECTION, SOLUTION INTRAVENOUS at 12:45

## 2019-06-16 RX ADMIN — TIZANIDINE 4 MG: 4 TABLET ORAL at 10:46

## 2019-06-16 RX ADMIN — METOCLOPRAMIDE 5 MG: 5 TABLET ORAL at 17:27

## 2019-06-16 RX ADMIN — METOCLOPRAMIDE 5 MG: 5 TABLET ORAL at 11:51

## 2019-06-16 RX ADMIN — PANTOPRAZOLE SODIUM 40 MG: 40 TABLET, DELAYED RELEASE ORAL at 08:54

## 2019-06-16 RX ADMIN — DULOXETINE HYDROCHLORIDE 60 MG: 60 CAPSULE, DELAYED RELEASE ORAL at 10:29

## 2019-06-16 RX ADMIN — LEVOTHYROXINE SODIUM 50 MCG: 50 TABLET ORAL at 08:54

## 2019-06-16 RX ADMIN — METOCLOPRAMIDE 5 MG: 5 TABLET ORAL at 20:15

## 2019-06-16 RX ADMIN — ARFORMOTEROL TARTRATE 15 MCG: 15 SOLUTION RESPIRATORY (INHALATION) at 11:01

## 2019-06-16 RX ADMIN — TOPIRAMATE 25 MG: 25 TABLET, FILM COATED ORAL at 08:54

## 2019-06-16 RX ADMIN — BUDESONIDE 1 MG: 1 SUSPENSION RESPIRATORY (INHALATION) at 19:49

## 2019-06-16 RX ADMIN — IPRATROPIUM BROMIDE AND ALBUTEROL SULFATE 3 ML: 2.5; .5 SOLUTION RESPIRATORY (INHALATION) at 19:49

## 2019-06-16 RX ADMIN — PREDNISONE 5 MG: 5 TABLET ORAL at 08:54

## 2019-06-16 NOTE — PROGRESS NOTES
Connor Metcalf MD                          [phone]      Patient ID:    Name:  Alison Colvin    MRN:  [id number]    1950   68 y.o.  female            Patient Care Team:  Nathan Shipman MD as PCP - General (Internal Medicine)  Dillon Herron Jr., MD (Inactive) as PCP - Family Medicine  Zac, John Hamilton MD as PCP - Claims Attributed    CC/ Reason for visit: Per Assessment mentioned below    Subjective: Pt seen and examined this AM. No acute overnight events noted. Doing better.  Not requiring pressors anymore.  On IV fluids.  States that she is feeling better.    ROS: Denies any subjective fevers, chest pain, nausea/ vomiting    Objective     Vital Signs past 24hrs    BP range: BP: ()/(34-79) 93/50  Pulse range: Heart Rate:  [] 99  Resp rate range: Resp:  [15-22] 22  Temp range: Temp (24hrs), Av.1 °F (37.8 °C), Min:99.2 °F (37.3 °C), Max:102.9 °F (39.4 °C)      Ventilator/Non-Invasive Ventilation Settings (From admission, onward)    None          Device (Oxygen Therapy): room air       101 kg (222 lb 7.1 oz); Body mass index is 40.69 kg/m².      Intake/Output Summary (Last 24 hours) at 2019 0809  Last data filed at 2019 0600  Gross per 24 hour   Intake 4001 ml   Output --   Net 4001 ml       PHYSICAL EXAM   Constitutional: Middle aged morbidly obese white female pt in bed, No acute respiratory distress, No accessory muscle use  Head: - NCAT  Eyes: No pallor, Anicteric conjunctiva, EOMI.  ENMT:  Mallampati 4, no oral thrush. No palpable cervical lymphadenopathy, Dry MM.   Heart: RRR, no murmur. No pedal edema   Lungs: BRIJESH +, No wheezes/ crackles heard    Abdomen: Soft. No tenderness, guarding or rigidity. No palpable masses  Extremities: Extremities warm and well perfused, several skin abnormalities  Neuro: Conscious, answers appropriately, no gross focal neuro deficits  Psych: Mood and affect appropriate    Scheduled meds:     allopurinol 300 mg Oral Daily   DULoxetine 60 mg Oral Daily   levothyroxine 50 mcg Oral Q AM   pantoprazole 40 mg Oral Q AM   predniSONE 5 mg Oral Daily With Breakfast   topiramate 25 mg Oral BID   warfarin 4 mg Oral Daily       IV meds:                        EPINEPHrine 0.02-0.3 mcg/kg/min    Pharmacy to dose warfarin     sodium chloride 125 mL/hr Last Rate: 125 mL/hr (06/15/19 2222)       Data Review:      Results from last 7 days   Lab Units 06/16/19  0526 06/16/19  0323 06/15/19  1658   SODIUM mmol/L  --  135* 135*   POTASSIUM mmol/L  --  4.5 4.6   CHLORIDE mmol/L  --  101 97*   CO2 mmol/L  --  20.0* 22.1   BUN mg/dL  --  26* 29*   CREATININE mg/dL  --  2.38* 2.73*   CALCIUM mg/dL  --  8.8 9.6   BILIRUBIN mg/dL  --   --  0.9   ALK PHOS U/L  --   --  58   ALT (SGPT) U/L  --   --  12   AST (SGOT) U/L  --   --  25   GLUCOSE mg/dL  --  70 95   WBC 10*3/mm3  --  23.41* 28.90*   HEMOGLOBIN g/dL  --  10.8* 12.5   PLATELETS 10*3/mm3  --  136* 163   INR  1.55*  --  1.45*       Lab Results   Component Value Date    CALCIUM 8.8 06/16/2019    PHOS 3.8 06/18/2016                    Results Review:    I have reviewed the relevant laboratory results and reviewed the chest imaging from the last 3 days personally and summarized it below    Assessment    Anaphylactic versus septic shock  Adverse reaction to pneumococcal 23 Valent vaccination  CARRIE on CKD  A. fib on anticoagulation  KATHRIN, no longer on CPAP  Never smoker, unlikely COPD  ? Asthma on nebulized meds   Obesity  Chronic prednisone 5 mg (supposedly for asthma)    PLAN:  Patient responded well to pressor support.  Now doing well with volume resuscitation.  We will back off at this point.  No obvious source of infection.  We will stop antibiotics.  She sees Dr. Tr Pendleton in the office.  Is on steroids and nebulized meds per him she says. Will continue for now.   Noted acute on chronic kidney disease.  Will decrease to IV fluids but would defer further decision to  nephrology who will be consulted now.  No longer on CPAP.  Morbid obesity.  Will need to be out of bed and starting physical therapy  We will transfer patient out of ICU now    I have discussed my findings and recommendations with patient, nursing staff and consulting provider.     Connor Metcalf MD  6/16/2019

## 2019-06-16 NOTE — PROGRESS NOTES
Pharmacy Consult: Warfarin Dosing/ Monitoring    Alison Colvin is a 68 y.o. female, estimated creatinine clearance is 21.3 mL/min (A) (by C-G formula based on SCr of 2.73 mg/dL (H)). weighing 101 kg (222 lb 7.1 oz).     has a past medical history of Abnormal nuclear stress test, Acute sinusitis, Allergy, Benign essential hypertension, Cellulitis, Chest pain, Colon cancer (CMS/HCC), COPD (chronic obstructive pulmonary disease) (CMS/HCC), Disease of thyroid gland, Edema of leg, Epilepsy (CMS/HCC), Esophageal reflux, Foot pain, Myalgia and myositis, Onychomycosis of toenail, Transient cerebral ischemia, and URI (upper respiratory infection).    Social History     Tobacco Use   • Smoking status: Never Smoker   • Smokeless tobacco: Never Used   Substance Use Topics   • Alcohol use: No   • Drug use: No       Results from last 7 days   Lab Units 06/15/19  1658   INR  1.45*   HEMOGLOBIN g/dL 12.5   HEMATOCRIT % 40.4   PLATELETS 10*3/mm3 163     Results from last 7 days   Lab Units 06/15/19  1658   SODIUM mmol/L 135*   POTASSIUM mmol/L 4.6   CHLORIDE mmol/L 97*   CO2 mmol/L 22.1   BUN mg/dL 29*   CREATININE mg/dL 2.73*   CALCIUM mg/dL 9.6   BILIRUBIN mg/dL 0.9   ALK PHOS U/L 58   ALT (SGPT) U/L 12   AST (SGOT) U/L 25   GLUCOSE mg/dL 95     Anticoagulation history: Coumadin 2.5 mg every other day, alternating with 5 mg every other day    Hospital Anticoagulation:  Consulting provider: Kevin Encinas MD  Start date: 6/16/19  Indication: AFib  Target INR: 2-3  Bridge Therapy: No     Date 5/28 6/15           INR 1.83 1.45           Warfarin dose               Potential drug interactions: Allopurinol, Duloxetine, Levothyroxine    Relevant nutrition status: N/A    Other:      Education complete?/ Date:      Assessment/Plan:  Dose 4 mg po daily   Monitor Daily INR    Pharmacy will continue to follow until discharge or discontinuation of warfarin.   Matthieu Park [facility]  6/16/2019

## 2019-06-16 NOTE — CONSULTS
Referring Provider: Connor Metcalf MD  Reason for Consultation: Evaluation of patient with acute kidney injury on chronic kidney disease    Subjective     Chief complaint   Chief Complaint   Patient presents with   • Fever       History of present illness:   This 68-year-old  female was admitted on 6/15/2019 with weakness and hypotension, also she was febrile, she was placed on vasopressors.  She had Pneumovax last Wednesday and she developed symptoms of weakness and the fever since then she was given ceftriaxone in the emergency department, noted to have increased creatinine, hence nephrology consult was requested  The patient stated that she is aware of having kidney disease but she delivers followed up by nephrologist last time was seen in  2016 in the hospital by nephrology.  She denies being diabetic, she had a prior stroke caused significant a aphasia but recovered, prior history of seizure related to the stroke also has no recurrence, she denies being hypertensive even though being treated and it is reported in her medical record.  She had a history of abnormal stress test and recurrent chest pain, prior history of A. fib in 2016, currently has sinus rhythm, she stated that she had obstructive sleep apnea but that has resolved.  She has hypothyroidism, gastroesophageal reflux disease, also diagnosed with COPD, no nephrolithiasis, denies the use of nonsteroidal anti-inflammatory drugs.  She has history of chronic pain, hypothyroidism, and history of gout  She has no chest pain at present denies shortness of air, she is unable to sleep flat in bed, no hemoptysis or cough, no nausea or vomiting, no constipation or diarrhea, no melena, hematochezia or hematemesis.  No dysuria, gross hematuria bladder outlet symptoms.  Creatinine on 12/1/2017 was 1.75, on 1/4/2019 1.46, on 5/28/2019 1.89.  On admission 2.73 and now today down to 2.38, her electrolytes within normal range.  On admission hemoglobin  12.5, down to 10.8 today, her WBCs on admission 28,923.41 today, also platelet on admission was 163,000 and this morning it is 1 36,000.  It was 1 36,000 and January 2019  Past Medical History:   Diagnosis Date   • Abnormal nuclear stress test    • Acute sinusitis    • Allergy    • Benign essential hypertension    • Cellulitis    • Chest pain    • Colon cancer (CMS/HCC)    • COPD (chronic obstructive pulmonary disease) (CMS/HCC)    • Disease of thyroid gland    • Edema of leg    • Epilepsy (CMS/HCC)    • Esophageal reflux    • Foot pain    • Myalgia and myositis    • Onychomycosis of toenail    • Transient cerebral ischemia    • URI (upper respiratory infection)      Past Surgical History:   Procedure Laterality Date   • CATARACT EXTRACTION     • COLON SURGERY     • COLONOSCOPY     • ENDOSCOPY N/A 6/17/2016    Procedure: ESOPHAGOGASTRODUODENOSCOPY with biopsy and viral culture;  Surgeon: Presley Cornelius MD;  [facility]: [facility];  Service:    • HIP SURGERY     • HYSTERECTOMY       Family History   Problem Relation Age of Onset   • Cancer Mother         colon; kidneys   • Other Father         cardiac disorder   • Cancer Brother         colon       Social History     Tobacco Use   • Smoking status: Never Smoker   • Smokeless tobacco: Never Used   Substance Use Topics   • Alcohol use: No   • Drug use: No     Medications Prior to Admission   Medication Sig Dispense Refill Last Dose   • allopurinol (ZYLOPRIM) 300 MG tablet Take 1 tablet by mouth Daily. 90 tablet 3    • baclofen (LIORESAL) 10 MG tablet Take 1 tablet by mouth 2 (Two) Times a Day. 180 tablet 1    • calcium carbonate (OS-SEBASTIÁN) 600 MG tablet Take 600 mg by mouth 2 (two) times a day with meals.      • furosemide (LASIX) 40 MG tablet Take 1 tablet by mouth Daily. 90 tablet 3    • HYDROcodone-acetaminophen (NORCO)  MG per tablet TK 1 T PO Q 8 H PRF PAIN  0 Taking   • levothyroxine (SYNTHROID, LEVOTHROID) 50 MCG tablet Take 1 tablet by mouth  "Daily. 90 tablet 3    • lisinopril (PRINIVIL,ZESTRIL) 5 MG tablet Take 1 tablet by mouth Daily. 90 tablet 3    • metoclopramide (REGLAN) 10 MG tablet Take 1 tablet by mouth 4 (Four) Times a Day. 360 tablet 3    • Multiple Vitamins-Minerals (WOMENS 50+ MULTI VITAMIN/MIN PO) Take 1 tablet by mouth daily.   Taking   • OXYGEN-HELIUM IN Inhale.      • pantoprazole (PROTONIX) 40 MG EC tablet Take 1 tablet by mouth 2 (Two) Times a Day. 180 tablet 3    • potassium chloride (K-DUR,KLOR-CON) 20 MEQ CR tablet Take 1 tablet by mouth 2 (Two) Times a Day. 180 tablet 3    • predniSONE (DELTASONE) 5 MG tablet Take 1 tablet by mouth Daily. 90 tablet 1    • tiZANidine (ZANAFLEX) 4 MG tablet Take 1 tablet by mouth 2 (Two) Times a Day. 180 tablet 1    • topiramate (TOPAMAX) 25 MG tablet Take 1 tablet by mouth 2 (Two) Times a Day. 180 tablet 3    • warfarin (COUMADIN) 5 MG tablet Alternate 5 mg with 2.5 mg every other day 90 tablet 3    • BROVANA 15 MCG/2ML nebulizer solution USE 1 VIAL PER NEBULIZER BID  5 Unknown at Unknown time   • budesonide (PULMICORT) 1 MG/2ML nebulizer solution Pulmicort SUSP; Patient Sig: Pulmicort SUSP USE 1 UNIT DOSE VIA NEBULIZER TWICE DAILY; 0; 25-Mar-2013; Active   Unknown at Unknown time   • DULoxetine (CYMBALTA) 60 MG capsule Take 1 capsule by mouth Daily. 90 capsule 3 Unknown at Unknown time   • ipratropium-albuterol (DUO-NEB) 0.5-2.5 mg/mL nebulizer USE 1 VIAL PER NEBULIZER QID  5 Unknown at Unknown time     Allergies:  Pneumococcal vaccines; Asa [aspirin]; Levofloxacin; Meperidine; Naproxen; Pregabalin; Propoxyphene; and Sulfa antibiotics    Review of Systems  14 points review of system was performed and it was negative other than what noted above in the HPI.    Objective     Vital Signs  Temp:  [99.2 °F (37.3 °C)-102.9 °F (39.4 °C)] 99.2 °F (37.3 °C)  Heart Rate:  [] 99  Resp:  [15-22] 22  BP: ()/(34-79) 93/50    Flowsheet Rows      First Filed Value   Admission Height  157.5 cm (62\") " Documented at 06/15/2019 1711   Admission Weight  106 kg (233 lb) Documented at 06/15/2019 1711           No intake/output data recorded.  I/O last 3 completed shifts:  In: 4001 [I.V.:1001; IV Piggyback:3000]  Out: -     Intake/Output Summary (Last 24 hours) at 6/16/2019 0910  Last data filed at 6/16/2019 0600  Gross per 24 hour   Intake 4001 ml   Output --   Net 4001 ml       Physical Exam:  General Appearance: alert, oriented x 3, no acute distress, morbidly  Skin: warm and dry  HEENT: pupils round and reactive to light, oral mucosa normal, nonicteric sclera  Neck: supple, no JVD, trachea midline  Lungs: CTA, unlabored breathing effort  Heart: RRR, normal S1 and S2, no S3, no rub  Abdomen: soft, non-tender,  present bowel sounds to auscultation  : no palpable bladder, no CVA tenderness  Joints: No significant deformities noted, no crepitation of the knees or the ankles.  Lymphatics: No cervical or supraclavicular lymphadenopathy.  Extremities: no edema, cyanosis or clubbing  Neuro: normal speech and mental status      Results Review:  Results for orders placed or performed during the hospital encounter of 06/15/19   Comprehensive Metabolic Panel   Result Value Ref Range    Glucose 95 65 - 99 mg/dL    BUN 29 (H) 8 - 23 mg/dL    Creatinine 2.73 (H) 0.57 - 1.00 mg/dL    Sodium 135 (L) 136 - 145 mmol/L    Potassium 4.6 3.5 - 5.2 mmol/L    Chloride 97 (L) 98 - 107 mmol/L    CO2 22.1 22.0 - 29.0 mmol/L    Calcium 9.6 8.6 - 10.5 mg/dL    Total Protein 7.4 6.0 - 8.5 g/dL    Albumin 4.10 3.50 - 5.20 g/dL    ALT (SGPT) 12 1 - 33 U/L    AST (SGOT) 25 1 - 32 U/L    Alkaline Phosphatase 58 39 - 117 U/L    Total Bilirubin 0.9 0.2 - 1.2 mg/dL    eGFR Non African Amer 17 (L) >60 mL/min/1.73    Globulin 3.3 gm/dL    A/G Ratio 1.2 g/dL    BUN/Creatinine Ratio 10.6 7.0 - 25.0    Anion Gap 15.9 mmol/L   Urinalysis With Microscopic If Indicated (No Culture) - Urine, Catheter   Result Value Ref Range    Color, UA Yellow Yellow, Straw     Appearance, UA Clear Clear    pH, UA 7.5 5.0 - 8.0    Specific Gravity, UA 1.018 1.005 - 1.030    Glucose, UA Negative Negative    Ketones, UA Negative Negative    Bilirubin, UA Negative Negative    Blood, UA Negative Negative    Protein, UA Negative Negative    Leuk Esterase, UA Negative Negative    Nitrite, UA Negative Negative    Urobilinogen, UA 1.0 E.U./dL 0.2 - 1.0 E.U./dL   Troponin   Result Value Ref Range    Troponin T 0.048 (C) 0.000 - 0.030 ng/mL   Lactic Acid, Plasma   Result Value Ref Range    Lactate 1.6 0.5 - 2.0 mmol/L   Protime-INR   Result Value Ref Range    Protime 17.4 (H) 11.7 - 14.2 Seconds    INR 1.45 (H) 0.90 - 1.10   CBC Auto Differential   Result Value Ref Range    WBC 28.90 (H) 3.40 - 10.80 10*3/mm3    RBC 4.13 3.77 - 5.28 10*6/mm3    Hemoglobin 12.5 12.0 - 15.9 g/dL    Hematocrit 40.4 34.0 - 46.6 %    MCV 97.8 (H) 79.0 - 97.0 fL    MCH 30.3 26.6 - 33.0 pg    MCHC 30.9 (L) 31.5 - 35.7 g/dL    RDW 14.4 12.3 - 15.4 %    RDW-SD 51.2 37.0 - 54.0 fl    MPV 11.1 6.0 - 12.0 fL    Platelets 163 140 - 450 10*3/mm3    Neutrophil % 73.5 42.7 - 76.0 %    Lymphocyte % 15.0 (L) 19.6 - 45.3 %    Monocyte % 9.2 5.0 - 12.0 %    Eosinophil % 0.2 (L) 0.3 - 6.2 %    Basophil % 0.4 0.0 - 1.5 %    Immature Grans % 1.7 (H) 0.0 - 0.5 %    Neutrophils, Absolute 21.25 (H) 1.70 - 7.00 10*3/mm3    Lymphocytes, Absolute 4.33 (H) 0.70 - 3.10 10*3/mm3    Monocytes, Absolute 2.66 (H) 0.10 - 0.90 10*3/mm3    Eosinophils, Absolute 0.06 0.00 - 0.40 10*3/mm3    Basophils, Absolute 0.12 0.00 - 0.20 10*3/mm3    Immature Grans, Absolute 0.48 (H) 0.00 - 0.05 10*3/mm3    nRBC 0.2 0.0 - 0.2 /100 WBC   Cortisol   Result Value Ref Range    Cortisol 8.64   mcg/dL   Troponin   Result Value Ref Range    Troponin T 0.040 (C) 0.000 - 0.030 ng/mL   Troponin   Result Value Ref Range    Troponin T 0.037 (C) 0.000 - 0.030 ng/mL   Basic Metabolic Panel   Result Value Ref Range    Glucose 70 65 - 99 mg/dL    BUN 26 (H) 8 - 23 mg/dL     Creatinine 2.38 (H) 0.57 - 1.00 mg/dL    Sodium 135 (L) 136 - 145 mmol/L    Potassium 4.5 3.5 - 5.2 mmol/L    Chloride 101 98 - 107 mmol/L    CO2 20.0 (L) 22.0 - 29.0 mmol/L    Calcium 8.8 8.6 - 10.5 mg/dL    eGFR Non African Amer 20 (L) >60 mL/min/1.73    BUN/Creatinine Ratio 10.9 7.0 - 25.0    Anion Gap 14.0 mmol/L   CBC (No Diff)   Result Value Ref Range    WBC 23.41 (H) 3.40 - 10.80 10*3/mm3    RBC 3.53 (L) 3.77 - 5.28 10*6/mm3    Hemoglobin 10.8 (L) 12.0 - 15.9 g/dL    Hematocrit 35.3 34.0 - 46.6 %    .0 (H) 79.0 - 97.0 fL    MCH 30.6 26.6 - 33.0 pg    MCHC 30.6 (L) 31.5 - 35.7 g/dL    RDW 14.6 12.3 - 15.4 %    RDW-SD 52.8 37.0 - 54.0 fl    MPV 11.3 6.0 - 12.0 fL    Platelets 136 (L) 140 - 450 10*3/mm3   Protime-INR   Result Value Ref Range    Protime 18.2 (H) 11.7 - 14.2 Seconds    INR 1.55 (H) 0.90 - 1.10   POC Glucose Once   Result Value Ref Range    Glucose 83 70 - 130 mg/dL   POC Glucose Once   Result Value Ref Range    Glucose 79 70 - 130 mg/dL   POC Glucose Once   Result Value Ref Range    Glucose 80 70 - 130 mg/dL   POC Glucose Once   Result Value Ref Range    Glucose 95 70 - 130 mg/dL     Imaging Results (last 72 hours)     Procedure Component Value Units Date/Time    XR Chest 2 View [[id number]] Collected:  06/15/19 1901     Updated:  06/15/19 1901    Narrative:       EMERGENCY PA AND LATERAL CHEST X-RAY 06/15/2019     CLINICAL HISTORY: Fever, history of hypertension, COPD.     This is correlated to prior chest CT 06/03/2016 and chest x-ray  06/02/2016.     FINDINGS: The cardiomediastinal silhouette and the pulmonary vasculature  are within normal limits. The lungs are clear. The costophrenic angles  are sharp.       Impression:       No active disease is seen in the chest.                 allopurinol 300 mg Oral Daily   arformoterol 15 mcg Nebulization BID - RT   baclofen 10 mg Oral BID   budesonide 1 mg Nebulization BID - RT   DULoxetine 60 mg Oral Daily   levothyroxine 50 mcg Oral Q AM    metoclopramide 5 mg Oral 4x Daily   pantoprazole 40 mg Oral Q AM   predniSONE 5 mg Oral Daily With Breakfast   tiZANidine 4 mg Oral BID   topiramate 25 mg Oral BID   warfarin 4 mg Oral Daily       Pharmacy to dose warfarin        Assessment/Plan   1.  Acute kidney injury on chronic kidney disease associated with hypotension.  2.  Chronic kidney disease probably related to nephrosclerosis  3.  Febrile illness and hypotension requiring vasopressors being evaluated and treated for sepsis  4.  History of hypertension  5.  History of stroke and seizure in the past  6.  History of gout  7.  Hypothyroidism  8.  Depression  9.  Severe GERD has been on metoclopramide and Protonix  10.  Chronic back pain      Plan:  1.  I agree with the present treatment  2.  Check random urine for sodium, chloride and protein to creatinine ratio  3.  Check iron stores and serum and urine immunofixation  4.  Decrease the allopurinol to 100 mg daily and monitor uric acid  5.  IV fluids, normal saline to run at 100 cc/h  6.  Surveillance labs      Thank you for asking me to see this patient in consultation      I discussed the patients findings and my recommendations with the patient    Matthew Cervantes MD  06/16/19  9:10 AM      Much of this encounter note is an electronic transcription/translation of spoken language to printed text. The electronic translation of spoken language may permit erroneous, or at times, nonsensical words or phrases to be inadvertently transcribed; Although I have reviewed the note for such errors, some may still exist

## 2019-06-16 NOTE — SIGNIFICANT NOTE
06/16/19 1405   Rehab Time/Intention   Evaluation Not Performed unable to evaluate, medical status change  (Per RN BP too low. Check back tomorrow,)   Rehab Treatment   Discipline physical therapist   Recommendation   PT - Next Appointment 06/17/19

## 2019-06-16 NOTE — PLAN OF CARE
Problem: Patient Care Overview  Goal: Plan of Care Review  Outcome: Ongoing (interventions implemented as appropriate)   06/16/19 0707   Coping/Psychosocial   Plan of Care Reviewed With patient   Plan of Care Review   Progress improving   OTHER   Outcome Summary PT ADMITTED TO CCU WITH FEVER AND HYPOTENSION. LOW GRADE FEVER OVERNIGHT. STARTED ON LEVOPHED. THEN STARTED ON MIVF AND WAS ABLE TO WEAN LEVOPHED OFF. VSS. WILL CONTINUE TO MONTIOR.        Problem: Pain, Chronic (Adult)  Goal: Acceptable Pain/Comfort Level and Functional Ability  Outcome: Ongoing (interventions implemented as appropriate)   06/16/19 0707   Pain, Chronic (Adult)   Acceptable Pain/Comfort Level and Functional Ability making progress toward outcome       Problem: Skin Injury Risk (Adult)  Goal: Skin Health and Integrity  Outcome: Ongoing (interventions implemented as appropriate)   06/16/19 0707   Skin Injury Risk (Adult)   Skin Health and Integrity making progress toward outcome       Problem: Fall Risk (Adult)  Goal: Absence of Fall  Outcome: Ongoing (interventions implemented as appropriate)   06/16/19 0707   Fall Risk (Adult)   Absence of Fall making progress toward outcome       Problem: Infection, Risk/Actual (Adult)  Goal: Infection Prevention/Resolution  Outcome: Ongoing (interventions implemented as appropriate)   06/16/19 0707   Infection, Risk/Actual (Adult)   Infection Prevention/Resolution making progress toward outcome

## 2019-06-16 NOTE — NURSING NOTE
UPON ARRIVAL TO [facility] PT WAS HYPOTENSIVE.  PT FALLS ASLEEP EASILY AND HAS REPORTED SHE IS DIZZY SPEECH GARBLED..MD ESTERLY NOTIFIED.  ORDER RECEIVED FOR THE FOLLOWINL NS BOLUS GIVEN AND PT BLOOD CLX X2 COMPLETED.  BP NOT SIGNIFICANTLY IMPROVED.  SEE VS.    PT CONTINUED TO BE GARBALED WHEN SPEAKING.   PT AND  STATE THAT PT BASELINE AT HOME IS WALKING AROUND WITH A WALKER AT HOME AND ELECTRIC W/C FOR LONGER DISTANCES.    DR CASTILLO NOTIFIED OF BP 82/42 AND LOC CHANGE. ORDER RECEIVED FOR SECOND 1LNS BOLUS AND TO TRANSFER PT TO TELEMETRY.  MD NOTIFIED THAT PATIENT IS SCREENED POSITIVE FOR SEPSIS.    DR WALKER NOTIFIED OF PT TRANSFER AND THE ABOVE.

## 2019-06-16 NOTE — PROGRESS NOTES
Pharmacy Consult: Warfarin Dosing/ Monitoring    Alison Colvin is a 68 y.o. female, estimated creatinine clearance is 24.5 mL/min (A) (by C-G formula based on SCr of 2.38 mg/dL (H)). weighing 101 kg (222 lb 7.1 oz).     has a past medical history of Abnormal nuclear stress test, Acute sinusitis, Allergy, Benign essential hypertension, Cellulitis, Chest pain, Colon cancer (CMS/HCC), COPD (chronic obstructive pulmonary disease) (CMS/HCC), Disease of thyroid gland, Edema of leg, Epilepsy (CMS/HCC), Esophageal reflux, Foot pain, Myalgia and myositis, Onychomycosis of toenail, Transient cerebral ischemia, and URI (upper respiratory infection).    Social History     Tobacco Use   • Smoking status: Never Smoker   • Smokeless tobacco: Never Used   Substance Use Topics   • Alcohol use: No   • Drug use: No       Results from last 7 days   Lab Units 06/16/19  0526 06/16/19  0323 06/15/19  1658   INR  1.55*  --  1.45*   HEMOGLOBIN g/dL  --  10.8* 12.5   HEMATOCRIT %  --  35.3 40.4   PLATELETS 10*3/mm3  --  136* 163     Results from last 7 days   Lab Units 06/16/19  0323 06/15/19  1658   SODIUM mmol/L 135* 135*   POTASSIUM mmol/L 4.5 4.6   CHLORIDE mmol/L 101 97*   CO2 mmol/L 20.0* 22.1   BUN mg/dL 26* 29*   CREATININE mg/dL 2.38* 2.73*   CALCIUM mg/dL 8.8 9.6   BILIRUBIN mg/dL  --  0.9   ALK PHOS U/L  --  58   ALT (SGPT) U/L  --  12   AST (SGOT) U/L  --  25   GLUCOSE mg/dL 70 95     Anticoagulation history: Coumadin 2.5 mg every other day, alternating with 5 mg every other day  • There is some question of patient compliance. Please address this during counseling next week.    Hospital Anticoagulation:  Consulting provider: Kevin Encinas MD  Start date: 6/16/19  Indication: AFib  Target INR: 2-3  Bridge Therapy: No     Date 5/28 6/15 6/16          INR 1.83 1.45 1.55          Warfarin dose  4 mg 4 mg            Potential drug interactions:    • Home meds: allopurinol, duloxetine, and levothyroxine can all result in increased risk  of bleeding  • Ceftriaxone-1 dose 6/15, can result in increased risk of bleeding    Relevant nutrition status: N/A    Other:      Education complete?/ Date:      Assessment/Plan:  Today the INR has risen slightly higher towards therapeutic goal of 2-3. Will continue dose of 4 mg qday and monitor closely.    Dose 4 mg po daily   Monitor Daily INR    Pharmacy will continue to follow until discharge or discontinuation of warfarin.   Thanks,  Tyron Bryan Pharm.D, BCCCP  6/16/2019

## 2019-06-17 VITALS
DIASTOLIC BLOOD PRESSURE: 74 MMHG | SYSTOLIC BLOOD PRESSURE: 129 MMHG | RESPIRATION RATE: 16 BRPM | WEIGHT: 222.44 LBS | BODY MASS INDEX: 40.93 KG/M2 | HEART RATE: 81 BPM | TEMPERATURE: 98 F | OXYGEN SATURATION: 98 % | HEIGHT: 62 IN

## 2019-06-17 LAB
ALBUMIN SERPL-MCNC: 3.1 G/DL (ref 3.5–5.2)
ALBUMIN/GLOB SERPL: 1 G/DL
ALP SERPL-CCNC: 46 U/L (ref 39–117)
ALT SERPL W P-5'-P-CCNC: 31 U/L (ref 1–33)
ANION GAP SERPL CALCULATED.3IONS-SCNC: 13.1 MMOL/L
AST SERPL-CCNC: 80 U/L (ref 1–32)
BILIRUB SERPL-MCNC: 0.4 MG/DL (ref 0.2–1.2)
BUN BLD-MCNC: 22 MG/DL (ref 8–23)
BUN/CREAT SERPL: 14.3 (ref 7–25)
CALCIUM SPEC-SCNC: 9 MG/DL (ref 8.6–10.5)
CHLORIDE SERPL-SCNC: 102 MMOL/L (ref 98–107)
CO2 SERPL-SCNC: 17.9 MMOL/L (ref 22–29)
CREAT BLD-MCNC: 1.54 MG/DL (ref 0.57–1)
DEPRECATED RDW RBC AUTO: 51.1 FL (ref 37–54)
ERYTHROCYTE [DISTWIDTH] IN BLOOD BY AUTOMATED COUNT: 14.3 % (ref 12.3–15.4)
GFR SERPL CREATININE-BSD FRML MDRD: 33 ML/MIN/1.73
GLOBULIN UR ELPH-MCNC: 3 GM/DL
GLUCOSE BLD-MCNC: 121 MG/DL (ref 65–99)
HCT VFR BLD AUTO: 31.6 % (ref 34–46.6)
HGB BLD-MCNC: 9.7 G/DL (ref 12–15.9)
INR PPP: 1.59 (ref 0.9–1.1)
LYMPHOCYTES # BLD MANUAL: 0.54 10*3/MM3 (ref 0.7–3.1)
LYMPHOCYTES NFR BLD MANUAL: 4 % (ref 19.6–45.3)
MAGNESIUM SERPL-MCNC: 2.1 MG/DL (ref 1.6–2.4)
MCH RBC QN AUTO: 29.9 PG (ref 26.6–33)
MCHC RBC AUTO-ENTMCNC: 30.7 G/DL (ref 31.5–35.7)
MCV RBC AUTO: 97.5 FL (ref 79–97)
NEUTROPHILS # BLD AUTO: 12.88 10*3/MM3 (ref 1.7–7)
NEUTROPHILS NFR BLD MANUAL: 96 % (ref 42.7–76)
PHOSPHATE SERPL-MCNC: 2.9 MG/DL (ref 2.5–4.5)
PLAT MORPH BLD: NORMAL
PLATELET # BLD AUTO: 145 10*3/MM3 (ref 140–450)
PMV BLD AUTO: 11.2 FL (ref 6–12)
POTASSIUM BLD-SCNC: 4.3 MMOL/L (ref 3.5–5.2)
PROT SERPL-MCNC: 6.1 G/DL (ref 6–8.5)
PROTHROMBIN TIME: 18.6 SECONDS (ref 11.7–14.2)
RBC # BLD AUTO: 3.24 10*6/MM3 (ref 3.77–5.28)
RBC MORPH BLD: NORMAL
SODIUM BLD-SCNC: 133 MMOL/L (ref 136–145)
URATE SERPL-MCNC: 4.1 MG/DL (ref 2.4–5.7)
WBC MORPH BLD: NORMAL
WBC NRBC COR # BLD: 13.42 10*3/MM3 (ref 3.4–10.8)

## 2019-06-17 PROCEDURE — 63710000001 PREDNISONE PER 5 MG: Performed by: INTERNAL MEDICINE

## 2019-06-17 PROCEDURE — 25010000002 HYDROCORTISONE SODIUM SUCCINATE 100 MG RECONSTITUTED SOLUTION: Performed by: INTERNAL MEDICINE

## 2019-06-17 PROCEDURE — 80053 COMPREHEN METABOLIC PANEL: CPT | Performed by: INTERNAL MEDICINE

## 2019-06-17 PROCEDURE — 97162 PT EVAL MOD COMPLEX 30 MIN: CPT

## 2019-06-17 PROCEDURE — 85007 BL SMEAR W/DIFF WBC COUNT: CPT | Performed by: INTERNAL MEDICINE

## 2019-06-17 PROCEDURE — 84100 ASSAY OF PHOSPHORUS: CPT | Performed by: INTERNAL MEDICINE

## 2019-06-17 PROCEDURE — 94799 UNLISTED PULMONARY SVC/PX: CPT

## 2019-06-17 PROCEDURE — 84550 ASSAY OF BLOOD/URIC ACID: CPT | Performed by: INTERNAL MEDICINE

## 2019-06-17 PROCEDURE — 97110 THERAPEUTIC EXERCISES: CPT

## 2019-06-17 PROCEDURE — 83735 ASSAY OF MAGNESIUM: CPT | Performed by: INTERNAL MEDICINE

## 2019-06-17 PROCEDURE — 85025 COMPLETE CBC W/AUTO DIFF WBC: CPT | Performed by: INTERNAL MEDICINE

## 2019-06-17 PROCEDURE — 85610 PROTHROMBIN TIME: CPT | Performed by: INTERNAL MEDICINE

## 2019-06-17 RX ORDER — WARFARIN SODIUM 4 MG/1
4 TABLET ORAL
Status: DISCONTINUED | OUTPATIENT
Start: 2019-06-18 | End: 2019-06-17 | Stop reason: HOSPADM

## 2019-06-17 RX ORDER — PREDNISONE 20 MG/1
40 TABLET ORAL DAILY
Qty: 10 TABLET | Refills: 0 | Status: SHIPPED | OUTPATIENT
Start: 2019-06-17 | End: 2019-06-22

## 2019-06-17 RX ORDER — NYSTATIN 100000 [USP'U]/G
POWDER TOPICAL EVERY 12 HOURS SCHEDULED
Status: DISCONTINUED | OUTPATIENT
Start: 2019-06-17 | End: 2019-06-17 | Stop reason: HOSPADM

## 2019-06-17 RX ORDER — FUROSEMIDE 40 MG/1
20 TABLET ORAL DAILY
Qty: 30 TABLET | Refills: 0 | Status: SHIPPED | OUTPATIENT
Start: 2019-06-17 | End: 2019-10-24 | Stop reason: SDUPTHER

## 2019-06-17 RX ORDER — WARFARIN SODIUM 5 MG/1
5 TABLET ORAL
Status: DISCONTINUED | OUTPATIENT
Start: 2019-06-17 | End: 2019-06-17 | Stop reason: HOSPADM

## 2019-06-17 RX ORDER — NYSTATIN 100000 [USP'U]/G
POWDER TOPICAL EVERY 12 HOURS SCHEDULED
Qty: 15 G | Refills: 0 | Status: SHIPPED | OUTPATIENT
Start: 2019-06-17

## 2019-06-17 RX ORDER — PREDNISONE 1 MG/1
40 TABLET ORAL DAILY
Qty: 10 TABLET | Refills: 0 | Status: SHIPPED | OUTPATIENT
Start: 2019-06-17 | End: 2019-06-17 | Stop reason: SDUPTHER

## 2019-06-17 RX ADMIN — HYDROCORTISONE SODIUM SUCCINATE 50 MG: 100 INJECTION, POWDER, FOR SOLUTION INTRAMUSCULAR; INTRAVENOUS at 01:39

## 2019-06-17 RX ADMIN — HYDROCORTISONE SODIUM SUCCINATE 50 MG: 100 INJECTION, POWDER, FOR SOLUTION INTRAMUSCULAR; INTRAVENOUS at 09:40

## 2019-06-17 RX ADMIN — METOCLOPRAMIDE 5 MG: 5 TABLET ORAL at 13:15

## 2019-06-17 RX ADMIN — PANTOPRAZOLE SODIUM 40 MG: 40 TABLET, DELAYED RELEASE ORAL at 05:40

## 2019-06-17 RX ADMIN — HYDROCODONE BITARTRATE AND ACETAMINOPHEN 1 TABLET: 10; 325 TABLET ORAL at 13:17

## 2019-06-17 RX ADMIN — PREDNISONE 5 MG: 5 TABLET ORAL at 09:40

## 2019-06-17 RX ADMIN — LEVOTHYROXINE SODIUM 50 MCG: 50 TABLET ORAL at 05:40

## 2019-06-17 RX ADMIN — ARFORMOTEROL TARTRATE 15 MCG: 15 SOLUTION RESPIRATORY (INHALATION) at 10:24

## 2019-06-17 RX ADMIN — METOCLOPRAMIDE 5 MG: 5 TABLET ORAL at 09:40

## 2019-06-17 RX ADMIN — ALLOPURINOL 100 MG: 100 TABLET ORAL at 09:40

## 2019-06-17 RX ADMIN — DULOXETINE HYDROCHLORIDE 60 MG: 60 CAPSULE, DELAYED RELEASE ORAL at 09:40

## 2019-06-17 NOTE — PROGRESS NOTES
"   LOS: 2 days    Patient Care Team:  Nathan Shipman MD as PCP - General (Internal Medicine)  Dillon Herron Jr., MD (Inactive) as PCP - Family Medicine  John Frank MD as PCP - Claims Attributed    Chief Complaint:    Chief Complaint   Patient presents with   • Fever     Follow UP acute kidney injury on chronic kidney disease  Subjective     Interval History:   He is feeling much better today, denies any chest pain or shortness of no orthopnea or PND, no nausea or vomiting, no abdominal pain, no dysuria, no lightheadedness, no edema.  Out of the ICU and has been off pressors.      Review of Systems:   As noted above    Objective     Vital Signs  Temp:  [97.8 °F (36.6 °C)-98.9 °F (37.2 °C)] 98 °F (36.7 °C)  Heart Rate:  [] 81  Resp:  [16-18] 16  BP: ()/(43-74) 129/74    Flowsheet Rows      First Filed Value   Admission Height  157.5 cm (62\") Documented at 06/15/2019 1711   Admission Weight  106 kg (233 lb) Documented at 06/15/2019 1711          No intake/output data recorded.  I/O last 3 completed shifts:  In: 6001 [I.V.:1001; IV Piggyback:5000]  Out: 2325 [Urine:2325]    Intake/Output Summary (Last 24 hours) at 6/17/2019 1531  Last data filed at 6/17/2019 0426  Gross per 24 hour   Intake --   Output 2325 ml   Net -2325 ml       Physical Exam:  General Appearance: alert, oriented x 3, no acute distress, morbidly  Skin: warm and dry  HEENT: pupils round and reactive to light, oral mucosa normal, nonicteric sclera  Neck: supple, no JVD, trachea midline  Lungs: CTA, unlabored breathing effort  Heart: RRR, normal S1 and S2, no S3, no rub  Abdomen: soft, non-tender,  present bowel sounds to auscultation  : no palpable bladder, no CVA tenderness.  Extremities: no edema, cyanosis or clubbing  Neuro: normal speech and mental status            Results Review:    Results from last 7 days   Lab Units 06/17/19  0541 06/16/19  0323 06/15/19  1658   SODIUM mmol/L 133* 135* 135*   POTASSIUM mmol/L " 4.3 4.5 4.6   CHLORIDE mmol/L 102 101 97*   CO2 mmol/L 17.9* 20.0* 22.1   BUN mg/dL 22 26* 29*   CREATININE mg/dL 1.54* 2.38* 2.73*   CALCIUM mg/dL 9.0 8.8 9.6   BILIRUBIN mg/dL 0.4  --  0.9   ALK PHOS U/L 46  --  58   ALT (SGPT) U/L 31  --  12   AST (SGOT) U/L 80*  --  25   GLUCOSE mg/dL 121* 70 95       Estimated Creatinine Clearance: 37.8 mL/min (A) (by C-G formula based on SCr of 1.54 mg/dL (H)).    Results from last 7 days   Lab Units 06/17/19  0541   MAGNESIUM mg/dL 2.1   PHOSPHORUS mg/dL 2.9       Results from last 7 days   Lab Units 06/17/19  0541   URIC ACID mg/dL 4.1       Results from last 7 days   Lab Units 06/17/19  0541 06/16/19  0323 06/15/19  1658   WBC 10*3/mm3 13.42* 23.41* 28.90*   HEMOGLOBIN g/dL 9.7* 10.8* 12.5   PLATELETS 10*3/mm3 145 136* 163       Results from last 7 days   Lab Units 06/17/19  0541 06/16/19  0526 06/15/19  1658   INR  1.59* 1.55* 1.45*         Imaging Results (last 24 hours)     Procedure Component Value Units Date/Time    XR Chest 2 View [[id number]] Collected:  06/15/19 1901     Updated:  06/16/19 1907    Narrative:       EMERGENCY PA AND LATERAL CHEST X-RAY 06/15/2019     CLINICAL HISTORY: Fever, history of hypertension, COPD.     This is correlated to prior chest CT 06/03/2016 and chest x-ray  06/02/2016.     FINDINGS: The cardiomediastinal silhouette and the pulmonary vasculature  are within normal limits. The lungs are clear. The costophrenic angles  are sharp.       Impression:       No active disease is seen in the chest.     This report was finalized on 6/16/2019 7:03 PM by Dr. Orlando Alvarez M.D.             allopurinol 100 mg Oral Daily   arformoterol 15 mcg Nebulization BID - RT   budesonide 1 mg Nebulization BID - RT   DULoxetine 60 mg Oral Daily   hydrocortisone sodium succinate 50 mg Intravenous Q8H   levothyroxine 50 mcg Oral Q AM   metoclopramide 5 mg Oral 4x Daily   nystatin  Topical Q12H   pantoprazole 40 mg Oral Q AM   predniSONE 5 mg Oral Daily With  Breakfast   [START ON 6/18/2019] warfarin 4 mg Oral Daily   warfarin 5 mg Oral Once       Pharmacy to dose warfarin        Medication Review:   Current Facility-Administered Medications   Medication Dose Route Frequency Provider Last Rate Last Dose   • allopurinol (ZYLOPRIM) tablet 100 mg  100 mg Oral Daily Matthew Cervantes MD   100 mg at 06/17/19 0940   • arformoterol (BROVANA) nebulizer solution 15 mcg  15 mcg Nebulization BID - RT Cononr Metcalf MD   15 mcg at 06/17/19 1024   • budesonide (PULMICORT) nebulizer solution 1 mg  1 mg Nebulization BID - RT Connor Metcalf MD   1 mg at 06/16/19 1949   • DULoxetine (CYMBALTA) DR capsule 60 mg  60 mg Oral Daily Kevin Encinas MD   60 mg at 06/17/19 0940   • HYDROcodone-acetaminophen (NORCO)  MG per tablet 1 tablet  1 tablet Oral Q8H PRN Kevin Encinas MD   1 tablet at 06/17/19 1317   • hydrocortisone sodium succinate (Solu-CORTEF) injection 50 mg  50 mg Intravenous Q8H Connor Metcalf MD   50 mg at 06/17/19 0940   • ipratropium-albuterol (DUO-NEB) nebulizer solution 3 mL  3 mL Nebulization Q6H PRN Connor Metcalf MD   3 mL at 06/16/19 1949   • levothyroxine (SYNTHROID, LEVOTHROID) tablet 50 mcg  50 mcg Oral Q AM Kevin Encinas MD   50 mcg at 06/17/19 0540   • metoclopramide (REGLAN) tablet 5 mg  5 mg Oral 4x Daily Connor Metcalf MD   5 mg at 06/17/19 1315   • nystatin (MYCOSTATIN) powder   Topical Q12H Connor Metcalf MD   Stopped at 06/17/19 1447   • pantoprazole (PROTONIX) EC tablet 40 mg  40 mg Oral Q AM Kevin Encinas MD   40 mg at 06/17/19 0540   • Pharmacy to dose warfarin   Does not apply Continuous PRN Kevin Encinas MD       • predniSONE (DELTASONE) tablet 5 mg  5 mg Oral Daily With Breakfast Kevin Encinas MD   5 mg at 06/17/19 0940   • sodium chloride 0.9 % flush 10 mL  10 mL Intravenous PRN Parag Mohr MD       • [START ON 6/18/2019] warfarin (COUMADIN) tablet 4 mg  4 mg Oral Daily Tea  Connor Cruz MD       • warfarin (COUMADIN) tablet 5 mg  5 mg Oral Once Connor Metcalf MD           Assessment/Plan      1.  Acute kidney injury on chronic kidney disease associated with hypotension.,  Creatinine is improving down to 1.54  2.  Chronic kidney disease probably related to nephrosclerosis  3.  Febrile illness and hypotension requiring vasopressors being evaluated and treated for sepsis  4.  History of hypertension  5.  History of stroke and seizure in the past  6.  History of gout  7.  Hypothyroidism  8.  Depression  9.  Severe GERD has been on metoclopramide and Protonix  10.  Chronic back pain  11.  Mild hyponatremia, sodium 133  12.  Anemia, hemoglobin 9.7        Plan:  1.  Continue the same treatment  2.  Be discharged from the renal standpoint he will be followed as an outpatient        Matthew Cervantes MD  06/17/19  3:31 PM

## 2019-06-17 NOTE — PROGRESS NOTES
Pharmacy Consult: Warfarin Dosing/ Monitoring    Alison Colvin is a 68 y.o. female, estimated creatinine clearance is 24.5 mL/min (A) (by C-G formula based on SCr of 2.38 mg/dL (H)). weighing 101 kg (222 lb 7.1 oz).     has a past medical history of Abnormal nuclear stress test, Acute sinusitis, Allergy, Benign essential hypertension, Cellulitis, Chest pain, Colon cancer (CMS/HCC), COPD (chronic obstructive pulmonary disease) (CMS/HCC), Disease of thyroid gland, Edema of leg, Epilepsy (CMS/HCC), Esophageal reflux, Foot pain, Myalgia and myositis, Onychomycosis of toenail, Transient cerebral ischemia, and URI (upper respiratory infection).    Social History     Tobacco Use   • Smoking status: Never Smoker   • Smokeless tobacco: Never Used   Substance Use Topics   • Alcohol use: No   • Drug use: No       Results from last 7 days   Lab Units 06/16/19  0526 06/16/19  0323 06/15/19  1658   INR  1.55*  --  1.45*   HEMOGLOBIN g/dL  --  10.8* 12.5   HEMATOCRIT %  --  35.3 40.4   PLATELETS 10*3/mm3  --  136* 163     Results from last 7 days   Lab Units 06/16/19  0323 06/15/19  1658   SODIUM mmol/L 135* 135*   POTASSIUM mmol/L 4.5 4.6   CHLORIDE mmol/L 101 97*   CO2 mmol/L 20.0* 22.1   BUN mg/dL 26* 29*   CREATININE mg/dL 2.38* 2.73*   CALCIUM mg/dL 8.8 9.6   BILIRUBIN mg/dL  --  0.9   ALK PHOS U/L  --  58   ALT (SGPT) U/L  --  12   AST (SGOT) U/L  --  25   GLUCOSE mg/dL 70 95     Anticoagulation history: Coumadin 2.5 mg every other day, alternating with 5 mg every other day  • There is some question of patient compliance. Please address this during counseling next week.    Hospital Anticoagulation:  Consulting provider: Kevin Encinas MD  Start date: 6/16/19  Indication: AFib  Target INR: 2-3  Bridge Therapy: No     Date 5/28 6/15 6/16 6/17         INR 1.83 1.45 1.55 1.59         Warfarin dose  4 mg 4 mg 5 mg           Potential drug interactions:    • Home meds: allopurinol, duloxetine, and levothyroxine can all result in  increased risk of bleeding  • Ceftriaxone-1 dose 6/15, can result in increased risk of bleeding    Relevant nutrition status: N/A    Other:      Education complete?/ Date:      Assessment/Plan:  Today the INR has risen slightly higher towards therapeutic goal of 2-3. Will give a 5 mg dose today given the marginal increase and continue 4 mg daily for 6/18 onward. Will watch H/H given these are trending down.    Dose 4 mg po daily   Monitor Daily INR    Pharmacy will continue to follow until discharge or discontinuation of warfarin.     Thanks,  Chau Aguirre, PharmD, BCPS  Clinical Staff Pharmacist  Ext. [phone]  6/16/2019

## 2019-06-17 NOTE — PLAN OF CARE
Problem: Patient Care Overview  Goal: Plan of Care Review  Outcome: Ongoing (interventions implemented as appropriate)   06/17/19 0603   Coping/Psychosocial   Plan of Care Reviewed With patient   Plan of Care Review   Progress improving   OTHER   Outcome Summary BPs stable after NS bolus on dayshift. Patient is alert and oriented x4. Using the bedpan, as she still reports dizziness. No complaints of pain. Consult for wound entered for MASD in skin folds. No complaints of pain. VSS       Problem: Pain, Chronic (Adult)  Goal: Acceptable Pain/Comfort Level and Functional Ability  Outcome: Ongoing (interventions implemented as appropriate)      Problem: Skin Injury Risk (Adult)  Goal: Skin Health and Integrity  Outcome: Ongoing (interventions implemented as appropriate)      Problem: Fall Risk (Adult)  Goal: Absence of Fall  Outcome: Ongoing (interventions implemented as appropriate)      Problem: Infection, Risk/Actual (Adult)  Goal: Infection Prevention/Resolution  Outcome: Ongoing (interventions implemented as appropriate)

## 2019-06-17 NOTE — PLAN OF CARE
Problem: Patient Care Overview  Goal: Plan of Care Review  Outcome: Ongoing (interventions implemented as appropriate)   06/17/19 8227   Coping/Psychosocial   Plan of Care Reviewed With patient   Plan of Care Review   Progress improving   OTHER   Outcome Summary Pt to be Discharged today to home; VSS; BP is more stable; Worked well with PT- ambulated;      Goal: Individualization and Mutuality  Outcome: Ongoing (interventions implemented as appropriate)    Goal: Discharge Needs Assessment  Outcome: Ongoing (interventions implemented as appropriate)    Goal: Interprofessional Rounds/Family Conf  Outcome: Ongoing (interventions implemented as appropriate)      Problem: Pain, Chronic (Adult)  Goal: Identify Related Risk Factors and Signs and Symptoms  Outcome: Ongoing (interventions implemented as appropriate)    Goal: Acceptable Pain/Comfort Level and Functional Ability  Outcome: Ongoing (interventions implemented as appropriate)      Problem: Skin Injury Risk (Adult)  Goal: Identify Related Risk Factors and Signs and Symptoms  Outcome: Ongoing (interventions implemented as appropriate)    Goal: Skin Health and Integrity  Outcome: Ongoing (interventions implemented as appropriate)      Problem: Fall Risk (Adult)  Goal: Identify Related Risk Factors and Signs and Symptoms  Outcome: Ongoing (interventions implemented as appropriate)      Problem: Infection, Risk/Actual (Adult)  Goal: Identify Related Risk Factors and Signs and Symptoms  Outcome: Ongoing (interventions implemented as appropriate)

## 2019-06-17 NOTE — THERAPY EVALUATION
Acute Care - Physical Therapy Initial Evaluation  [facility]     Patient Name: Alison Colvin  : 1950  MRN: [id number]  Today's Date: 2019      Date of Referral to PT: 19  Referring Physician: Dr. Encinas      Admit Date: 6/15/2019    Visit Dx:     ICD-10-CM ICD-9-CM   1. Fever, unspecified fever cause R50.9 780.60   2. CARRIE (acute kidney injury) (CMS/[facility]) N17.9 584.9   3. Severe sepsis (CMS/[facility]) A41.9 038.9    R65.20 995.92   4. Elevated troponin R74.8 790.6   5. Impaired functional mobility, balance, gait, and endurance Z74.09 V49.89     Patient Active Problem List   Diagnosis   • Anemia of chronic disorder   • Asthma   • Benign essential hypertension   • Chronic venous insufficiency   • Depression   • Degeneration of intervertebral disc of lumbosacral region   • Duodenal ulcer   • Gastroesophageal reflux disease   • Fibromyalgia   • Gastric ulcer   • Gastroparesis   • Hypothyroidism   • Migraine   • Peripheral neuropathy   • Restrictive lung disease   • Upper gastrointestinal hemorrhage   • Urinary incontinence   • Osteoarthritis of both hands   • Gout with tophus   • PAF (paroxysmal atrial fibrillation) (CMS/[facility])   • History of pulmonary embolism   • History of deep vein thrombophlebitis of lower extremity   • History of inferior vena caval filter placement   • Inferior vena cava occlusion (CMS/[facility])   • Chronic intermittent hypoxia with obstructive sleep apnea   • Chronic renal impairment, stage 3 (moderate) (CMS/[facility])   • Nocturnal hypoxia   • Obstructive sleep apnea syndrome   • Fever     Past Medical History:   Diagnosis Date   • Abnormal nuclear stress test    • Acute sinusitis    • Allergy    • Benign essential hypertension    • Cellulitis    • Chest pain    • Colon cancer (CMS/HCC)    • COPD (chronic obstructive pulmonary disease) (CMS/[facility])    • Disease of thyroid gland    • Edema of leg    • Epilepsy (CMS/HCC)    • Esophageal reflux    • Foot pain    • Myalgia and myositis    •  Onychomycosis of toenail    • Transient cerebral ischemia    • URI (upper respiratory infection)      Past Surgical History:   Procedure Laterality Date   • CATARACT EXTRACTION     • COLON SURGERY     • COLONOSCOPY     • ENDOSCOPY N/A 6/17/2016    Procedure: ESOPHAGOGASTRODUODENOSCOPY with biopsy and viral culture;  Surgeon: Presley Cornelius MD;  Location: [facility] ENDOSCOPY;  Service:    • HIP SURGERY     • HYSTERECTOMY          PT ASSESSMENT (last 12 hours)      Physical Therapy Evaluation     Row Name 06/17/19 1316          PT Evaluation Time/Intention    Subjective Information  complains of;weakness;fatigue  -MS     Document Type  evaluation  -MS     Mode of Treatment  physical therapy  -MS     Patient Effort  good  -MS     Symptoms Noted During/After Treatment  fatigue  -MS     Row Name 06/17/19 1316          General Information    Patient Profile Reviewed?  yes  -MS     Referring Physician  Dr. Encinas  -MS     Patient Observations  alert;cooperative;agree to therapy  -MS     Patient/Family Observations  Resting in bed with HOB elevated, NAD  -MS     Prior Level of Function  independent:;all household mobility;min assist:;ADL's  -MS     Equipment Currently Used at Home  rollator;wheelchair, motorized household distances only  -MS     Pertinent History of Current Functional Problem  Admission from home for fever. Hx of COPD- wears O2 at night. Denies any recent falls.  -MS     Existing Precautions/Restrictions  fall  -MS     Limitations/Impairments  safety/cognitive  -MS     Risks Reviewed  patient:  -MS     Benefits Reviewed  patient:  -MS     Barriers to Rehab  none identified  -MS     Row Name 06/17/19 1316          Cognitive Assessment/Intervention- PT/OT    Orientation Status (Cognition)  oriented x 4  -MS     Follows Commands (Cognition)  WFL  -MS     Safety Deficit (Cognitive)  mild deficit;at risk behavior observed;awareness of need for assistance  -MS     Personal Safety Interventions  fall prevention  program maintained;gait belt;nonskid shoes/slippers when out of bed  -MS     Row Name 06/17/19 1316          Safety Issues, Functional Mobility    Impairments Affecting Function (Mobility)  strength;shortness of breath;endurance/activity tolerance  -MS     Row Name 06/17/19 1316          Bed Mobility Assessment/Treatment    Bed Mobility Assessment/Treatment  supine-sit;sit-supine  -MS     Supine-Sit [place] (Bed Mobility)  supervision;verbal cues  -MS     Sit-Supine [place] (Bed Mobility)  not tested  -MS     Bed Mobility, Safety Issues  decreased use of legs for bridging/pushing;impaired trunk control for bed mobility  -MS     Assistive Device (Bed Mobility)  bed rails;head of bed elevated  -MS     Row Name 06/17/19 1316          Transfer Assessment/Treatment    Transfer Assessment/Treatment  sit-stand transfer;stand-sit transfer  -MS     Sit-Stand [place] (Transfers)  contact guard;verbal cues;nonverbal cues (demo/gesture)  -MS     Stand-Sit [place] (Transfers)  stand by assist;verbal cues;nonverbal cues (demo/gesture)  -MS     Row Name 06/17/19 1316          Sit-Stand Transfer    Assistive Device (Sit-Stand Transfers)  walker, front-wheeled  -MS     Row Name 06/17/19 1316          Stand-Sit Transfer    Assistive Device (Stand-Sit Transfers)  walker, front-wheeled  -MS     Row Name 06/17/19 1316          Gait/Stairs Assessment/Training    [place] Level (Gait)  contact guard;verbal cues;nonverbal cues (demo/gesture)  -MS     Assistive Device (Gait)  walker, front-wheeled  -MS     Distance in Feet (Gait)  30  -MS     Pattern (Gait)  step-to  -MS     Deviations/Abnormal Patterns (Gait)  addie decreased;gait speed decreased  -MS     Comment (Gait/Stairs)  Limited 2/2 fatigue- no overt LOB or safety concerns. O2 sats reading 79% post ambulation- poor wavelength initially and quickly incr to 96%- RN still notified.  -MS     Row Name 06/17/19 1316          General ROM    GENERAL ROM COMMENTS  B  LE WFL  -MS     Row Name 06/17/19 1316          MMT (Manual Muscle Testing)    General MMT Comments  B LEs grossly 4/5  -MS     Row Name 06/17/19 1316          Motor Assessment/Intervention    Additional Documentation  Balance (Group);Balance Interventions (Group)  -MS     Row Name 06/17/19 1316          Balance    Balance  static sitting balance;static standing balance  -MS     Row Name 06/17/19 1316          Static Sitting Balance    Level of [place] (Unsupported Sitting, Static Balance)  supervision  -MS     Sitting Position (Unsupported Sitting, Static Balance)  sitting on edge of bed  -MS     Row Name 06/17/19 1316          Static Standing Balance    Level of [place] (Supported Standing, Static Balance)  standby assist;contact guard assist  -MS     Assistive Device Utilized (Supported Standing, Static Balance)  walker, rolling  -MS     Row Name 06/17/19 1316          Sensory Assessment/Intervention    Sensory General Assessment  no sensation deficits identified  -MS     Row Name 06/17/19 1316          Vision Assessment/Intervention    Visual Impairment/Limitations  WFL  -MS     Row Name 06/17/19 1316          Pain Assessment    Additional Documentation  -- No pain reported.  -MS     Row Name 06/17/19 1316          Plan of Care Review    Plan of Care Reviewed With  patient  -MS     Row Name 06/17/19 1316          Physical Therapy Clinical Impression    Date of Referral to PT  06/17/19  -MS     PT Diagnosis (PT Clinical Impression)  impaired functional mobility and endurance  -MS     Criteria for Skilled Interventions Met (PT Clinical Impression)  yes;treatment indicated  -MS     Pathology/Pathophysiology Noted (Describe Specifically for Each System)  musculoskeletal  -MS     Impairments Found (describe specific impairments)  aerobic capacity/endurance;ergonomics and body mechanics;gait, locomotion, and balance;posture;ROM  -MS     Rehab Potential (PT Clinical Summary)  good, to achieve stated therapy  goals  -MS     Care Plan Review (PT)  patient/other agree to care plan  -MS     Row Name 06/17/19 1316          Vital Signs    O2 Delivery Pre Treatment  room air  -MS     Intra SpO2 (%)  80  -MS     O2 Delivery Intra Treatment  room air  -MS     Post SpO2 (%)  96  -MS     O2 Delivery Post Treatment  room air  -MS     Row Name 06/17/19 1316          Physical Therapy Goals    Bed Mobility Goal Selection (PT)  bed mobility, PT goal 1  -MS     Transfer Goal Selection (PT)  transfer, PT goal 1  -MS     Gait Training Goal Selection (PT)  gait training, PT goal 1  -MS     Row Name 06/17/19 1316          Bed Mobility Goal 1 (PT)    Activity/Assistive Device (Bed Mobility Goal 1, PT)  bed mobility activities, all  -MS     [place] Level/Cues Needed (Bed Mobility Goal 1, PT)  supervision required  -MS     Time Frame (Bed Mobility Goal 1, PT)  1 week  -MS     Progress/Outcomes (Bed Mobility Goal 1, PT)  goal ongoing  -MS     Row Name 06/17/19 1316          Transfer Goal 1 (PT)    Activity/Assistive Device (Transfer Goal 1, PT)  transfers, all;walker, rolling  -MS     [place] Level/Cues Needed (Transfer Goal 1, PT)  supervision required  -MS     Time Frame (Transfer Goal 1, PT)  1 week  -MS     Progress/Outcome (Transfer Goal 1, PT)  goal ongoing  -MS     Row Name 06/17/19 1316          Gait Training Goal 1 (PT)    Activity/Assistive Device (Gait Training Goal 1, PT)  gait (walking locomotion);walker, rolling  -MS     [place] Level (Gait Training Goal 1, PT)  supervision required  -MS     Distance (Gait Goal 1, PT)  75  -MS     Time Frame (Gait Training Goal 1, PT)  1 week  -MS     Progress/Outcome (Gait Training Goal 1, PT)  goal [place]     Row Name 06/17/19 1316          Positioning and Restraints    Pre-Treatment Position  in bed  -MS     Post Treatment Position  bed  -MS     In Bed  sitting EOB;call light within reach;encouraged to call for assist;with family/caregiver;notified nsg  -MS     Row Name  06/17/19 1316          Living Environment    Home Accessibility  wheelchair accessible  -MS       User Key  (r) = Recorded By, (t) = Taken By, (c) = Cosigned By    Initials Name Provider Type    MS JacobsGisell, PT Physical Therapist        Physical Therapy Education     Title: PT OT SLP Therapies (In Progress)     Topic: Physical Therapy (In Progress)     Point: Mobility training (Done)     Learning Progress Summary           Patient Acceptance, E, VU,NR by MS at 6/17/2019  1:33 PM                   Point: Body mechanics (Done)     Learning Progress Summary           Patient Acceptance, E, VU,NR by MS at 6/17/2019  1:33 PM                   Point: Precautions (Done)     Learning Progress Summary           Patient Acceptance, E, VU,NR by MS at 6/17/2019  1:33 PM                               User Key     Initials Effective Dates Name Provider Type Discipline    MS 03/04/19 -  Gisell Jaocbs PT Physical Therapist PT              PT Recommendation and Plan  Anticipated Discharge Disposition (PT): home with assist, home with home health  Planned Therapy Interventions (PT Eval): balance training, bed mobility training, gait training, home exercise program, patient/family education, postural re-education, ROM (range of motion), strengthening, transfer training  Therapy Frequency (PT Clinical Impression): 5 times/wk  Outcome Summary/Treatment Plan (PT)  Anticipated Discharge Disposition (PT): home with assist, home with home health  Plan of Care Reviewed With: patient, spouse  Outcome Summary: Patient is a pleasant 68 y.o. female admitted to [facility] for fever on 6/15/2019. PMHx includes COPD and wears O2 at baseline. Patient ambulates household distances at baseline with a RW and uses a motorized wc in the community. Denies any recent falls. Today, patient performed bed mobility with SV, required SBA-CGA for transfers, and ambulated 30' CGA with a RW. Strength and endurance deficits noted. Patient may benefit from  skilled PT services acutely to address functional deficits as well as improve level of independence prior to discharge. Anticipate home with possible HH PT upon DC.  Outcome Measures     Row Name 06/17/19 1300             How much help from another person do you currently need...    Turning from your back to your side while in flat bed without using bedrails?  4  -MS      Moving from lying on back to sitting on the side of a flat bed without bedrails?  3  -MS      Moving to and from a bed to a chair (including a wheelchair)?  3  -MS      Standing up from a chair using your arms (e.g., wheelchair, bedside chair)?  3  -MS      Climbing 3-5 steps with a railing?  2  -MS      To walk in hospital room?  3  -MS      AM-PAC 6 Clicks Score  18  -MS         Functional Assessment    Outcome Measure Options  AM-PAC 6 Clicks Basic Mobility (PT)  -MS        User Key  (r) = Recorded By, (t) = Taken By, (c) = Cosigned By    Initials Name Provider Type    MS Jacobs, Gisell LEDESMA, PT Physical Therapist         Time Calculation:   PT Charges     Row Name 06/17/19 1316             Time Calculation    Start Time  1127  -MS      Stop Time  1154  -MS      Time Calculation (min)  27 min  -MS      PT Received On  06/17/19  -MS      PT - Next Appointment  06/18/19  -MS      PT Goal Re-Cert Due Date  06/24/19  -MS        User Key  (r) = Recorded By, (t) = Taken By, (c) = Cosigned By    Initials Name Provider Type    MS CardenassGisell, PT Physical Therapist        Therapy Charges for Today     Code Description Service Date Service Provider Modifiers Qty    54778099957 HC PT EVAL MOD COMPLEXITY 2 6/17/2019 Gisell Jacobs, PT GP 1    [id number]  PT THER PROC EA 15 MIN 6/17/2019 Gisell Jacobs, PT GP 1          PT G-Codes  Outcome Measure Options: AM-PAC 6 Clicks Basic Mobility (PT)  AM-PAC 6 Clicks Score: 18      Gisell Jacobs PT  6/17/2019

## 2019-06-17 NOTE — NURSING NOTE
CWOCN consult for skin irritation in folds of abdomen.  Skin folds assessed and mild erythema noted in abdominal folds with moisture; no open areas.  No erythema under breasts.  Patient has home miconazole powder in room.  Will request nystatin powder for hospital use for patient today.

## 2019-06-17 NOTE — DISCHARGE SUMMARY
"                                                                  PHYSICIAN DISCHARGE SUMMARY                                                                          [facility]      Patient Identification:    Name: Alison Colvin  Age: 68 y.o.  Sex: female  :  1950  MRN: [id number]    Admit date: 6/15/2019    Discharge date 2019    Discharged Condition: Stable    Discharge Diagnoses:    Anaphylactic shock  Adrenal insufficiency  Adverse reaction to pneumococcal 23 Valent vaccination  CARRIE on CKD  A. fib on anticoagulation  KATHRIN, no longer on CPAP  Never smoker, unlikely COPD  ? Asthma on nebulized meds   Obesity  Chronic prednisone 5 mg (supposedly for asthma)    HPI \"68-year-old female who had pneumonia vaccination on Thursday.  About 24 hours later she started developing a lot of pain in her upper arm.  Thereafter she developed increasing weakness and came into the emergency room.  She was found to be hypotensive.  She eventually needed to be started on pressors.  She was started on Levophed.  Though no evidence of infection she was given a dose of ceftriaxone.  Patient states that she had similar adverse reaction to a pneumococcal vaccination 20 or so years ago.  However she is taking the Pneumovax without a problem but the most recent vaccination was the 23 Valent vaccination.  She has a recorded diagnosis of COPD though she is a never smoker.  She states that her sleep apnea was retested and has resolved.  I find this hard to believe.  She was previously admitted at this institution and had A. fib.  However patient states this has resolved as well and she is not on medications for the same.  She clearly is on warfarin and on her home medication list.  She really only takes medications for hypertension and allergies.  She does have some chronic kidney disease.  She is otherwise in fairly remarkable health.\" per     Consults:   Patient Care Team:  Nathan Shipman MD as PCP - " General (Internal Medicine)  Dillon Herron Jr., MD (Inactive) as PCP - Family Medicine  John Frank MD as PCP - Claims Attributed    Procedures Performed:         Hospital Course: Patient admitted to the hospital due to concern for persistent hypotension and shock.  This was thought to be from adverse reaction to the vaccination has no other obvious source of infection are sepsis noted.  Patient had to be placed on stress dose steroids due to some hypotension after initially being weaned off of pressors.  She has done well ever since.  She will be discharged on higher dose of prednisone at 40 mg for now which will need to be tapered slowly as an outpatient.  Her renal dysfunction got worse likely from sepsis and has now returned close to baseline.  She will need to be followed up by her new nephrologist as an outpatient for CKD management.  She was recommended to get home physical therapy but looks like she is not interested.  Otherwise she will need to be followed up by her primary care.  Her Coumadin is subtherapeutic on admission and we have increased her Coumadin dosing while in the hospital and expected to be therapeutic soon.  I do not think she needs a bridge medication at this point. She will follow-up with her primary care and continue with dosing adjustments with regards to her Coumadin as needed.  I decreased her Lasix dose as well as held her lisinopril for now which needs to be further evaluated as an outpatient regarding the need for it.      Objective:   Temp:  [97.8 °F (36.6 °C)-98.9 °F (37.2 °C)] 98 °F (36.7 °C)  Heart Rate:  [] 81  Resp:  [16-18] 16  BP: ()/(43-74) 129/74   SpO2:  [94 %-99 %] 98 %  on    Device (Oxygen Therapy): room air    Intake/Output Summary (Last 24 hours) at 6/17/2019 1510  Last data filed at 6/17/2019 0426  Gross per 24 hour   Intake --   Output 2325 ml   Net -2325 ml     Body mass index is 40.69 kg/m².      06/15/19  1711 06/15/19  2103   Weight:  106 kg (233 lb) 101 kg (222 lb 7.1 oz)     Weight change:       Physical Exam:  Constitutional: Middle aged morbidly obese white female pt in bed, No acute respiratory distress, No accessory muscle use  Head: - NCAT  Eyes: No pallor, Anicteric conjunctiva, EOMI.  ENMT:  Mallampati 4, no oral thrush. No palpable cervical lymphadenopathy, Dry MM.   Heart: RRR, no murmur. No pedal edema   Lungs: BRIJESH +, No wheezes/ crackles heard    Abdomen: Soft. No tenderness, guarding or rigidity. No palpable masses  Extremities: Extremities warm and well perfused, several skin abnormalities  Neuro: Conscious, answers appropriately, no gross focal neuro deficits  Psych: Mood and affect appropriate      Significant Discharge Diagnostics     Pertinent Lab Results:  Results from last 7 days   Lab Units 06/17/19  0541 06/16/19  0323 06/15/19  1658   SODIUM mmol/L 133* 135* 135*   POTASSIUM mmol/L 4.3 4.5 4.6   CHLORIDE mmol/L 102 101 97*   CO2 mmol/L 17.9* 20.0* 22.1   BUN mg/dL 22 26* 29*   CREATININE mg/dL 1.54* 2.38* 2.73*   GLUCOSE mg/dL 121* 70 95   CALCIUM mg/dL 9.0 8.8 9.6   AST (SGOT) U/L 80*  --  25   ALT (SGPT) U/L 31  --  12     Results from last 7 days   Lab Units 06/16/19  0323 06/15/19  2234 06/15/19  1658   TROPONIN T ng/mL 0.037* 0.040* 0.048*     Results from last 7 days   Lab Units 06/17/19  0541 06/16/19  0323 06/15/19  1658   WBC 10*3/mm3 13.42* 23.41* 28.90*   HEMOGLOBIN g/dL 9.7* 10.8* 12.5   HEMATOCRIT % 31.6* 35.3 40.4   PLATELETS 10*3/mm3 145 136* 163   MCV fL 97.5* 100.0* 97.8*   MCH pg 29.9 30.6 30.3   MCHC g/dL 30.7* 30.6* 30.9*   RDW % 14.3 14.6 14.4   RDW-SD fl 51.1 52.8 51.2   MPV fL 11.2 11.3 11.1   NEUTROPHIL % %  --   --  73.5   LYMPHOCYTE % %  --   --  15.0*   MONOCYTES % %  --   --  9.2   EOSINOPHIL % %  --   --  0.2*   BASOPHIL % %  --   --  0.4   IMM GRAN % %  --   --  1.7*   NEUTROS ABS 10*3/mm3 12.88*  --  21.25*   LYMPHS ABS 10*3/mm3  --   --  4.33*   MONOS ABS 10*3/mm3  --   --  2.66*   EOS ABS  10*3/mm3  --   --  0.06   BASOS ABS 10*3/mm3  --   --  0.12   IMMATURE GRANS (ABS) 10*3/mm3  --   --  0.48*   NRBC /100 WBC  --   --  0.2     Results from last 7 days   Lab Units 06/17/19  0541 06/16/19  0526 06/15/19  1658   INR  1.59* 1.55* 1.45*     Results from last 7 days   Lab Units 06/17/19  0541   MAGNESIUM mg/dL 2.1                 Results from last 7 days   Lab Units 06/15/19  1658   LACTATE mmol/L 1.6         Results from last 7 days   Lab Units 06/16/19  1346 06/16/19  1245 06/15/19  1659   BLOODCX  No growth at 24 hours No growth at 24 hours No growth at 24 hours  No growth at 24 hours     Results from last 7 days   Lab Units 06/15/19  1739   NITRITE UA  Negative     Results from last 7 days   Lab Units 06/16/19  0857   ADENOVIRUS DETECTION BY PCR  Not Detected   CORONAVIRUS 229E  Not Detected   CORONAVIRUS HKU1  Not Detected   CORONAVIRUS NL63  Not Detected   CORONAVIRUS OC43  Not Detected   HUMAN METAPNEUMOVIRUS  Not Detected   HUMAN RHINOVIRUS/ENTEROVIRUS  Not Detected   INFLUENZA B PCR  Not Detected   PARAINFLUENZA 1  Not Detected   PARAINFLUENZA VIRUS 2  Not Detected   PARAINFLUENZA VIRUS 3  Not Detected   PARAINFLUENZA VIRUS 4  Not Detected   BORDETELLA PERTUSSIS PCR  Not Detected   CHLAMYDOPHILA PNEUMONIAE PCR  Not Detected   MYCOPLAMA PNEUMO PCR  Not Detected   INFLUENZA A PCR  Not Detected   INFLUENZA A H3  Not Detected   INFLUENZA A H1  Not Detected   RSV, PCR  Not Detected       Imaging Results:  Imaging Results (all)     Procedure Component Value Units Date/Time    XR Chest 2 View [[id number]] Collected:  06/15/19 1901     Updated:  06/16/19 1907    Narrative:       EMERGENCY PA AND LATERAL CHEST X-RAY 06/15/2019     CLINICAL HISTORY: Fever, history of hypertension, COPD.     This is correlated to prior chest CT 06/03/2016 and chest x-ray  06/02/2016.     FINDINGS: The cardiomediastinal silhouette and the pulmonary vasculature  are within normal limits. The lungs are clear. The costophrenic  angles  are sharp.       Impression:       No active disease is seen in the chest.     This report was finalized on 6/16/2019 7:03 PM by Dr. Orlando Alvarez M.D.             Discharge Medications and Instructions:     Discharge Medications     Discharge Medications      New Medications      Instructions Start Date   nystatin [id number] UNIT/GM powder  Commonly known as:  MYCOSTATIN   Topical, Every 12 Hours Scheduled         Changes to Medications      Instructions Start Date   furosemide 40 MG tablet  Commonly known as:  LASIX  What changed:  how much to take   20 mg, Oral, Daily      predniSONE 20 MG tablet  Commonly known as:  DELTASONE  What changed:    · medication strength  · how much to take   40 mg, Oral, Daily         Continue These Medications      Instructions Start Date   allopurinol 300 MG tablet  Commonly known as:  ZYLOPRIM   300 mg, Oral, Daily      baclofen 10 MG tablet  Commonly known as:  LIORESAL   10 mg, Oral, 2 Times Daily      BROVANA 15 MCG/2ML nebulizer solution  Generic drug:  arformoterol   USE 1 VIAL PER NEBULIZER BID      budesonide 1 MG/2ML nebulizer solution  Commonly known as:  PULMICORT   Pulmicort SUSP; Patient Sig: Pulmicort SUSP USE 1 UNIT DOSE VIA NEBULIZER TWICE DAILY; 0; 25-Mar-2013; Active      calcium carbonate 600 MG tablet  Commonly known as:  OS-SEBASTIÁN   600 mg, Oral, 2 Times Daily With Meals      DULoxetine 60 MG capsule  Commonly known as:  CYMBALTA   60 mg, Oral, Daily      HYDROcodone-acetaminophen  MG per tablet  Commonly known as:  NORCO   TK 1 T PO Q 8 H PRF PAIN      ipratropium-albuterol 0.5-2.5 mg/3 ml nebulizer  Commonly known as:  DUO-NEB   USE 1 VIAL PER NEBULIZER QID      levothyroxine 50 MCG tablet  Commonly known as:  SYNTHROID, LEVOTHROID   50 mcg, Oral, Daily      metoclopramide 10 MG tablet  Commonly known as:  REGLAN   10 mg, Oral, 4 Times Daily      OXYGEN-HELIUM IN   Inhalation      pantoprazole 40 MG EC tablet  Commonly known as:  PROTONIX   40 mg, Oral,  2 Times Daily      potassium chloride 20 MEQ CR tablet  Commonly known as:  K-DUR,KLOR-CON   20 mEq, Oral, 2 Times Daily      tiZANidine 4 MG tablet  Commonly known as:  ZANAFLEX   4 mg, Oral, 2 Times Daily      topiramate 25 MG tablet  Commonly known as:  TOPAMAX   25 mg, Oral, 2 Times Daily      warfarin 5 MG tablet  Commonly known as:  COUMADIN   Alternate 5 mg with 2.5 mg every other day      WOMENS 50+ MULTI VITAMIN/MIN PO   1 tablet, Oral, Daily         Stop These Medications    lisinopril 5 MG tablet  Commonly known as:  PRINIVIL,ZESTRIL            Disposition:  Home with     Follow-up Information     Nathan Shipman MD. Schedule an appointment as soon as possible for a visit in 2 day(s).    Specialty:  Internal Medicine  Why:  plz f/u INR. DCed on high dose pred given concern for adrenal insuff. Plz taper slowly as o/p to her baseline dose; Appt made for  Thursday June 27th @ 2:30PM (earliest appt time avaiable)   Contact information:  [address] [facility] [id number] [phone]             Matthew Cervantes MD. Schedule an appointment as soon as possible for a visit in 1 month(s).    Specialty:  Nephrology  Contact information:  [address]  [address] [id number] [phone]                   Total time spent discharging patient including evaluation, medication reconciliation, arranging follow up, and post hospitalization instructions and education total time exceeds 30 minutes.    Signed:  Connor Metcalf MD  6/17/2019  3:10 PM

## 2019-06-17 NOTE — PLAN OF CARE
Problem: Patient Care Overview  Goal: Plan of Care Review   06/17/19 3416   Coping/Psychosocial   Plan of Care Reviewed With patient;spouse   OTHER   Outcome Summary Patient is a pleasant 68 y.o. female admitted to [facility] for fever on 6/15/2019. PMHx includes COPD and wears O2 at baseline. Patient ambulates household distances at baseline with a RW and uses a motorized wc in the community. Denies any recent falls. Today, patient performed bed mobility with SV, required SBA-CGA for transfers, and ambulated 30' CGA with a RW. Strength and endurance deficits noted. Patient may benefit from skilled PT services acutely to address functional deficits as well as improve level of independence prior to discharge. Anticipate home with possible HH PT upon DC.

## 2019-06-17 NOTE — PROGRESS NOTES
Discharge Planning Assessment  [facility]     Patient Name: Alison Colvin  MRN: [id number]  Today's Date: 6/17/2019    Admit Date: 6/15/2019    Discharge Needs Assessment     Row Name 06/17/19 1005       Living Environment    Lives With  spouse    Current Living Arrangements  home/apartment/condo    Primary Care Provided by  self    Provides Primary Care For  no one, unable/limited ability to care for self    Family Caregiver if Needed  spouse    Family Caregiver Names   Orlando [phone]    Quality of Family Relationships  helpful    Able to Return to Prior Arrangements  yes       Resource/Environmental Concerns    Resource/Environmental Concerns  none       Transition Planning    Patient/Family Anticipates Transition to  home with family    Patient/Family Anticipated Services at Transition  none    Transportation Anticipated  family or friend will provide       Discharge Needs Assessment    Readmission Within the Last 30 Days  no previous admission in last 30 days    Concerns to be Addressed  denies needs/concerns at this time    Equipment Currently Used at Home  wheelchair, motorized;walker, standard;grab bar;shower chair;oxygen;nebulizer    Anticipated Changes Related to Illness  none    Equipment Needed After Discharge  none        Discharge Plan     Row Name 06/17/19 1006       Plan    Plan  Return home with spouse    Patient/Family in Agreement with Plan  yes    Plan Comments  Spoke with patient at bedside.  Patient lives with  Orlando [phone].  Patient is IADL, she uses an electric W/C, has a walker, grab bars in the BR, shower chair, O2 from [place] and a nebulizer.  She has use HH in the past but does not remember what agency, she has been to [facility] for rehab.  Patient plans to return home at [place], at present, she does not feel that HH would be necessary.  CCP will follow.  BHumeniuk RN          Demographic Summary     Row Name 06/17/19 1004       General Information    Admission  Type  inpatient    Arrived From  home    Referral Source  admission list    Reason for Consult  discharge planning    Preferred Language  English     Used During This Interaction  no        Functional Status     Row Name 06/17/19 1004       Functional Status    Usual Activity Tolerance  fair    Current Activity Tolerance  fair       Functional Status, IADL    Medications  independent    Meal Preparation  assistive person    Housekeeping  assistive person    Laundry  assistive person    Shopping  assistive person       Mental Status    General Appearance WDL  WDL       Mental Status Summary    Recent Changes in Mental Status/Cognitive Functioning  no changes              Becky S. Humeniuk, RN

## 2019-06-18 ENCOUNTER — READMISSION MANAGEMENT (OUTPATIENT)
Dept: CALL CENTER | Facility: HOSPITAL | Age: 69
End: 2019-06-18

## 2019-06-18 LAB
IGA SERPL-MCNC: 174 MG/DL (ref 87–352)
IGG SERPL-MCNC: 540 MG/DL (ref 700–1600)
IGM SERPL-MCNC: 99 MG/DL (ref 26–217)
PROT PATTERN SERPL IFE-IMP: ABNORMAL

## 2019-06-18 NOTE — PROGRESS NOTES
Case Management Discharge Note    Final Note: Patient was Dc'd home 6/17.         Transportation Services  Private: Car    Final Discharge Disposition Code: 01 - home or self-care

## 2019-06-18 NOTE — OUTREACH NOTE
Prep Survey      Responses   Facility patient discharged from?  [facility]   Is patient eligible?  Yes   Discharge diagnosis  Anaphylactic shock   Does the patient have one of the following disease processes/diagnoses(primary or secondary)?  Other   Prep survey completed?  Yes          Marge Forte RN

## 2019-06-19 ENCOUNTER — READMISSION MANAGEMENT (OUTPATIENT)
Dept: CALL CENTER | Facility: HOSPITAL | Age: 69
End: 2019-06-19

## 2019-06-19 NOTE — OUTREACH NOTE
Medical Week 1 Survey      Responses   Facility patient discharged from?  [facility]   Does the patient have one of the following disease processes/diagnoses(primary or secondary)?  Other   Is there a successful TCM telephone encounter documented?  No   Week 1 attempt successful?  Yes   Call start time  1702   Call end time  1705   Discharge diagnosis  Anaphylactic shock   Is patient permission given to speak with other caregiver?  Yes   List who call center can speak with  , Orlando Mc reviewed with patient/caregiver?  Yes   Is the patient having any side effects they believe may be caused by any medication additions or changes?  No   Does the patient have all medications ordered at discharge?  Yes   Is the patient taking all medications as directed (includes completed medication regime)?  Yes   Does the patient have a primary care provider?   Yes   Does the patient have an appointment with their PCP within 7 days of discharge?  Yes   Has the patient kept scheduled appointments due by today?  N/A   Has home health visited the patient within 72 hours of discharge?  N/A   Psychosocial issues?  No   Did the patient receive a copy of their discharge instructions?  Yes   Nursing interventions  Reviewed instructions with patient   What is the patient's perception of their health status since discharge?  Improving   Is the patient/caregiver able to teach back signs and symptoms related to disease process for when to call PCP?  Yes   Is the patient/caregiver able to teach back signs and symptoms related to disease process for when to call 911?  Yes   Is the patient/caregiver able to teach back the hierarchy of who to call/visit for symptoms/problems? PCP, Specialist, Home health nurse, Urgent Care, ED, 911  Yes   Week 1 call completed?  Yes   Wrap up additional comments  Tires easily. Kidneys working well. no fever.  Had to take her dog to the vet today for cancer/operation, she is worn out.           Valeria SIMMONS  MORIS Anderson

## 2019-06-20 LAB
BACTERIA SPEC AEROBE CULT: NORMAL
BACTERIA SPEC AEROBE CULT: NORMAL

## 2019-06-21 LAB
BACTERIA SPEC AEROBE CULT: NORMAL
BACTERIA SPEC AEROBE CULT: NORMAL

## 2019-06-27 ENCOUNTER — OFFICE VISIT (OUTPATIENT)
Dept: FAMILY MEDICINE CLINIC | Facility: CLINIC | Age: 69
End: 2019-06-27

## 2019-06-27 ENCOUNTER — TRANSITIONAL CARE MANAGEMENT TELEPHONE ENCOUNTER (OUTPATIENT)
Dept: FAMILY MEDICINE CLINIC | Facility: CLINIC | Age: 69
End: 2019-06-27

## 2019-06-27 VITALS
HEIGHT: 62 IN | OXYGEN SATURATION: 97 % | SYSTOLIC BLOOD PRESSURE: 110 MMHG | HEART RATE: 74 BPM | RESPIRATION RATE: 17 BRPM | BODY MASS INDEX: 40.69 KG/M2 | TEMPERATURE: 98 F | DIASTOLIC BLOOD PRESSURE: 62 MMHG

## 2019-06-27 DIAGNOSIS — G62.89 OTHER POLYNEUROPATHY: ICD-10-CM

## 2019-06-27 DIAGNOSIS — I48.0 PAF (PAROXYSMAL ATRIAL FIBRILLATION) (HCC): ICD-10-CM

## 2019-06-27 DIAGNOSIS — M79.7 FIBROMYALGIA: ICD-10-CM

## 2019-06-27 DIAGNOSIS — I10 BENIGN ESSENTIAL HYPERTENSION: Primary | ICD-10-CM

## 2019-06-27 PROCEDURE — 99214 OFFICE O/P EST MOD 30 MIN: CPT | Performed by: INTERNAL MEDICINE

## 2019-06-27 NOTE — PROGRESS NOTES
Subjective   Alison Colvin is a 68 y.o. female. Patient is here today for hospital follow-up for an admission on [facility] 15 for fever and hypotension.  No source for sepsis was identified and it was felt that the patient probably had a severe reaction to a pneumonia shot.  She was sent home on higher dose steroids, her lisinopril was discontinued and her Lasix was decreased to 20 mg daily.  Since discharge the patient's been doing well.  She is back on her usual prednisone dose, not having any increased edema on the lower dose furosemide and basically feeling fine and back to normal.  Chief Complaint   Patient presents with   • Follow-up     hospital follow up       (Not on file)-  Risk for Readmission (LACE) Score: 11 (6/17/2019  6:00 AM)           Vitals:    06/27/19 1455   BP: 110/62   Pulse: 74   Resp: 17   Temp: 98 °F (36.7 °C)   SpO2: 97%     The following portions of the patient's history were reviewed and updated as appropriate: allergies, current medications, past family history, past medical history, past social history, past surgical history and problem list.    Past Medical History:   Diagnosis Date   • Abnormal nuclear stress test    • Acute sinusitis    • Allergy    • Benign essential hypertension    • Cellulitis    • Chest pain    • Colon cancer (CMS/HCC)    • COPD (chronic obstructive pulmonary disease) (CMS/HCC)    • Disease of thyroid gland    • Edema of leg    • Epilepsy (CMS/HCC)    • Esophageal reflux    • Foot pain    • Myalgia and myositis    • Onychomycosis of toenail    • Transient cerebral ischemia    • URI (upper respiratory infection)       Allergies   Allergen Reactions   • Pneumococcal Vaccines Delirium     Fever, chills   • Asa [Aspirin]    • Levofloxacin    • Meperidine Other (See Comments)   • Naproxen    • Pregabalin Other (See Comments)   • Propoxyphene    • Sulfa Antibiotics Other (See Comments)      Social History     Socioeconomic History   • Marital status:      Spouse  name: Not on file   • Number of children: Not on file   • Years of education: Not on file   • Highest education level: Not on file   Tobacco Use   • Smoking status: Never Smoker   • Smokeless tobacco: Never Used   Substance and Sexual Activity   • Alcohol use: No   • Drug use: No        Current Outpatient Medications:   •  allopurinol (ZYLOPRIM) 300 MG tablet, Take 1 tablet by mouth Daily., Disp: 90 tablet, Rfl: 3  •  baclofen (LIORESAL) 10 MG tablet, Take 1 tablet by mouth 2 (Two) Times a Day., Disp: 180 tablet, Rfl: 1  •  BROVANA 15 MCG/2ML nebulizer solution, USE 1 VIAL PER NEBULIZER BID, Disp: , Rfl: 5  •  budesonide (PULMICORT) 1 MG/2ML nebulizer solution, Pulmicort SUSP; Patient Sig: Pulmicort SUSP USE 1 UNIT DOSE VIA NEBULIZER TWICE DAILY; 0; 25-Mar-2013; Active, Disp: , Rfl:   •  calcium carbonate (OS-SEBASTIÁN) 600 MG tablet, Take 600 mg by mouth 2 (two) times a day with meals., Disp: , Rfl:   •  DULoxetine (CYMBALTA) 60 MG capsule, Take 1 capsule by mouth Daily., Disp: 90 capsule, Rfl: 3  •  furosemide (LASIX) 40 MG tablet, Take 0.5 tablets by mouth Daily., Disp: 30 tablet, Rfl: 0  •  HYDROcodone-acetaminophen (NORCO)  MG per tablet, TK 1 T PO Q 8 H PRF PAIN, Disp: , Rfl: 0  •  ipratropium-albuterol (DUO-NEB) 0.5-2.5 mg/mL nebulizer, USE 1 VIAL PER NEBULIZER QID, Disp: , Rfl: 5  •  levothyroxine (SYNTHROID, LEVOTHROID) 50 MCG tablet, Take 1 tablet by mouth Daily., Disp: 90 tablet, Rfl: 3  •  metoclopramide (REGLAN) 10 MG tablet, Take 1 tablet by mouth 4 (Four) Times a Day., Disp: 360 tablet, Rfl: 3  •  Multiple Vitamins-Minerals (WOMENS 50+ MULTI VITAMIN/MIN PO), Take 1 tablet by mouth daily., Disp: , Rfl:   •  nystatin (MYCOSTATIN) 570236 UNIT/GM powder, Apply  topically to the appropriate area as directed Every 12 (Twelve) Hours., Disp: 15 g, Rfl: 0  •  OXYGEN-HELIUM IN, Inhale., Disp: , Rfl:   •  pantoprazole (PROTONIX) 40 MG EC tablet, Take 1 tablet by mouth 2 (Two) Times a Day., Disp: 180 tablet, Rfl:  3  •  potassium chloride (K-DUR,KLOR-CON) 20 MEQ CR tablet, Take 1 tablet by mouth 2 (Two) Times a Day., Disp: 180 tablet, Rfl: 3  •  tiZANidine (ZANAFLEX) 4 MG tablet, Take 1 tablet by mouth 2 (Two) Times a Day., Disp: 180 tablet, Rfl: 1  •  topiramate (TOPAMAX) 25 MG tablet, Take 1 tablet by mouth 2 (Two) Times a Day., Disp: 180 tablet, Rfl: 3  •  warfarin (COUMADIN) 5 MG tablet, Alternate 5 mg with 2.5 mg every other day, Disp: 90 tablet, Rfl: 3     Objective     History of Present Illness     Review of Systems   Constitutional: Negative.    HENT: Negative.    Eyes: Negative.    Respiratory: Negative.    Cardiovascular: Negative.    Gastrointestinal: Negative.    Genitourinary: Negative.    Musculoskeletal: Negative.    Skin: Negative.    Neurological: Negative.    Psychiatric/Behavioral: Negative.        Physical Exam   Constitutional: She appears well-developed and well-nourished.   Pleasant, cooperative no distress, blood pressure 120/80   HENT:   Head: Normocephalic and atraumatic.   Eyes: Conjunctivae are normal. Pupils are equal, round, and reactive to light. No scleral icterus.   Neck: Normal range of motion. Neck supple.   Cardiovascular: Normal rate, regular rhythm and normal heart sounds.   Pulmonary/Chest: Effort normal and breath sounds normal. No respiratory distress. She has no wheezes. She has no rales.   Musculoskeletal: Normal range of motion. She exhibits no edema.   Neurological: She is alert.   Skin: Skin is warm and dry.   Psychiatric: She has a normal mood and affect. Her behavior is normal.   Nursing note and vitals reviewed.      ASSESSMENT patient seems back to baseline.  Her blood pressure is normal off the lisinopril currently.  She has no significant edema.  Her lungs sound quite clear.  #1-resolved abnormal hypotensive reaction to pneumonia vaccination     Problem List Items Addressed This Visit     None          Current outpatient and discharge medications have been reconciled for  the patient.  Reviewed by: Nathan Shipman MD      PLAN the patient will continue off the lisinopril and on the lower dose furosemide 20 mg daily.  She will continue other medications as now and will monitor protimes at home.  I would like to recheck her in 4 months with a CBC, CMP, lipid panel, TSH and uric acid level and hemoglobin A1c and hepatitis C screen per protocol    There are no Patient Instructions on file for this visit.  Return in about 4 months (around 10/27/2019) for with labs.

## 2019-06-28 ENCOUNTER — READMISSION MANAGEMENT (OUTPATIENT)
Dept: CALL CENTER | Facility: HOSPITAL | Age: 69
End: 2019-06-28

## 2019-06-28 NOTE — OUTREACH NOTE
Medical Week 2 Survey      Responses   Facility patient discharged from?  [facility]   Does the patient have one of the following disease processes/diagnoses(primary or secondary)?  Other   Week 2 attempt successful?  Yes   Call start time  0833   Discharge diagnosis  Anaphylactic shock   Call end time  0836   Meds reviewed with patient/caregiver?  Yes   Is the patient taking all medications as directed (includes completed medication regime)?  Yes   Has the patient kept scheduled appointments due by today?  Yes   Comments  Saw Dr Shipman yesterday 06/27/2019   Psychosocial issues?  No   What is the patient's perception of their health status since discharge?  Returned to baseline/stable   Week 2 Call Completed?  Yes   Graduated  Yes   Did the patient feel the follow up calls were helpful during their recovery period?  Yes   Was the number of calls appropriate?  Yes   Graduated/Revoked comments  Returned to baseline. aware of nurse call line.          Jo Garcia RN

## 2019-07-24 ENCOUNTER — TELEPHONE (OUTPATIENT)
Dept: FAMILY MEDICINE CLINIC | Facility: CLINIC | Age: 69
End: 2019-07-24

## 2019-07-24 NOTE — TELEPHONE ENCOUNTER
SPOKE TO PATIENT AND INFORMED HER OF DR. DAILEY'S INSTRUCTIONS.  SHE EXPRESSED UNDERSTANDING   ----- Message from Jill Hanley MA sent at 7/24/2019  9:50 AM EDT -----      ----- Message -----  From: Nathan Dailey MD  Sent: 7/24/2019   9:15 AM  To: Jill Hanley MA    Her INR was low.  She could take an extra Coumadin today and then stay on the same dose      ----- Message -----  From: Jill Hanley MA  Sent: 7/24/2019   8:35 AM  To: MD DR. TARUN Adair,    PLEASE SEE BELOW AND ADVISE. THANK YOU.      ----- Message -----  From: Nicole Campos  Sent: 7/23/2019  12:41 PM  To: MIAN Louise WITH  MD INR   [phone]  Calling with an Out of range INR    1.7 FOR TODAY     PLEASE GET IN TOUCH WITH PATIENT REGARDING THIS   [phone]

## 2019-07-25 RX ORDER — FUROSEMIDE 40 MG/1
40 TABLET ORAL DAILY
Qty: 90 TABLET | Refills: 1 | Status: SHIPPED | OUTPATIENT
Start: 2019-07-25 | End: 2019-10-24 | Stop reason: SDUPTHER

## 2019-07-25 RX ORDER — TIZANIDINE 4 MG/1
TABLET ORAL
Qty: 180 TABLET | Refills: 1 | Status: SHIPPED | OUTPATIENT
Start: 2019-07-25 | End: 2020-01-13

## 2019-07-25 RX ORDER — DULOXETIN HYDROCHLORIDE 60 MG/1
60 CAPSULE, DELAYED RELEASE ORAL DAILY
Qty: 90 CAPSULE | Refills: 1 | Status: SHIPPED | OUTPATIENT
Start: 2019-07-25 | End: 2020-03-13

## 2019-07-25 RX ORDER — POTASSIUM CHLORIDE 20 MEQ/1
TABLET, EXTENDED RELEASE ORAL
Qty: 180 TABLET | Refills: 1 | Status: SHIPPED | OUTPATIENT
Start: 2019-07-25 | End: 2020-03-13

## 2019-07-25 RX ORDER — PREDNISONE 1 MG/1
5 TABLET ORAL DAILY
Qty: 90 TABLET | Refills: 1 | Status: SHIPPED | OUTPATIENT
Start: 2019-07-25 | End: 2020-01-13

## 2019-07-25 RX ORDER — BACLOFEN 10 MG/1
TABLET ORAL
Qty: 180 TABLET | Refills: 1 | Status: SHIPPED | OUTPATIENT
Start: 2019-07-25 | End: 2020-01-13

## 2019-07-25 RX ORDER — ALLOPURINOL 300 MG/1
300 TABLET ORAL DAILY
Qty: 90 TABLET | Refills: 3 | Status: SHIPPED | OUTPATIENT
Start: 2019-07-25 | End: 2020-09-16

## 2019-07-25 RX ORDER — LEVOTHYROXINE SODIUM 0.05 MG/1
50 TABLET ORAL DAILY
Qty: 90 TABLET | Refills: 1 | Status: SHIPPED | OUTPATIENT
Start: 2019-07-25 | End: 2020-03-13

## 2019-08-08 ENCOUNTER — TELEPHONE (OUTPATIENT)
Dept: FAMILY MEDICINE CLINIC | Facility: CLINIC | Age: 69
End: 2019-08-08

## 2019-08-08 NOTE — TELEPHONE ENCOUNTER
S/W PT AND ADVISED WHAT DR. DAILEY SAID. PT VOICED UNDERSTANDING.     ----- Message from Nathan Dailey MD sent at 8/6/2019 11:47 AM EDT -----  Have her hold her Coumadin for 2 days and then recheck the pro time and INR and resume Coumadin if it is down between 2 and 3      ----- Message -----  From: Jill Hanley MA  Sent: 8/6/2019   8:40 AM  To: MD DR. TARUN Adair,    PLEASE SEE BELOW AND ADVISE. THANK YOU.      ----- Message -----  From: Nicole Campos  Sent: 8/6/2019   8:22 AM  To: Jill Hanley MA    Critical INR  results from md inr     5.3 this am     Please call pt to advise  [phone]

## 2019-08-20 ENCOUNTER — TELEPHONE (OUTPATIENT)
Dept: FAMILY MEDICINE CLINIC | Facility: CLINIC | Age: 69
End: 2019-08-20

## 2019-08-20 NOTE — TELEPHONE ENCOUNTER
CALLED AND S/W PT AND ADVISED WHAT DR. DAILEY SAID. PT VOICED UNDERSTANDING.       ----- Message from Nathan Dailey MD sent at 8/20/2019  1:42 PM EDT -----  Hold the Coumadin for the next 2 days, then resume her regular dose and recheck it 2 to 3 days later      ----- Message -----  From: Jill Hanley MA  Sent: 8/20/2019   1:22 PM  To: MD DR. TARUN Adair,    PLEASE SEE BELOW AND ADVISE. THANK YOU.      ----- Message -----  From: Sj Kerr MA  Sent: 8/20/2019  12:57 PM  To: Jill Hanley MA MD INR CALLED.     PATIENT'S INR WAS 5.3 TODAY.    PLEASE CALL PATIENT WITH INSTRUCTIONS.     THANK YOU

## 2019-09-03 ENCOUNTER — TELEPHONE (OUTPATIENT)
Dept: FAMILY MEDICINE CLINIC | Facility: CLINIC | Age: 69
End: 2019-09-03

## 2019-09-03 NOTE — TELEPHONE ENCOUNTER
CALLED AND S/W PT AND ADVISED WHAT DR. DAILEY SAID. PT VOICED UNDERSTANDING.     ----- Message from Nathan Dailey MD sent at 9/3/2019  1:25 PM EDT -----  Her pro time test is high.  I want her to hold the Coumadin for a day and then I want her to go on 2.5 mg 5 days a week with 5 mg on 2 days such as Monday and Friday.  Keep checking her INR weekly        ----- Message -----  From: Gene Scott Incoming  Sent: 9/3/2019   1:14 PM  To: Nathan Dailey MD

## 2019-09-18 ENCOUNTER — TELEPHONE (OUTPATIENT)
Dept: FAMILY MEDICINE CLINIC | Facility: CLINIC | Age: 69
End: 2019-09-18

## 2019-09-18 NOTE — TELEPHONE ENCOUNTER
CALLED AND S/W PT AND ADVISED WHAT DR. DAILEY SAID. PT VOICED UNDERSTANDING.       ----- Message from Nathan Dailey MD sent at 9/17/2019  4:13 PM EDT -----  Contact: CHATA WITH MD/INR  Her INR was high and she needs to hold the Coumadin today and tomorrow and then recheck INR and let us know what it is      ----- Message -----  From: Jill Hanley MA  Sent: 9/17/2019   1:14 PM  To: MD CHATA Adair WITH MD/INR CALLING WITH PATIENTS PROTIME RESULTS:     INR: 4.2 - TAKEN TODAY     PLEASE ADVISE.

## 2019-09-20 ENCOUNTER — TELEPHONE (OUTPATIENT)
Dept: FAMILY MEDICINE CLINIC | Facility: CLINIC | Age: 69
End: 2019-09-20

## 2019-09-20 RX ORDER — WARFARIN SODIUM 4 MG/1
4 TABLET ORAL AS NEEDED
Qty: 30 TABLET | Refills: 3 | Status: SHIPPED | OUTPATIENT
Start: 2019-09-20 | End: 2019-11-10 | Stop reason: SDUPTHER

## 2019-09-20 NOTE — TELEPHONE ENCOUNTER
S/W PT AND ADVISED WHAT DR. DAILEY SAID. PT VOICED UNDERSTANDING. RX FOR WARFARIN 4MG HAS BEEN SENT TO [facility] FOR PATIENT.       ----- Message from Nathan Dailey MD sent at 9/20/2019 11:20 AM EDT -----  I would like to get her Coumadin and the 4 mg size and have her decrease to 4 mg on Monday and Friday and 2 mg on the other days.  She should recheck her pro time weekly as she does now and let us know.        ----- Message -----  From: Jill Hanley MA  Sent: 9/20/2019  10:56 AM  To: Nathan Dailey MD    PATIENT IS TAKING 5MG ON Monday AND Friday AND 2MG ALL OTHER DAYS.      ----- Message -----  From: Nathan Dailey MD  Sent: 9/19/2019   4:09 PM  To: Jill Hanley MA    The INR is good today.  I need to know her dose that she is been taking as will need to decrease it just a little bit.          ----- Message -----  From: Jill Hanley MA  Sent: 9/19/2019   2:33 PM  To: MD DR. TARUN Adair,    PLEASE SEE BELOW AND ADVISE. THANK YOU.      ----- Message -----  From: Marjorie Huber  Sent: 9/19/2019   2:29 PM  To: Jill Hanley MA    PT JUST TOOK HER INR AND IT WAS 2.7. SHE WAS TOLD TO TAKE IT AND LET DR DAILEY KNOW    [phone] PT'S NUMBER    THANK YOU

## 2019-10-02 ENCOUNTER — TELEPHONE (OUTPATIENT)
Dept: FAMILY MEDICINE CLINIC | Facility: CLINIC | Age: 69
End: 2019-10-02

## 2019-10-02 NOTE — TELEPHONE ENCOUNTER
CALLED AND S/W PT AND ADVISED WHAT DR. DAILEY SAID. PT VOICED UNDERSTANDING.     ----- Message from Nathan Dailey MD sent at 10/2/2019  9:31 AM EDT -----  INR is good.  Continue the current Coumadin dose  ----- Message -----  From: Jill Hanley MA  Sent: 10/2/2019   9:13 AM  To: Nathan Dailey MD    PATIENT HAS BEEN TAKING 4MG ON Monday, Wednesday, AND Friday AND 2MG ALL OTHER DAYS.       ----- Message -----  From: Morena Ramirez  Sent: 10/1/2019   2:22 PM  To: Jill Hanley MA MD INR CALLING WITH AN OUT OF RANGE INR.    INR: 2.2 - TAKEN TODAY 10/01/19    THANK YOU

## 2019-10-04 ENCOUNTER — OFFICE VISIT (OUTPATIENT)
Dept: FAMILY MEDICINE CLINIC | Facility: CLINIC | Age: 69
End: 2019-10-04

## 2019-10-04 VITALS
HEIGHT: 62 IN | HEART RATE: 82 BPM | DIASTOLIC BLOOD PRESSURE: 68 MMHG | RESPIRATION RATE: 18 BRPM | OXYGEN SATURATION: 95 % | WEIGHT: 230 LBS | BODY MASS INDEX: 42.33 KG/M2 | TEMPERATURE: 98.8 F | SYSTOLIC BLOOD PRESSURE: 106 MMHG

## 2019-10-04 DIAGNOSIS — Z23 NEED FOR INFLUENZA VACCINATION: Primary | ICD-10-CM

## 2019-10-04 DIAGNOSIS — Z00.00 MEDICARE ANNUAL WELLNESS VISIT, SUBSEQUENT: ICD-10-CM

## 2019-10-04 PROCEDURE — G0008 ADMIN INFLUENZA VIRUS VAC: HCPCS | Performed by: NURSE PRACTITIONER

## 2019-10-04 PROCEDURE — 90653 IIV ADJUVANT VACCINE IM: CPT | Performed by: NURSE PRACTITIONER

## 2019-10-04 PROCEDURE — G0439 PPPS, SUBSEQ VISIT: HCPCS | Performed by: NURSE PRACTITIONER

## 2019-10-04 NOTE — PROGRESS NOTES
The ABCs of the Annual Wellness Visit  Subsequent Medicare Wellness Visit    Chief Complaint   Patient presents with   • Medicare Wellness-Initial Visit       Subjective   History of Present Illness:  Alison Colvin is a 69 y.o. female who presents for a Subsequent Medicare Wellness Visit.    HEALTH RISK ASSESSMENT    Recent Hospitalizations:  Recently treated at the following:  [facility]    Current Medical Providers:  Patient Care Team:  Nathan Shipman MD as PCP - General (Internal Medicine)  Dillon Herron Jr., MD (Inactive) as PCP - Family Medicine  Nathan Shipman MD as PCP - Claims Attributed    Smoking Status:  Social History     Tobacco Use   Smoking Status Never Smoker   Smokeless Tobacco Never Used       Alcohol Consumption:  Social History     Substance and Sexual Activity   Alcohol Use No       Depression Screen:   PHQ-2/PHQ-9 Depression Screening 10/4/2019   Little interest or pleasure in doing things 0   Feeling down, depressed, or hopeless 0   Total Score 0       Fall Risk Screen:  DESI Fall Risk Assessment was completed, and patient is at LOW risk for falls.Assessment completed on:10/4/2019    Health Habits and Functional and Cognitive Screening:  Functional & Cognitive Status 10/4/2019   Do you have difficulty preparing food and eating? Yes   Do you have difficulty bathing yourself, getting dressed or grooming yourself? No   Do you have difficulty using the toilet? No   Do you have difficulty moving around from place to place? Yes   Do you have trouble with steps or getting out of a bed or a chair? Yes   Current Diet Well Balanced Diet   Dental Exam Up to date   Eye Exam Up to date   Exercise (times per week) 0 times per week   Current Exercise Activities Include None   Do you need help using the phone?  No   Are you deaf or do you have serious difficulty hearing?  No   Do you need help with transportation? Yes   Do you need help shopping? Yes   Do you need help preparing  meals?  Yes   Do you need help with housework?  Yes   Do you need help with laundry? Yes   Do you need help taking your medications? No   Do you need help managing money? No   Do you ever drive or ride in a car without wearing a seat belt? No   Have you felt unusual stress, anger or loneliness in the last month? No   Who do you live with? Spouse   If you need help, do you have trouble finding someone available to you? No   Have you been bothered in the last four weeks by sexual problems? No   Do you have difficulty concentrating, remembering or making decisions? No         Does the patient have evidence of cognitive impairment? No    Asprin use counseling:Does not need ASA (and currently is not on it)    Age-appropriate Screening Schedule:  Refer to the list below for future screening recommendations based on patient's age, sex and/or medical conditions. Orders for these recommended tests are listed in the plan section. The patient has been provided with a written plan.    Health Maintenance   Topic Date Due   • ZOSTER VACCINE (1 of 2) 09/28/1998   • INFLUENZA VACCINE  08/01/2019   • LIPID PANEL  05/28/2020   • MAMMOGRAM  10/22/2020   • TDAP/TD VACCINES (2 - Td) 01/25/2029   • PNEUMOCOCCAL VACCINES (65+ LOW/MEDIUM RISK)  Completed   • COLONOSCOPY  Discontinued          The following portions of the patient's history were reviewed and updated as appropriate: allergies, current medications, past family history, past medical history, past social history, past surgical history and problem list.    Outpatient Medications Prior to Visit   Medication Sig Dispense Refill   • allopurinol (ZYLOPRIM) 300 MG tablet TAKE 1 TABLET BY MOUTH  DAILY 90 tablet 3   • baclofen (LIORESAL) 10 MG tablet TAKE 1 TABLET BY MOUTH TWO  TIMES DAILY 180 tablet 1   • BROVANA 15 MCG/2ML nebulizer solution USE 1 VIAL PER NEBULIZER BID  5   • budesonide (PULMICORT) 1 MG/2ML nebulizer solution Pulmicort SUSP; Patient Sig: Pulmicort SUSP USE 1 UNIT  DOSE VIA NEBULIZER TWICE DAILY; 0; 25-Mar-2013; Active     • calcium carbonate (OS-SEBASTIÁN) 600 MG tablet Take 600 mg by mouth 2 (two) times a day with meals.     • DULoxetine (CYMBALTA) 60 MG capsule TAKE 1 CAPSULE BY MOUTH  DAILY 90 capsule 1   • furosemide (LASIX) 40 MG tablet Take 0.5 tablets by mouth Daily. 30 tablet 0   • furosemide (LASIX) 40 MG tablet TAKE 1 TABLET BY MOUTH  DAILY 90 tablet 1   • HYDROcodone-acetaminophen (NORCO)  MG per tablet TK 1 T PO Q 8 H PRF PAIN  0   • ipratropium-albuterol (DUO-NEB) 0.5-2.5 mg/mL nebulizer USE 1 VIAL PER NEBULIZER QID  5   • levothyroxine (SYNTHROID, LEVOTHROID) 50 MCG tablet TAKE 1 TABLET BY MOUTH  DAILY 90 tablet 1   • metoclopramide (REGLAN) 10 MG tablet Take 1 tablet by mouth 4 (Four) Times a Day. 360 tablet 3   • Multiple Vitamins-Minerals (WOMENS 50+ MULTI VITAMIN/MIN PO) Take 1 tablet by mouth daily.     • nystatin (MYCOSTATIN) 441382 UNIT/GM powder Apply  topically to the appropriate area as directed Every 12 (Twelve) Hours. 15 g 0   • OXYGEN-HELIUM IN Inhale.     • pantoprazole (PROTONIX) 40 MG EC tablet Take 1 tablet by mouth 2 (Two) Times a Day. 180 tablet 3   • potassium chloride (K-DUR,KLOR-CON) 20 MEQ CR tablet TAKE 1 TABLET BY MOUTH TWO  TIMES DAILY 180 tablet 1   • predniSONE (DELTASONE) 5 MG tablet TAKE 1 TABLET BY MOUTH  DAILY 90 tablet 1   • tiZANidine (ZANAFLEX) 4 MG tablet TAKE 1 TABLET BY MOUTH TWO  TIMES DAILY 180 tablet 1   • topiramate (TOPAMAX) 25 MG tablet Take 1 tablet by mouth 2 (Two) Times a Day. 180 tablet 3   • warfarin (COUMADIN) 4 MG tablet Take 1 tablet by mouth As Needed (TAKE ON MONDAY AND FRIDAY). TAKE 1 TABLET BY MOUTH ON Monday AND Friday 30 tablet 3   • warfarin (COUMADIN) 5 MG tablet Alternate 5 mg with 2.5 mg every other day 90 tablet 3     No facility-administered medications prior to visit.        Patient Active Problem List   Diagnosis   • Anemia of chronic disorder   • Asthma   • Benign essential hypertension   •  "Chronic venous insufficiency   • Depression   • Degeneration of intervertebral disc of lumbosacral region   • Duodenal ulcer   • Gastroesophageal reflux disease   • Fibromyalgia   • Gastric ulcer   • Gastroparesis   • Hypothyroidism   • Migraine   • Peripheral neuropathy   • Restrictive lung disease   • Upper gastrointestinal hemorrhage   • Urinary incontinence   • Osteoarthritis of both hands   • Gout with tophus   • PAF (paroxysmal atrial fibrillation) (CMS/[facility])   • History of pulmonary embolism   • History of deep vein thrombophlebitis of lower extremity   • History of inferior vena caval filter placement   • Inferior vena cava occlusion (CMS/[facility])   • Chronic intermittent hypoxia with obstructive sleep apnea   • Chronic renal impairment, stage 3 (moderate) (CMS/[facility])   • Nocturnal hypoxia   • Obstructive sleep apnea syndrome   • Fever       Advanced Care Planning:  Patient has an advance directive - a copy has not been provided. Have asked the patient to send this to us to add to record    Review of Systems    Compared to one year ago, the patient feels her physical health is the same.  Compared to one year ago, the patient feels her mental health is the same.    Reviewed chart for potential of high risk medication in the elderly: yes  Reviewed chart for potential of harmful drug interactions in the elderly:yes    Objective         Vitals:    10/04/19 1312   BP: 106/68   Pulse: 82   Resp: 18   Temp: 98.8 °F (37.1 °C)   TempSrc: Oral   SpO2: 95%   Weight: 104 kg (230 lb)   Height: 157.5 cm (62\")   PainSc:   8   PainLoc: Back       Body mass index is 42.07 kg/m².  Discussed the patient's BMI with her. The BMI is above average; no BMI management plan is appropriate..    Physical Exam          Assessment/Plan   Medicare Risks and Personalized Health Plan  CMS Preventative Services Quick Reference  Cardiovascular risk  Fall Risk  Immunizations Discussed/Encouraged (specific immunizations; Influenza " )  Obesity/Overweight     The above risks/problems have been discussed with the patient.  Pertinent information has been shared with the patient in the After Visit Summary.  Follow up plans and orders are seen below in the Assessment/Plan Section.    Diagnoses and all orders for this visit:    1. Need for influenza vaccination (Primary)  -     Fluad Quad >65 years    2. Medicare annual wellness visit, subsequent      Follow Up:  Return for Next scheduled follow up.     An After Visit Summary and PPPS were given to the patient.

## 2019-10-10 ENCOUNTER — TELEPHONE (OUTPATIENT)
Dept: FAMILY MEDICINE CLINIC | Facility: CLINIC | Age: 69
End: 2019-10-10

## 2019-10-10 NOTE — TELEPHONE ENCOUNTER
CALLED AND S/W PT AND ADVISED WHAT DR. DAILEY SAID. PT VOICED UNDERSTANDING.       ----- Message from Nathan Dailey MD sent at 10/10/2019 11:50 AM EDT -----  She will probably need to stop the Coumadin about 4 days prior to her oral surgery and should be able to restart it the day after  ----- Message -----  From: Jill Hanley MA  Sent: 10/10/2019   9:49 AM  To: MD DR. TARUN Adair,    PLEASE SEE BELOW AND ADVISE. THANK YOU.      ----- Message -----  From: Marjorie Huber  Sent: 10/9/2019   1:58 PM  To: Jill Hanley MA    PT IS HAVING ORAL SURGERY ON WEDS THE 16TH AND NEED INR ADJUSTED SO SHE CAN HAVE THE SURGERY. IT NEEDS TO BE AT 1.    [phone] PT'S NUMBER     THANK  YOU

## 2019-10-11 DIAGNOSIS — E03.9 ACQUIRED HYPOTHYROIDISM: ICD-10-CM

## 2019-10-11 DIAGNOSIS — Z11.59 NEED FOR HEPATITIS C SCREENING TEST: Primary | ICD-10-CM

## 2019-10-11 DIAGNOSIS — R73.9 BLOOD GLUCOSE ELEVATED: ICD-10-CM

## 2019-10-11 DIAGNOSIS — E78.5 HYPERLIPIDEMIA, UNSPECIFIED HYPERLIPIDEMIA TYPE: ICD-10-CM

## 2019-10-11 DIAGNOSIS — M1A.9XX1 GOUT WITH TOPHUS: ICD-10-CM

## 2019-10-14 RX ORDER — TOPIRAMATE 25 MG/1
TABLET ORAL
Qty: 180 TABLET | Refills: 1 | Status: SHIPPED | OUTPATIENT
Start: 2019-10-14 | End: 2020-03-13

## 2019-10-15 LAB
ALBUMIN SERPL-MCNC: 4.3 G/DL (ref 3.5–5.2)
ALBUMIN/GLOB SERPL: 1.6 G/DL
ALP SERPL-CCNC: 61 U/L (ref 39–117)
ALT SERPL-CCNC: 12 U/L (ref 1–33)
AST SERPL-CCNC: 16 U/L (ref 1–32)
BASOPHILS # BLD AUTO: 0.1 10*3/MM3 (ref 0–0.2)
BASOPHILS NFR BLD AUTO: 1.2 % (ref 0–1.5)
BILIRUB SERPL-MCNC: 0.3 MG/DL (ref 0.2–1.2)
BUN SERPL-MCNC: 20 MG/DL (ref 8–23)
BUN/CREAT SERPL: 13.7 (ref 7–25)
CALCIUM SERPL-MCNC: 9.7 MG/DL (ref 8.6–10.5)
CHLORIDE SERPL-SCNC: 100 MMOL/L (ref 98–107)
CHOLEST SERPL-MCNC: 240 MG/DL (ref 0–200)
CO2 SERPL-SCNC: 25.9 MMOL/L (ref 22–29)
CREAT SERPL-MCNC: 1.46 MG/DL (ref 0.57–1)
EOSINOPHIL # BLD AUTO: 0.26 10*3/MM3 (ref 0–0.4)
EOSINOPHIL NFR BLD AUTO: 3 % (ref 0.3–6.2)
ERYTHROCYTE [DISTWIDTH] IN BLOOD BY AUTOMATED COUNT: 14.2 % (ref 12.3–15.4)
GLOBULIN SER CALC-MCNC: 2.7 GM/DL
GLUCOSE SERPL-MCNC: 99 MG/DL (ref 65–99)
HBA1C MFR BLD: 5.1 % (ref 4.8–5.6)
HCT VFR BLD AUTO: 35.4 % (ref 34–46.6)
HCV AB S/CO SERPL IA: 0.1 S/CO RATIO (ref 0–0.9)
HCV AB SERPL QL IA: NORMAL
HDLC SERPL-MCNC: 62 MG/DL (ref 40–60)
HGB BLD-MCNC: 11.6 G/DL (ref 12–15.9)
IMM GRANULOCYTES # BLD AUTO: 0.06 10*3/MM3 (ref 0–0.05)
IMM GRANULOCYTES NFR BLD AUTO: 0.7 % (ref 0–0.5)
LDLC SERPL CALC-MCNC: 100 MG/DL (ref 0–100)
LDLC/HDLC SERPL: 1.62 {RATIO}
LYMPHOCYTES # BLD AUTO: 1.94 10*3/MM3 (ref 0.7–3.1)
LYMPHOCYTES NFR BLD AUTO: 22.6 % (ref 19.6–45.3)
MCH RBC QN AUTO: 28.8 PG (ref 26.6–33)
MCHC RBC AUTO-ENTMCNC: 32.8 G/DL (ref 31.5–35.7)
MCV RBC AUTO: 87.8 FL (ref 79–97)
MONOCYTES # BLD AUTO: 0.51 10*3/MM3 (ref 0.1–0.9)
MONOCYTES NFR BLD AUTO: 5.9 % (ref 5–12)
NEUTROPHILS # BLD AUTO: 5.73 10*3/MM3 (ref 1.7–7)
NEUTROPHILS NFR BLD AUTO: 66.6 % (ref 42.7–76)
NRBC BLD AUTO-RTO: 0 /100 WBC (ref 0–0.2)
PLATELET # BLD AUTO: 156 10*3/MM3 (ref 140–450)
POTASSIUM SERPL-SCNC: 4.8 MMOL/L (ref 3.5–5.2)
PROT SERPL-MCNC: 7 G/DL (ref 6–8.5)
RBC # BLD AUTO: 4.03 10*6/MM3 (ref 3.77–5.28)
SODIUM SERPL-SCNC: 139 MMOL/L (ref 136–145)
TRIGL SERPL-MCNC: 388 MG/DL (ref 0–150)
TSH SERPL DL<=0.005 MIU/L-ACNC: 2.4 UIU/ML (ref 0.27–4.2)
URATE SERPL-MCNC: 4.1 MG/DL (ref 2.4–5.7)
VLDLC SERPL CALC-MCNC: 77.6 MG/DL
WBC # BLD AUTO: 8.6 10*3/MM3 (ref 3.4–10.8)

## 2019-10-17 ENCOUNTER — TELEPHONE (OUTPATIENT)
Dept: FAMILY MEDICINE CLINIC | Facility: CLINIC | Age: 69
End: 2019-10-17

## 2019-10-17 NOTE — TELEPHONE ENCOUNTER
CALLED AND S/W PT AND ADVISED TO RESTART COUMADIN. SHE STATED SHE HAD DENTAL PROCEDURE YESTERDAY AND RESTARTED HER COUMADIN TODAY.     ----- Message from Nathan Shipman MD sent at 10/16/2019  1:10 PM EDT -----  Restart her Coumadin      ----- Message -----  From: Jill Hanley MA  Sent: 10/16/2019  12:48 PM  To: MD DR. TARUN Adair,    PLEASE SEE BELOW. PT WAS SUPPOSED TO HAVE THAT DENTAL PROCEDURE YESTERDAY AND WAS SUPPOSED TO RESUME COUMADIN TODAY.       ----- Message -----  From: Morena Ramirez  Sent: 10/16/2019  10:24 AM  To: MIAN Louise [phone]    OCT 15 INR RESULTS OUT OF RANGE    INR AT 1.1    THANK YOU

## 2019-10-24 ENCOUNTER — OFFICE VISIT (OUTPATIENT)
Dept: FAMILY MEDICINE CLINIC | Facility: CLINIC | Age: 69
End: 2019-10-24

## 2019-10-24 VITALS
OXYGEN SATURATION: 93 % | RESPIRATION RATE: 16 BRPM | BODY MASS INDEX: 42.33 KG/M2 | HEIGHT: 62 IN | SYSTOLIC BLOOD PRESSURE: 108 MMHG | DIASTOLIC BLOOD PRESSURE: 72 MMHG | WEIGHT: 230 LBS | TEMPERATURE: 98 F | HEART RATE: 81 BPM

## 2019-10-24 DIAGNOSIS — E03.9 ACQUIRED HYPOTHYROIDISM: ICD-10-CM

## 2019-10-24 DIAGNOSIS — M1A.9XX1 GOUT WITH TOPHUS: ICD-10-CM

## 2019-10-24 DIAGNOSIS — M79.7 FIBROMYALGIA: ICD-10-CM

## 2019-10-24 DIAGNOSIS — I10 BENIGN ESSENTIAL HYPERTENSION: Primary | ICD-10-CM

## 2019-10-24 DIAGNOSIS — I82.220 INFERIOR VENA CAVA OCCLUSION (HCC): ICD-10-CM

## 2019-10-24 DIAGNOSIS — I48.0 PAF (PAROXYSMAL ATRIAL FIBRILLATION) (HCC): ICD-10-CM

## 2019-10-24 DIAGNOSIS — K21.9 GASTROESOPHAGEAL REFLUX DISEASE, ESOPHAGITIS PRESENCE NOT SPECIFIED: ICD-10-CM

## 2019-10-24 DIAGNOSIS — N18.30 CHRONIC RENAL IMPAIRMENT, STAGE 3 (MODERATE) (HCC): ICD-10-CM

## 2019-10-24 DIAGNOSIS — D63.8 ANEMIA OF CHRONIC DISORDER: ICD-10-CM

## 2019-10-24 DIAGNOSIS — J45.40 MODERATE PERSISTENT ASTHMA WITHOUT COMPLICATION: ICD-10-CM

## 2019-10-24 DIAGNOSIS — G62.89 OTHER POLYNEUROPATHY: ICD-10-CM

## 2019-10-24 LAB — INR PPP: 1.4 (ref 0.9–1.1)

## 2019-10-24 PROCEDURE — 36416 COLLJ CAPILLARY BLOOD SPEC: CPT | Performed by: INTERNAL MEDICINE

## 2019-10-24 PROCEDURE — 99214 OFFICE O/P EST MOD 30 MIN: CPT | Performed by: INTERNAL MEDICINE

## 2019-10-24 PROCEDURE — 85610 PROTHROMBIN TIME: CPT | Performed by: INTERNAL MEDICINE

## 2019-10-24 RX ORDER — FUROSEMIDE 20 MG/1
20 TABLET ORAL DAILY
Qty: 90 TABLET | Refills: 3 | Status: SHIPPED | OUTPATIENT
Start: 2019-10-24 | End: 2020-11-03

## 2019-10-24 RX ORDER — CEPHALEXIN 250 MG/1
CAPSULE ORAL
Refills: 0 | COMMUNITY
Start: 2019-10-16 | End: 2020-07-02

## 2019-10-24 NOTE — PROGRESS NOTES
Subjective   Alison Colvin is a 69 y.o. female. Patient is here today for follow-up on her history of paroxysmal atrial fibrillation, inferior vena cava occlusion, hypertension, asthma and COPD, GERD, hypothyroidism, peripheral neuropathy and fibromyalgia.  She also has chronic kidney disease and gout.  She had recent oral surgery and had to be off her Coumadin for a while and is just restarted 2 days ago.  She is recovering well from that and otherwise has been stable.  Her breathing is been fine according to the patient  Chief Complaint   Patient presents with   • Hypertension     AFIB- PT HERE FOR FOLLOW UP           Vitals:    10/24/19 1543   BP: 108/72   Pulse: 81   Resp: 16   Temp: 98 °F (36.7 °C)   SpO2: 93%     The following portions of the patient's history were reviewed and updated as appropriate: allergies, current medications, past family history, past medical history, past social history, past surgical history and problem list.    Past Medical History:   Diagnosis Date   • Abnormal nuclear stress test    • Acute sinusitis    • Allergy    • Benign essential hypertension    • Cellulitis    • Chest pain    • Colon cancer (CMS/HCC)    • COPD (chronic obstructive pulmonary disease) (CMS/HCC)    • Disease of thyroid gland    • Edema of leg    • Epilepsy (CMS/HCC)    • Esophageal reflux    • Foot pain    • Myalgia and myositis    • Onychomycosis of toenail    • Transient cerebral ischemia    • URI (upper respiratory infection)       Allergies   Allergen Reactions   • Pneumococcal Vaccines Delirium     Fever, chills   • Asa [Aspirin]    • Levofloxacin    • Meperidine Other (See Comments)   • Naproxen    • Pregabalin Other (See Comments)   • Propoxyphene    • Sulfa Antibiotics Other (See Comments)      Social History     Socioeconomic History   • Marital status:      Spouse name: Not on file   • Number of children: Not on file   • Years of education: Not on file   • Highest education level: Not on file    Tobacco Use   • Smoking status: Never Smoker   • Smokeless tobacco: Never Used   Substance and Sexual Activity   • Alcohol use: No   • Drug use: No        Current Outpatient Medications:   •  allopurinol (ZYLOPRIM) 300 MG tablet, TAKE 1 TABLET BY MOUTH  DAILY, Disp: 90 tablet, Rfl: 3  •  baclofen (LIORESAL) 10 MG tablet, TAKE 1 TABLET BY MOUTH TWO  TIMES DAILY, Disp: 180 tablet, Rfl: 1  •  BROVANA 15 MCG/2ML nebulizer solution, USE 1 VIAL PER NEBULIZER BID, Disp: , Rfl: 5  •  budesonide (PULMICORT) 1 MG/2ML nebulizer solution, Pulmicort SUSP; Patient Sig: Pulmicort SUSP USE 1 UNIT DOSE VIA NEBULIZER TWICE DAILY; 0; 25-Mar-2013; Active, Disp: , Rfl:   •  calcium carbonate (OS-SEBASTIÁN) 600 MG tablet, Take 600 mg by mouth 2 (two) times a day with meals., Disp: , Rfl:   •  cephalexin (KEFLEX) 250 MG capsule, TK ONE C PO  Q 6 HOURS TAT, Disp: , Rfl: 0  •  DULoxetine (CYMBALTA) 60 MG capsule, TAKE 1 CAPSULE BY MOUTH  DAILY, Disp: 90 capsule, Rfl: 1  •  furosemide (LASIX) 20 MG tablet, Take 1 tablet by mouth Daily., Disp: 90 tablet, Rfl: 3  •  HYDROcodone-acetaminophen (NORCO)  MG per tablet, TK 1 T PO Q 8 H PRF PAIN, Disp: , Rfl: 0  •  ipratropium-albuterol (DUO-NEB) 0.5-2.5 mg/mL nebulizer, USE 1 VIAL PER NEBULIZER QID, Disp: , Rfl: 5  •  levothyroxine (SYNTHROID, LEVOTHROID) 50 MCG tablet, TAKE 1 TABLET BY MOUTH  DAILY, Disp: 90 tablet, Rfl: 1  •  metoclopramide (REGLAN) 10 MG tablet, Take 1 tablet by mouth 4 (Four) Times a Day., Disp: 360 tablet, Rfl: 3  •  Multiple Vitamins-Minerals (WOMENS 50+ MULTI VITAMIN/MIN PO), Take 1 tablet by mouth daily., Disp: , Rfl:   •  nystatin (MYCOSTATIN) 418506 UNIT/GM powder, Apply  topically to the appropriate area as directed Every 12 (Twelve) Hours., Disp: 15 g, Rfl: 0  •  OXYGEN-HELIUM IN, Inhale., Disp: , Rfl:   •  pantoprazole (PROTONIX) 40 MG EC tablet, Take 1 tablet by mouth 2 (Two) Times a Day., Disp: 180 tablet, Rfl: 3  •  potassium chloride (K-DUR,KLOR-CON) 20 MEQ CR  tablet, TAKE 1 TABLET BY MOUTH TWO  TIMES DAILY, Disp: 180 tablet, Rfl: 1  •  predniSONE (DELTASONE) 5 MG tablet, TAKE 1 TABLET BY MOUTH  DAILY, Disp: 90 tablet, Rfl: 1  •  tiZANidine (ZANAFLEX) 4 MG tablet, TAKE 1 TABLET BY MOUTH TWO  TIMES DAILY, Disp: 180 tablet, Rfl: 1  •  topiramate (TOPAMAX) 25 MG tablet, TAKE 1 TABLET BY MOUTH TWO  TIMES DAILY, Disp: 180 tablet, Rfl: 1  •  warfarin (COUMADIN) 4 MG tablet, Take 1 tablet by mouth As Needed (TAKE ON MONDAY AND FRIDAY). TAKE 1 TABLET BY MOUTH ON Monday AND Friday, Disp: 30 tablet, Rfl: 3  •  warfarin (COUMADIN) 5 MG tablet, Alternate 5 mg with 2.5 mg every other day, Disp: 90 tablet, Rfl: 3     Objective     History of Present Illness     Review of Systems   Constitutional: Negative.    HENT: Negative.    Respiratory: Negative.    Cardiovascular: Negative.    Gastrointestinal: Negative.    Genitourinary: Negative.    Musculoskeletal: Negative.    Skin: Negative.    Neurological: Negative.    Psychiatric/Behavioral: Negative.        Physical Exam   Constitutional: She is oriented to person, place, and time. She appears well-developed and well-nourished.   Pleasant, cooperative no acute distress, sitting in a wheelchair, blood pressure 120/70   HENT:   Head: Normocephalic and atraumatic.   Eyes: Conjunctivae are normal. Pupils are equal, round, and reactive to light. No scleral icterus.   Neck: Normal range of motion. Neck supple.   Cardiovascular: Normal rate, regular rhythm and normal heart sounds.   Pulmonary/Chest: Effort normal and breath sounds normal. No respiratory distress. She has no wheezes. She has no rales.   Musculoskeletal: Normal range of motion.   Neurological: She is alert and oriented to person, place, and time.   Skin: Skin is warm and dry.   Psychiatric: She has a normal mood and affect. Her behavior is normal.   Nursing note and vitals reviewed.      ASSESSMENT CBC was essentially normal.  Hepatitis C screen was negative.  Uric acid level is  well controlled at 4.1.  TSH was normal.  CMP had a normal sugar of 99, creatinine elevated but stable at 1.46 and was otherwise essentially normal.  Hemoglobin A1c was normal at 5.1.  INR is low at 1.4.  #1-hypertension, normal pressure today  #2-paroxysmal atrial fibrillation, regular cardiac rhythm today  #3-history of asthma and COPD, breathing well today  #4-GERD, controlled on medication  #5-hypothyroidism, euthyroid on replacement  #6-gout, asymptomatic and well controlled  #7-Coumadin therapy, just restarted on Coumadin 2 days ago     Problem List Items Addressed This Visit        Cardiovascular and Mediastinum    Benign essential hypertension - Primary    Relevant Medications    furosemide (LASIX) 20 MG tablet    PAF (paroxysmal atrial fibrillation) (CMS/HCC)    Inferior vena cava occlusion (CMS/HCC)       Respiratory    Asthma       Digestive    Gastroesophageal reflux disease       Endocrine    Hypothyroidism       Genitourinary    Chronic renal impairment, stage 3 (moderate) (CMS/HCC)    Relevant Medications    furosemide (LASIX) 20 MG tablet       Hematopoietic and Hemostatic    Anemia of chronic disorder       Other    Gout with tophus          PLAN the patient will continue current medicines as now.  She will continue on the warfarin 4 mg 2 days a week and 2 mg the other days and she checks her pro time at home on a weekly basis.  I plan on rechecking her in 6 months with a CBC, CMP, lipid panel, TSH and free T4 and uric acid level    There are no Patient Instructions on file for this visit.  Return in about 6 months (around 4/24/2020) for with labs.

## 2019-11-06 ENCOUNTER — TELEPHONE (OUTPATIENT)
Dept: FAMILY MEDICINE CLINIC | Facility: CLINIC | Age: 69
End: 2019-11-06

## 2019-11-06 RX ORDER — DOXYCYCLINE HYCLATE 100 MG
100 TABLET ORAL 2 TIMES DAILY
Qty: 14 TABLET | Refills: 0 | Status: SHIPPED | OUTPATIENT
Start: 2019-11-06 | End: 2020-12-15

## 2019-11-06 NOTE — TELEPHONE ENCOUNTER
CALLED AND S/W PT AND ADVISED WHAT DR. DAILEY SAID. PT VOICED UNDERSTANDING.     ----- Message from Nathan Dailey MD sent at 11/6/2019  9:45 AM EST -----  Contact: CHRYSTAL FROM MD [phone]  Continue the same dose and recheck in 1 week      ----- Message -----  From: iJll Hanley MA  Sent: 11/6/2019   9:28 AM  To: MD DR. TARUN Adair,  PLEASE SEE BELOW AND ADVISE. PATIENT IS CURRENTLY ON warfarin 4 mg 2 days a week and 2 mg the other days. THANK YOU.      ----- Message -----  From: Gela Ramírez RegSched Rep  Sent: 11/6/2019   9:11 AM  To: MIAN Louise FROM MD INR CALLED TO GIVE PT'S INR RESULTS    INR 1.8    CHRYSTAL PHELPS -[phone]

## 2019-11-06 NOTE — TELEPHONE ENCOUNTER
RX HAS BEEN SENT TO THE PHARMACY FOR PATIENT. PATIENT IS AWARE.     ----- Message from Nathan Shipman MD sent at 11/6/2019 10:10 AM EST -----  Contact: PATIENT  Limits send her some doxycycline 100 mg twice a day, 14 tablets      ----- Message -----  From: Jill Hanley MA  Sent: 11/6/2019   9:59 AM  To: MD DR. TARUN Adair,    PATIENT IS HAVING A BAD COUGH, HEADACHES, SORE THROAT, AND EAR ACHE, AND GENERAL BODY ACHES AND WANTED TO KNOW IF YOU WOULD CALL HER IN AN ANTIBIOTIC? PLEASE ADVISE. THANK YOU.    [place]

## 2019-11-11 RX ORDER — WARFARIN SODIUM 4 MG/1
TABLET ORAL
Qty: 93 TABLET | Refills: 3 | Status: SHIPPED | OUTPATIENT
Start: 2019-11-11 | End: 2021-01-22

## 2019-11-14 ENCOUNTER — TELEPHONE (OUTPATIENT)
Dept: FAMILY MEDICINE CLINIC | Facility: CLINIC | Age: 69
End: 2019-11-14

## 2019-11-14 NOTE — TELEPHONE ENCOUNTER
CRISTEL WITH [facility] CALLED IN WITH AN OUT OF RANGE INR OF 1.8 YESTERDAY AFTERNOON.  PLEASE REVIEW WITH  AND SEE IF HE WOULD LIKE TO CHANGE PT'S MEDICATION DOSAGE.    PLEASE CONTACT PT [phone]

## 2019-11-15 ENCOUNTER — TELEPHONE (OUTPATIENT)
Dept: FAMILY MEDICINE CLINIC | Facility: CLINIC | Age: 69
End: 2019-11-15

## 2019-11-15 NOTE — TELEPHONE ENCOUNTER
DR. MCNEIL,  PLEASE SEE BELOW AND ADVISE. PATIENT IS CURRENTLY ON warfarin 4 mg 2 days a week and 2 mg the other days. THANK YOU.

## 2019-11-15 NOTE — TELEPHONE ENCOUNTER
CALLED AND S/W PT AND ADVISED WHAT DR. DAILEY SAID. PT VOICED UNDERSTANDING.     ----- Message from Nathan Dailey MD sent at 11/15/2019  2:09 PM EST -----  Contact: PT  She needs to stop it now.  She should be able to restart it the day after the injection      ----- Message -----  From: Jill Hanley MA  Sent: 11/15/2019  12:41 PM  To: MD DR. TARUN Adair,    PLEASE SEE BELOW AND ADVISE. THANK YOU.      ----- Message -----  From: Gela Ramírez RegSched Rep  Sent: 11/14/2019   4:01 PM  To: Jill Hanley MA    PT SAID SHE HAS A EPIDURAL INJECTION ON 11-20-19 OVER AT [facility] [place] AND IS ASKING WHEN SHE NEEDS TO STOP HER WARFARIN.    PT'S # [phone]

## 2019-12-04 ENCOUNTER — TELEPHONE (OUTPATIENT)
Dept: FAMILY MEDICINE CLINIC | Facility: CLINIC | Age: 69
End: 2019-12-04

## 2019-12-04 NOTE — TELEPHONE ENCOUNTER
TIRED TO CALL WILLIE BACK BUT THE PHONE JUST RODAS RINGING.  DR DAILEY WOULD LIKE TO KNOW SHE IS TAKING HER WARFARIN.  I WILL TRY TO CALL HER BACK. [facility]

## 2019-12-04 NOTE — TELEPHONE ENCOUNTER
WILLIE WITH [facility] CALLED IN TO REPORT AN OUT OF RANGE INR FOR PT.    PT HAD INR ON 12/3/19 FOR 0.9.  PLEASE CHECK WITH THE DR AND SEE IF HE WOULD LIKE TO CHANGE ANYTHING WITH PT'S MEDICINE ETC.    PLEASE CONTACT PT TO ADVISE [phone]

## 2019-12-12 ENCOUNTER — TELEPHONE (OUTPATIENT)
Dept: FAMILY MEDICINE CLINIC | Facility: CLINIC | Age: 69
End: 2019-12-12

## 2019-12-12 NOTE — TELEPHONE ENCOUNTER
CALLED AND S/W PT AND ADVISED WHAT DR. DAILEY SAID. PT VOICED UNDERSTANDING.    ----- Message from Nathan Dailey MD sent at 12/11/2019  4:20 PM EST -----  Contact: PATIENT   Have her continue the 4 mg on Monday and Friday and 2 mg the other days and recheck her pro time and INR on Monday       ----- Message -----  From: Jill Hanley MA  Sent: 12/11/2019   3:26 PM EST  To: MD DR. TARUN Adair,    PATIENT HAD INR DONE YESTERDAY AND IT WAS 1.2. I SPOKE WITH HER TODAY AND SHE IS DONE WITH HER SHOTS AND IS TAKING 4MG ON Monday AND Friday AND 2MG THE OTHER DAYS OF THE WEEK. PLEASE ADVISE WHAT SHE SHOULD BE TAKING NOW AND WHEN TO RECHECK? THANK YOU.

## 2019-12-17 ENCOUNTER — TELEPHONE (OUTPATIENT)
Dept: FAMILY MEDICINE CLINIC | Facility: CLINIC | Age: 69
End: 2019-12-17

## 2019-12-17 NOTE — TELEPHONE ENCOUNTER
CALLED AND S/W PT AND ADVISED WHAT DR. DAILEY SAID. PT VOICED UNDERSTANDING.     ----- Message from Nathan Dailey MD sent at 12/17/2019  3:12 PM EST -----   Her INR was okay at 2.9.  Continue the same dose and of course recheck in a week      ----- Message -----  From: Interface, Scans Incoming  Sent: 12/17/2019   1:20 PM EST  To: Nathan Dailey MD

## 2020-01-13 RX ORDER — METOCLOPRAMIDE 10 MG/1
TABLET ORAL
Qty: 360 TABLET | Refills: 1 | Status: SHIPPED | OUTPATIENT
Start: 2020-01-13 | End: 2021-01-06

## 2020-01-13 RX ORDER — TIZANIDINE 4 MG/1
TABLET ORAL
Qty: 180 TABLET | Refills: 1 | Status: SHIPPED | OUTPATIENT
Start: 2020-01-13 | End: 2020-07-22

## 2020-01-13 RX ORDER — BACLOFEN 10 MG/1
TABLET ORAL
Qty: 180 TABLET | Refills: 1 | Status: SHIPPED | OUTPATIENT
Start: 2020-01-13 | End: 2021-01-06

## 2020-01-13 RX ORDER — PREDNISONE 1 MG/1
5 TABLET ORAL DAILY
Qty: 90 TABLET | Refills: 1 | Status: SHIPPED | OUTPATIENT
Start: 2020-01-13 | End: 2020-07-22

## 2020-01-13 RX ORDER — PANTOPRAZOLE SODIUM 40 MG/1
TABLET, DELAYED RELEASE ORAL
Qty: 180 TABLET | Refills: 1 | Status: SHIPPED | OUTPATIENT
Start: 2020-01-13 | End: 2020-11-03

## 2020-02-17 NOTE — TELEPHONE ENCOUNTER
----- Message from Kourtney Lovelace sent at 4/24/2018 11:29 AM EDT -----  Contact: Harshad with INR  Report for out of range test    Date: 4/24  INR- 3.7    Caller is unaware of pt's medication.    Caller # [phone]   Discharge instructions reviewed with pt. She verbalized understanding. Pt ambulated out of ED in stable condition.       Collins Marcano RN  02/17/20 0565

## 2020-02-25 ENCOUNTER — TELEPHONE (OUTPATIENT)
Dept: FAMILY MEDICINE CLINIC | Facility: CLINIC | Age: 70
End: 2020-02-25

## 2020-02-26 ENCOUNTER — TELEPHONE (OUTPATIENT)
Dept: FAMILY MEDICINE CLINIC | Facility: CLINIC | Age: 70
End: 2020-02-26

## 2020-02-26 NOTE — TELEPHONE ENCOUNTER
Had a contact call about a out of range INR from the 25th of feb.     Call back contact is [phone]    The dosage is 1.3 it was advised to me that they just need us to call the pt and let them know this     [phone] is the patient contact info

## 2020-02-28 NOTE — TELEPHONE ENCOUNTER
Per Dr. Shipman he would like the patient to take 4MG daily for one week then recheck INR and let us know the results.     Patient  has been informed and voiced understanding.

## 2020-02-28 NOTE — TELEPHONE ENCOUNTER
Patient is currently taking 4MG Warfarin on Monday & Friday and 2MG the rest of the week.. INR that was reported two days ago is 1.3    Please advise.

## 2020-03-03 ENCOUNTER — TELEPHONE (OUTPATIENT)
Dept: FAMILY MEDICINE CLINIC | Facility: CLINIC | Age: 70
End: 2020-03-03

## 2020-03-03 NOTE — TELEPHONE ENCOUNTER
WILLIE WITH [facility] CALLED IN TO REPORT AND OUT OF RANGE INR.    SHE ADVISED THAT TODAY PT'S INR IS 2.0.    PLEASE CONTACT PT TO ADVISE ON ANY MEDICATION CHANGES OR WHAT DR WOULD LIKE HER TO DO -[phone]

## 2020-03-13 RX ORDER — LEVOTHYROXINE SODIUM 0.05 MG/1
50 TABLET ORAL DAILY
Qty: 90 TABLET | Refills: 1 | Status: SHIPPED | OUTPATIENT
Start: 2020-03-13 | End: 2020-11-03

## 2020-03-13 RX ORDER — DULOXETIN HYDROCHLORIDE 60 MG/1
60 CAPSULE, DELAYED RELEASE ORAL DAILY
Qty: 90 CAPSULE | Refills: 1 | Status: SHIPPED | OUTPATIENT
Start: 2020-03-13 | End: 2020-11-03

## 2020-03-13 RX ORDER — POTASSIUM CHLORIDE 20 MEQ/1
TABLET, EXTENDED RELEASE ORAL
Qty: 180 TABLET | Refills: 1 | Status: SHIPPED | OUTPATIENT
Start: 2020-03-13 | End: 2020-11-03

## 2020-03-13 RX ORDER — TOPIRAMATE 25 MG/1
TABLET ORAL
Qty: 180 TABLET | Refills: 1 | Status: SHIPPED | OUTPATIENT
Start: 2020-03-13 | End: 2020-11-03

## 2020-03-20 ENCOUNTER — TELEPHONE (OUTPATIENT)
Dept: FAMILY MEDICINE CLINIC | Facility: CLINIC | Age: 70
End: 2020-03-20

## 2020-04-14 ENCOUNTER — TELEPHONE (OUTPATIENT)
Dept: FAMILY MEDICINE CLINIC | Facility: CLINIC | Age: 70
End: 2020-04-14

## 2020-04-14 NOTE — TELEPHONE ENCOUNTER
Spoke to patient she is to take 4mg on t,t,s,s all other days take 3mg and recheck in 1 week per dr lira

## 2020-04-14 NOTE — TELEPHONE ENCOUNTER
JERMAINE AT [facility] CALLED WITH A CRITICAL INR    INR 1.8    PT'S # [phone] JERMAINE SAID WE CALL THE PT    JERMAINE PHELPS [facility]  [phone]

## 2020-04-20 ENCOUNTER — TELEPHONE (OUTPATIENT)
Dept: FAMILY MEDICINE CLINIC | Facility: CLINIC | Age: 70
End: 2020-04-20

## 2020-04-20 NOTE — TELEPHONE ENCOUNTER
PATIENT CALLING TO [facility] LABS ON 4-. HUB ATTEMPTED WARM TRANSFER, UNSUCCESSFUL. SHE WILL CALL AT A LATER DATE TO RESCHEDULE LABS AND F/U FOR LAB APPTS. SHE DECLINED VIDEO VISIT AND TELEPHONE CALL.

## 2020-04-22 ENCOUNTER — TELEPHONE (OUTPATIENT)
Dept: FAMILY MEDICINE CLINIC | Facility: CLINIC | Age: 70
End: 2020-04-22

## 2020-05-13 ENCOUNTER — TELEPHONE (OUTPATIENT)
Dept: FAMILY MEDICINE CLINIC | Facility: CLINIC | Age: 70
End: 2020-05-13

## 2020-05-13 NOTE — TELEPHONE ENCOUNTER
[facility] called to report out of range INR. Attempted to warm transfer       INR was 4.2    Yesterday, May 12    [phone]

## 2020-05-20 ENCOUNTER — TELEPHONE (OUTPATIENT)
Dept: FAMILY MEDICINE CLINIC | Facility: CLINIC | Age: 70
End: 2020-05-20

## 2020-05-20 NOTE — TELEPHONE ENCOUNTER
Pam whiting [facility] is calling to report an out of range    INR  4.0 , test date 05/19/20    Please advise    Lab call back [phone]

## 2020-05-21 NOTE — TELEPHONE ENCOUNTER
I want her to hold her Coumadin for 2 days.  Check on what dose she has been taking as it looks like we will have to decrease it.

## 2020-05-22 NOTE — TELEPHONE ENCOUNTER
I would like her on the 2 mg 4 days a week and the 4 mg only on the other 3 days.  On her current schedule she could just switch the 2 mg for the 4 mg and vice versa

## 2020-05-22 NOTE — TELEPHONE ENCOUNTER
DR. MCNEIL,    PATIENT SAID SHE WAS TAKING 4MG ON Tuesday, Thursday, Saturday, AND Sunday AND 2MG Monday, Wednesday, AND Friday- SHE WILL HOLD FOR 2 DAYS AND WHAT DOSE DO YOU WANT HER TO BE ON?

## 2020-05-23 NOTE — TELEPHONE ENCOUNTER
----- Message from Kourtney Lovelaec sent at 9/17/2018  2:59 PM EDT -----  Contact: Pt - Germaine   Pt is calling for a refill     FOR  tiZANidine (ZANAFLEX) 4 MG tablet      Patient requests RX SENT TO   [facility] [address] [facility] [phone] [facility] [phone] FX      Caller# [phone]    Please and thank you.     PT ACUTE  Treatment Session          Pt seen on 4EF nursing unit.                                                Frequency Comments: SUN M-F (IRP denied, home M possibly, bring cane to trial)    RECOMMENDATIONS FOR DISCHARGE:  Recommendation for Discharge: PT WI: Home;Home therapy (05/23/20 1530)                                                                                                                 Admitting complaint: Shortness of breath [R06.02]  Hypoxia [R09.02]  Pleural effusion on right [J90]  Supratherapeutic INR [R79.1]  Pneumonia of right lower lobe due to infectious organism (CMS/HCC) [J18.1]  Suspected COVID-19 virus infection [Z20.828]                                     Precautions  Other Precautions: no longer in isolation (05/18/20 0924)    SUBJECTIVE:    Subjective: Pt agreeable to session. \"Sounds good!\" (05/23/20 1530)  Subjective/Objective Comments: RNNeto aware of session.  Pt seated in recliner at completion of session with needs met.  (05/23/20 1530)    OBJECTIVE:  Complex Lines: Chest tubes to gravity (05/23/20 1530)  Safety Measures: Alarms (05/23/20 1530)    RN reported Vega Fall Scale Score: 60    ASSESSMENT:   Continued focus on balance training this session.  Pt ambulates without AD x375', supervision for safety, very mild unsteadiness noted.  Discussed trial of cane next session.  Performed standing balance exercises including side stepping, heel walking, toe walking, hip flexion/hip abduction repetitions with light touch/steadying for balance corrections.  Will benefit from continued skilled PT to maximize independence and safety.    PT Identified Barriers to Discharge: medical (chest tube to suction), lives alone, mild deconditioning     PLAN:   Continue skilled PT, including the following Treatment/Interventions: Functional transfer training;Strengthening;Endurance training;Bed mobility;Gait training (05/21/20 1520)   Frequency Comments: SUN M-F (IRP denied, home M  possibly, bring cane to trial) (05/23/20 1530)    Treatment Plan for Next Session: trial dual task during gait, standing balance and LE ex's, gait with cane.  Additional Plan Considerations: IRP consult placed; daughter concerned about return home alone       RECOMMENDATIONS FOR DISCHARGE:  Recommendation for Discharge: PT WI: Home;Home therapy (05/23/20 1530)    PT/OT Mobility Equipment for Discharge: owns 4WW, no anticipated needs (05/22/20 1420)  PT/OT ADL Equipment for Discharge: continue to assess; owns shower chair, grab bars (05/23/20 0915)     Assistance needed when returning home:   Discussed with patient/caregiver who acknowledged understanding of current recommendations for safe home discharge plan. Based on current level of assistance, recommendations are: home with use of AD. Help to be provided by home therapy.      ICU Mobility Assesment (PERME):       Last 24 hours of Functional Data  Bed Mobility   Bed Mobility  Supine to Sit: Modified Independent (05/23/20 1530)  Sit to Supine: Modified Independent (05/23/20 1530)  Bed Mobility Comments: no concerns (05/23/20 1530)    Transfers  Transfers  Sit to Stand: Supervision (Supv) (05/23/20 1530)  Stand to Sit: Supervision (Supv) (05/23/20 1530)  Assistive Device/: Gait Belt;1 Person (05/23/20 1530)  Transfer Comments 1: Supv for safety without AD (05/23/20 1530)      Gait  Gait  Gait Assistance: Supervision (Supv) (05/23/20 1530)  Assistive Device/: Gait Belt (05/23/20 1530)  Ambulation Distance (Feet): 375 Feet (05/23/20 1530)  Pattern: Within functional limits (05/23/20 1530)  Ambulation Surface: Tile (05/23/20 1530)  Gait Comments 1: Supv for safety, very mild unsteadiness noted (05/23/20 1530),      Stairs  Stairs Mobility  Stair Management Assistance: Not Applicable - did not perform prior to hospitalization/illness (05/23/20 1530)       Neuromuscular Re-education  Neuromuscular Re-education  Neuromuscular Re-education 1:  Side stepping, side stepping with various arm motions, heel walking, toe walking, hip flexion repetitions, hip abduction repetitions (05/23/20 1530)    Balance  Balance  Sitting - Static: Independent (05/23/20 1530)  Sitting - Dynamic: Independent (05/23/20 1530)  Standing - Static: Supervision (Supv) (05/23/20 1530)  Standing - Dynamic (eyes open): Minimal Assist (Min) (05/23/20 1530)  Balance Comments #1: Min A during standing balance exercises (05/23/20 1530)    Wheelchair Mobility       Patient's Personal Goal: to get better (05/21/20 1520)    Therapy Goals:    Goals  Short Term Goals to Be Reviewed On: 05/23/20 (05/16/20 0750)  Short Term Goals = Discharge Goals: Yes (05/16/20 0750)  Goal Agreement: Patient agrees with goals and treatment plan (05/16/20 0750)  Bed Mobility Discharge Goal: with modified independence (05/16/20 0750)  Transfer Discharge Goal: with modified independence (05/16/20 0750)  Ambulation Discharge Goal: 150ft with LRAD and modified independence (05/16/20 0750)  Other Discharge Goal 1: improve Short [place] Balance Score by 8 points to minimize fall risk (05/20/20 1128)  Goals Comments: updated goals to include balance goal (05/20/20 1128)        PT Time Spent: 40 minutes (05/23/20 1530)    See PT flowsheet for full details regarding the PT therapy provided.

## 2020-05-27 NOTE — TELEPHONE ENCOUNTER
[facility]OF[facility] FOR THE PATIENT, JES ROJO. EULOGIO STATED THAT THE PATIENT'S INR ON 5/26/20 WAS 2.4.    IF THERE ARE ANY QUESTIONS OR CONCERNS PLEASE CALL EULOGIO FROM [facility] -[phone].

## 2020-06-03 DIAGNOSIS — I10 BENIGN ESSENTIAL HYPERTENSION: Primary | ICD-10-CM

## 2020-06-03 DIAGNOSIS — E03.9 ACQUIRED HYPOTHYROIDISM: ICD-10-CM

## 2020-06-03 DIAGNOSIS — M1A.9XX1 GOUT WITH TOPHUS: ICD-10-CM

## 2020-06-26 LAB
ALBUMIN SERPL-MCNC: 4.3 G/DL (ref 3.5–5.2)
ALBUMIN/GLOB SERPL: 1.5 G/DL
ALP SERPL-CCNC: 62 U/L (ref 39–117)
ALT SERPL-CCNC: 11 U/L (ref 1–33)
AST SERPL-CCNC: 13 U/L (ref 1–32)
BASOPHILS # BLD AUTO: ABNORMAL 10*3/UL
BASOPHILS # BLD MANUAL: 0.09 10*3/MM3 (ref 0–0.2)
BASOPHILS NFR BLD MANUAL: 1 % (ref 0–1.5)
BILIRUB SERPL-MCNC: 0.3 MG/DL (ref 0.2–1.2)
BUN SERPL-MCNC: 18 MG/DL (ref 8–23)
BUN/CREAT SERPL: 11.2 (ref 7–25)
CALCIUM SERPL-MCNC: 9.5 MG/DL (ref 8.6–10.5)
CHLORIDE SERPL-SCNC: 103 MMOL/L (ref 98–107)
CHOLEST SERPL-MCNC: 230 MG/DL (ref 0–200)
CO2 SERPL-SCNC: 25.1 MMOL/L (ref 22–29)
CREAT SERPL-MCNC: 1.61 MG/DL (ref 0.57–1)
DIFFERENTIAL COMMENT: ABNORMAL
EOSINOPHIL # BLD AUTO: ABNORMAL 10*3/UL
EOSINOPHIL # BLD MANUAL: 0.18 10*3/MM3 (ref 0–0.4)
EOSINOPHIL NFR BLD AUTO: ABNORMAL %
EOSINOPHIL NFR BLD MANUAL: 2 % (ref 0.3–6.2)
ERYTHROCYTE [DISTWIDTH] IN BLOOD BY AUTOMATED COUNT: 15.1 % (ref 12.3–15.4)
GLOBULIN SER CALC-MCNC: 2.8 GM/DL
GLUCOSE SERPL-MCNC: 107 MG/DL (ref 65–99)
HCT VFR BLD AUTO: 32.4 % (ref 34–46.6)
HDLC SERPL-MCNC: 61 MG/DL (ref 40–60)
HGB BLD-MCNC: 10.4 G/DL (ref 12–15.9)
LDLC SERPL CALC-MCNC: 106 MG/DL (ref 0–100)
LDLC/HDLC SERPL: 1.74 {RATIO}
LYMPHOCYTES # BLD AUTO: ABNORMAL 10*3/UL
LYMPHOCYTES # BLD MANUAL: 1.05 10*3/MM3 (ref 0.7–3.1)
LYMPHOCYTES NFR BLD AUTO: ABNORMAL %
LYMPHOCYTES NFR BLD MANUAL: 12 % (ref 19.6–45.3)
MCH RBC QN AUTO: 28.2 PG (ref 26.6–33)
MCHC RBC AUTO-ENTMCNC: 32.1 G/DL (ref 31.5–35.7)
MCV RBC AUTO: 87.8 FL (ref 79–97)
MONOCYTES # BLD MANUAL: 0.26 10*3/MM3 (ref 0.1–0.9)
MONOCYTES NFR BLD AUTO: ABNORMAL %
MONOCYTES NFR BLD MANUAL: 3 % (ref 5–12)
NEUTROPHILS # BLD MANUAL: 7.2 10*3/MM3 (ref 1.7–7)
NEUTROPHILS NFR BLD AUTO: ABNORMAL %
NEUTROPHILS NFR BLD MANUAL: 82 % (ref 42.7–76)
PLATELET # BLD AUTO: 261 10*3/MM3 (ref 140–450)
PLATELET BLD QL SMEAR: ABNORMAL
POTASSIUM SERPL-SCNC: 5 MMOL/L (ref 3.5–5.2)
PROT SERPL-MCNC: 7.1 G/DL (ref 6–8.5)
RBC # BLD AUTO: 3.69 10*6/MM3 (ref 3.77–5.28)
RBC MORPH BLD: ABNORMAL
SODIUM SERPL-SCNC: 138 MMOL/L (ref 136–145)
T4 FREE SERPL-MCNC: 1.05 NG/DL (ref 0.93–1.7)
TRIGL SERPL-MCNC: 315 MG/DL (ref 0–150)
TSH SERPL DL<=0.005 MIU/L-ACNC: 3.1 UIU/ML (ref 0.27–4.2)
URATE SERPL-MCNC: 4.4 MG/DL (ref 2.4–5.7)
VLDLC SERPL CALC-MCNC: 63 MG/DL
WBC # BLD AUTO: 8.78 10*3/MM3 (ref 3.4–10.8)

## 2020-07-02 ENCOUNTER — OFFICE VISIT (OUTPATIENT)
Dept: FAMILY MEDICINE CLINIC | Facility: CLINIC | Age: 70
End: 2020-07-02

## 2020-07-02 VITALS
TEMPERATURE: 97.7 F | SYSTOLIC BLOOD PRESSURE: 118 MMHG | DIASTOLIC BLOOD PRESSURE: 68 MMHG | OXYGEN SATURATION: 93 % | HEART RATE: 63 BPM

## 2020-07-02 DIAGNOSIS — E03.9 ACQUIRED HYPOTHYROIDISM: ICD-10-CM

## 2020-07-02 DIAGNOSIS — K21.9 GASTROESOPHAGEAL REFLUX DISEASE, ESOPHAGITIS PRESENCE NOT SPECIFIED: ICD-10-CM

## 2020-07-02 DIAGNOSIS — I82.220 INFERIOR VENA CAVA OCCLUSION (HCC): ICD-10-CM

## 2020-07-02 DIAGNOSIS — I10 BENIGN ESSENTIAL HYPERTENSION: Primary | ICD-10-CM

## 2020-07-02 DIAGNOSIS — M79.7 FIBROMYALGIA: ICD-10-CM

## 2020-07-02 DIAGNOSIS — I48.0 PAF (PAROXYSMAL ATRIAL FIBRILLATION) (HCC): ICD-10-CM

## 2020-07-02 DIAGNOSIS — G62.89 OTHER POLYNEUROPATHY: ICD-10-CM

## 2020-07-02 DIAGNOSIS — N18.30 CHRONIC RENAL IMPAIRMENT, STAGE 3 (MODERATE) (HCC): ICD-10-CM

## 2020-07-02 DIAGNOSIS — M1A.9XX1 GOUT WITH TOPHUS: ICD-10-CM

## 2020-07-02 DIAGNOSIS — D63.8 ANEMIA OF CHRONIC DISORDER: ICD-10-CM

## 2020-07-02 PROCEDURE — 99214 OFFICE O/P EST MOD 30 MIN: CPT | Performed by: INTERNAL MEDICINE

## 2020-07-02 NOTE — PROGRESS NOTES
Subjective   Alison Colvin is a 69 y.o. female. Patient is here today for follow-up on her hypertension, history of vena caval occlusion and paroxysmal atrial fibrillation, chronic venous insufficiency, GERD, hypothyroidism, peripheral neuropathy and fibromyalgia, chronic anemia and chronic renal insufficiency and gout.  Patient's in a wheelchair but is stable and is having no significant acute problems.  She has had no chest pain, shortness of breath, edema or myalgias  Chief Complaint   Patient presents with   • Hypertension     Follow Up Labs          Vitals:    07/02/20 1259   BP: 118/68   Pulse: 63   Temp: 97.7 °F (36.5 °C)   SpO2: 93%     There is no height or weight on file to calculate BMI.  The following portions of the patient's history were reviewed and updated as appropriate: allergies, current medications, past family history, past medical history, past social history, past surgical history and problem list.    Past Medical History:   Diagnosis Date   • Abnormal nuclear stress test    • Acute sinusitis    • Allergy    • Benign essential hypertension    • Cellulitis    • Chest pain    • Colon cancer (CMS/[facility])    • COPD (chronic obstructive pulmonary disease) (CMS/[facility])    • Disease of thyroid gland    • Edema of leg    • Epilepsy (CMS/[facility])    • Esophageal reflux    • Foot pain    • Myalgia and myositis    • Onychomycosis of toenail    • Transient cerebral ischemia    • URI (upper respiratory infection)       Allergies   Allergen Reactions   • Pneumococcal Vaccines Delirium     Fever, chills   • Asa [Aspirin]    • Levofloxacin    • Meperidine Other (See Comments)   • Naproxen    • Pregabalin Other (See Comments)   • Propoxyphene    • Sulfa Antibiotics Other (See Comments)      Social History     Socioeconomic History   • Marital status:      Spouse name: Not on file   • Number of children: Not on file   • Years of education: Not on file   • Highest education level: Not on file   Tobacco Use   •  Smoking status: Never Smoker   • Smokeless tobacco: Never Used   Substance and Sexual Activity   • Alcohol use: No   • Drug use: No        Current Outpatient Medications:   •  allopurinol (ZYLOPRIM) 300 MG tablet, TAKE 1 TABLET BY MOUTH  DAILY, Disp: 90 tablet, Rfl: 3  •  baclofen (LIORESAL) 10 MG tablet, TAKE 1 TABLET BY MOUTH TWO  TIMES DAILY, Disp: 180 tablet, Rfl: 1  •  calcium carbonate (OS-SEBASTIÁN) 600 MG tablet, Take 600 mg by mouth 2 (two) times a day with meals., Disp: , Rfl:   •  doxycycline (VIBRAMYICN) 100 MG tablet, Take 1 tablet by mouth 2 (Two) Times a Day., Disp: 14 tablet, Rfl: 0  •  DULoxetine (CYMBALTA) 60 MG capsule, TAKE 1 CAPSULE BY MOUTH  DAILY, Disp: 90 capsule, Rfl: 1  •  furosemide (LASIX) 20 MG tablet, Take 1 tablet by mouth Daily., Disp: 90 tablet, Rfl: 3  •  HYDROcodone-acetaminophen (NORCO)  MG per tablet, TK 1 T PO Q 8 H PRF PAIN, Disp: , Rfl: 0  •  ipratropium-albuterol (DUO-NEB) 0.5-2.5 mg/mL nebulizer, USE 1 VIAL PER NEBULIZER QID, Disp: , Rfl: 5  •  levothyroxine (SYNTHROID, LEVOTHROID) 50 MCG tablet, TAKE 1 TABLET BY MOUTH  DAILY, Disp: 90 tablet, Rfl: 1  •  metoclopramide (REGLAN) 10 MG tablet, TAKE 1 TABLET BY MOUTH 4  TIMES DAILY, Disp: 360 tablet, Rfl: 1  •  Multiple Vitamins-Minerals (WOMENS 50+ MULTI VITAMIN/MIN PO), Take 1 tablet by mouth daily., Disp: , Rfl:   •  nystatin (MYCOSTATIN) 749265 UNIT/GM powder, Apply  topically to the appropriate area as directed Every 12 (Twelve) Hours., Disp: 15 g, Rfl: 0  •  OXYGEN-HELIUM IN, Inhale., Disp: , Rfl:   •  pantoprazole (PROTONIX) 40 MG EC tablet, TAKE 1 TABLET BY MOUTH TWO  TIMES DAILY, Disp: 180 tablet, Rfl: 1  •  potassium chloride (K-DUR,KLOR-CON) 20 MEQ CR tablet, TAKE 1 TABLET BY MOUTH TWO  TIMES DAILY, Disp: 180 tablet, Rfl: 1  •  predniSONE (DELTASONE) 5 MG tablet, TAKE 1 TABLET BY MOUTH  DAILY, Disp: 90 tablet, Rfl: 1  •  tiZANidine (ZANAFLEX) 4 MG tablet, TAKE 1 TABLET BY MOUTH TWO  TIMES DAILY, Disp: 180 tablet,  Rfl: 1  •  topiramate (TOPAMAX) 25 MG tablet, TAKE 1 TABLET BY MOUTH TWO  TIMES DAILY, Disp: 180 tablet, Rfl: 1  •  warfarin (COUMADIN) 4 MG tablet, TAKE 1 TABLET BY MOUTH AS NEEDED ON MONDAY AND FRIDAYS, Disp: 93 tablet, Rfl: 3  •  warfarin (COUMADIN) 5 MG tablet, Alternate 5 mg with 2.5 mg every other day, Disp: 90 tablet, Rfl: 3  •  BROVANA 15 MCG/2ML nebulizer solution, USE 1 VIAL PER NEBULIZER BID, Disp: , Rfl: 5  •  budesonide (PULMICORT) 1 MG/2ML nebulizer solution, Pulmicort SUSP; Patient Sig: Pulmicort SUSP USE 1 UNIT DOSE VIA NEBULIZER TWICE DAILY; 0; 25-Mar-2013; Active, Disp: , Rfl:      Objective     History of Present Illness     Review of Systems   Constitutional: Negative.    HENT: Negative.    Respiratory: Negative.    Cardiovascular: Negative.    Gastrointestinal: Negative.    Genitourinary: Negative.    Musculoskeletal: Negative.    Skin: Negative.    Neurological: Negative.    Psychiatric/Behavioral: Negative.        Physical Exam   Constitutional: She is oriented to person, place, and time. She appears well-developed and well-nourished.   Pleasant, cooperative no acute distress, blood pressure 120/80   HENT:   Head: Normocephalic and atraumatic.   Eyes: Conjunctivae are normal. No scleral icterus.   Neck: Normal range of motion. Neck supple.   Cardiovascular: Normal rate, regular rhythm and normal heart sounds.   Pulmonary/Chest: Effort normal and breath sounds normal. No respiratory distress. She has no wheezes. She has no rales.   Musculoskeletal: Normal range of motion. She exhibits no edema.   Neurological: She is alert and oriented to person, place, and time.   Skin: Skin is warm and dry.   Psychiatric: She has a normal mood and affect. Her behavior is normal.   Nursing note and vitals reviewed.      ASSESSMENT CBC has a low RBC count of 3.69, hemoglobin 10.4 hematocrit 32.4, probably secondary to her kidney disease.  CMP had an elevated sugar of 107, creatinine of 1.61 and was otherwise  normal.  TSH and free T4 were normal on supplement.  Lipid panel is stable with total cholesterol 230, HDL of 61 and .  Uric acid level is well controlled at 4.4.  #1-hypertension controlled  #2-hyperlipidemia, mild and asymptomatic  #3-hypothyroidism, euthyroid on replacement  #4-hyperglycemia, mild and asymptomatic  #5-chronic kidney disease stage III  #6-chronic anemia, stable  #7-history of gout, asymptomatic and controlled     Problem List Items Addressed This Visit        Cardiovascular and Mediastinum    Benign essential hypertension - Primary    PAF (paroxysmal atrial fibrillation) (CMS/HCC)    Inferior vena cava occlusion (CMS/HCC)       Digestive    Gastroesophageal reflux disease       Endocrine    Hypothyroidism       Nervous and Auditory    Fibromyalgia    Peripheral neuropathy       Genitourinary    Chronic renal impairment, stage 3 (moderate) (CMS/HCC)       Hematopoietic and Hemostatic    Anemia of chronic disorder       Other    Gout with tophus          PLAN the patient will continue current medicines as now and I recommended a flu shot for October.  I would like to recheck her in 6 months with a CBC, CMP, lipid panel, TSH and uric acid level and hemoglobin A1c    There are no Patient Instructions on file for this visit.  Return in about 6 months (around 1/2/2021) for with labs.

## 2020-07-08 ENCOUNTER — TELEPHONE (OUTPATIENT)
Dept: FAMILY MEDICINE CLINIC | Facility: CLINIC | Age: 70
End: 2020-07-08

## 2020-07-08 NOTE — TELEPHONE ENCOUNTER
IVA WITH [facility] CALLED IN TO REPORT OUT OF RANGE INR FOR YESTERDAY 7/7/20 AT 2.4.      PLEASE CHECK WITH DR AND SEE IF ANY CHANGES NEED TO BE MADE AND CONTACT PT TO ADVISED [phone]

## 2020-07-15 ENCOUNTER — TELEPHONE (OUTPATIENT)
Dept: FAMILY MEDICINE CLINIC | Facility: CLINIC | Age: 70
End: 2020-07-15

## 2020-07-15 NOTE — TELEPHONE ENCOUNTER
Tiffany with [facility] calling to report an abnormal INR.  INR: 2.3 taken yesterday 7/14/2020    Tiffany can be reached at [phone].

## 2020-07-22 ENCOUNTER — TELEPHONE (OUTPATIENT)
Dept: FAMILY MEDICINE CLINIC | Facility: CLINIC | Age: 70
End: 2020-07-22

## 2020-07-22 RX ORDER — PREDNISONE 1 MG/1
5 TABLET ORAL DAILY
Qty: 90 TABLET | Refills: 1 | Status: SHIPPED | OUTPATIENT
Start: 2020-07-22 | End: 2020-07-22 | Stop reason: SDUPTHER

## 2020-07-22 RX ORDER — TIZANIDINE 4 MG/1
TABLET ORAL
Qty: 180 TABLET | Refills: 1 | Status: SHIPPED | OUTPATIENT
Start: 2020-07-22 | End: 2020-07-22 | Stop reason: SDUPTHER

## 2020-07-22 NOTE — TELEPHONE ENCOUNTER
MD [facility] AND KATY CALLED TO REPORT PT'S RONA AND R RESULTS OF 2.3 FROM YESTERDAY.    CALLBACK NUMBER: [phone]

## 2020-07-22 NOTE — TELEPHONE ENCOUNTER
Caller: Alison Colvin    Relationship: Self    Best call back number: [phone]    Medication needed:   Requested Prescriptions     Pending Prescriptions Disp Refills   • tiZANidine (ZANAFLEX) 4 MG tablet 180 tablet 1     Sig: Take 1 tablet by mouth 2 (Two) Times a Day.   • predniSONE (DELTASONE) 5 MG tablet 90 tablet 1     Sig: Take 1 tablet by mouth Daily.       When do you need the refill by: 7/22/2020    What details did the patient provide when requesting the medication: Patient is out of medication.    Does the patient have less than a 3 day supply:  [x] Yes  [] No    What is the patient's preferred pharmacy: [facility] [address] [place] [address] [phone] [facility] [phone]

## 2020-07-23 RX ORDER — TIZANIDINE 4 MG/1
4 TABLET ORAL 2 TIMES DAILY
Qty: 180 TABLET | Refills: 1 | Status: SHIPPED | OUTPATIENT
Start: 2020-07-23 | End: 2021-01-06

## 2020-07-23 RX ORDER — PREDNISONE 1 MG/1
5 TABLET ORAL DAILY
Qty: 90 TABLET | Refills: 1 | Status: SHIPPED | OUTPATIENT
Start: 2020-07-23 | End: 2021-04-20

## 2020-07-29 ENCOUNTER — TELEPHONE (OUTPATIENT)
Dept: FAMILY MEDICINE CLINIC | Facility: CLINIC | Age: 70
End: 2020-07-29

## 2020-07-29 NOTE — TELEPHONE ENCOUNTER
Parvez from [facility] called to let our clinical staff know that the patient's INR results were 1.5 from yesterday (07/28/2020). Parvez also stated that he faxed the same results to the office.    Please advise.

## 2020-08-12 ENCOUNTER — TELEPHONE (OUTPATIENT)
Dept: FAMILY MEDICINE CLINIC | Facility: CLINIC | Age: 70
End: 2020-08-12

## 2020-08-26 ENCOUNTER — TELEPHONE (OUTPATIENT)
Dept: FAMILY MEDICINE CLINIC | Facility: CLINIC | Age: 70
End: 2020-08-26

## 2020-08-26 NOTE — TELEPHONE ENCOUNTER
April WITH [facility] CALLED IN OUT OF RAGE INR FOR PT OF 2.3 ON 8/25/20.  PLEASE CHECK WITH DR AND SEE IF HE WOULD LIKE TO MAKE ANY MEDICATION CHANGES.    PLEASE CONTACT PT TO ADVISE [phone]

## 2020-09-16 RX ORDER — ALLOPURINOL 300 MG/1
300 TABLET ORAL DAILY
Qty: 90 TABLET | Refills: 3 | Status: SHIPPED | OUTPATIENT
Start: 2020-09-16 | End: 2021-01-21 | Stop reason: SDUPTHER

## 2020-10-21 ENCOUNTER — TELEPHONE (OUTPATIENT)
Dept: FAMILY MEDICINE CLINIC | Facility: CLINIC | Age: 70
End: 2020-10-21

## 2020-10-21 NOTE — TELEPHONE ENCOUNTER
MANNIE WITH [facility] CALLED TO REPORT AN OUT OF RANGE INR FOR THE PATIENT.     IT WAS 2.4 ON 10/20/20.     CALLBACK # [phone]

## 2020-11-03 ENCOUNTER — TELEPHONE (OUTPATIENT)
Dept: FAMILY MEDICINE CLINIC | Facility: CLINIC | Age: 70
End: 2020-11-03

## 2020-11-03 RX ORDER — LEVOTHYROXINE SODIUM 0.05 MG/1
50 TABLET ORAL DAILY
Qty: 90 TABLET | Refills: 3 | Status: SHIPPED | OUTPATIENT
Start: 2020-11-03 | End: 2021-11-29

## 2020-11-03 RX ORDER — PANTOPRAZOLE SODIUM 40 MG/1
TABLET, DELAYED RELEASE ORAL
Qty: 180 TABLET | Refills: 3 | Status: SHIPPED | OUTPATIENT
Start: 2020-11-03 | End: 2021-11-29

## 2020-11-03 RX ORDER — DULOXETIN HYDROCHLORIDE 60 MG/1
60 CAPSULE, DELAYED RELEASE ORAL DAILY
Qty: 90 CAPSULE | Refills: 3 | Status: SHIPPED | OUTPATIENT
Start: 2020-11-03 | End: 2021-11-29

## 2020-11-03 RX ORDER — FUROSEMIDE 20 MG/1
20 TABLET ORAL DAILY
Qty: 90 TABLET | Refills: 3 | Status: SHIPPED | OUTPATIENT
Start: 2020-11-03 | End: 2021-11-29

## 2020-11-03 RX ORDER — POTASSIUM CHLORIDE 20 MEQ/1
TABLET, EXTENDED RELEASE ORAL
Qty: 180 TABLET | Refills: 3 | Status: SHIPPED | OUTPATIENT
Start: 2020-11-03 | End: 2021-11-29

## 2020-11-03 RX ORDER — TOPIRAMATE 25 MG/1
TABLET ORAL
Qty: 180 TABLET | Refills: 3 | Status: SHIPPED | OUTPATIENT
Start: 2020-11-03 | End: 2021-11-29

## 2020-11-03 NOTE — TELEPHONE ENCOUNTER
ADRIANA FROM [facility] CALLED TO REPORT PATIENTS INR OUT OF RANGE OF 2.3 ON 11/03/2020      PLEASE ADVISE  [phone] ADRIANA

## 2020-11-25 ENCOUNTER — TELEPHONE (OUTPATIENT)
Dept: FAMILY MEDICINE CLINIC | Facility: CLINIC | Age: 70
End: 2020-11-25

## 2020-11-25 NOTE — TELEPHONE ENCOUNTER
CHANDANA FROM [facility] IS CALLING IN WITH A INR FOR PT TAKEN YESTERDAY:    2.1    CHANDANA CALL BACK  [phone]

## 2020-12-04 ENCOUNTER — TELEPHONE (OUTPATIENT)
Dept: FAMILY MEDICINE CLINIC | Facility: CLINIC | Age: 70
End: 2020-12-04

## 2020-12-04 RX ORDER — AZITHROMYCIN 250 MG/1
TABLET, FILM COATED ORAL
Qty: 6 TABLET | Refills: 0 | Status: SHIPPED | OUTPATIENT
Start: 2020-12-04 | End: 2020-12-15

## 2020-12-04 NOTE — TELEPHONE ENCOUNTER
Caller: Alison Colvin    Relationship: Self    Best call back number: [phone]    What medication are you requesting: Antibiotics    What are your current symptoms: Red Hand after injury    How long have you been experiencing symptoms: Last night    Have you had these symptoms before:    [] Yes  [x] No    Have you been treated for these symptoms before:   [] Yes  [x] No    If a prescription is needed, what is your preferred pharmacy and phone number: [id number] [place] [facility] [address] [facility] [address] - [phone]  - [phone] FX     Additional notes: Patient fell and scraped her hand. Said it is getting rid and thinks might be infected. Said [place] miles from [place] and would like to see if doctor could get her an antibiotic without her coming in.

## 2020-12-15 ENCOUNTER — OFFICE VISIT (OUTPATIENT)
Dept: FAMILY MEDICINE CLINIC | Facility: CLINIC | Age: 70
End: 2020-12-15

## 2020-12-15 DIAGNOSIS — R39.198 DIFFICULTY URINATING: ICD-10-CM

## 2020-12-15 DIAGNOSIS — R34 DECREASED URINE OUTPUT: Primary | ICD-10-CM

## 2020-12-15 PROCEDURE — 99443 PR PHYS/QHP TELEPHONE EVALUATION 21-30 MIN: CPT | Performed by: NURSE PRACTITIONER

## 2020-12-15 NOTE — PROGRESS NOTES
Subjective     Alison Colvin is a 70 y.o.. female.     This was an audio enabled telemedicine encounter.  You have chosen to receive care through a telephone visit. Do you consent to use a telephone visit for your medical care today? Yes    Urinary Retention  This is a new problem. Episode onset: 1 1/2 weeks. The problem has been unchanged. Pertinent negatives include no chest pain, coughing, fever, headaches, nausea or vomiting.       The following portions of the patient's history were reviewed and updated as appropriate: allergies, current medications, past family history, past medical history, past social history, past surgical history and problem list.    Past Medical History:   Diagnosis Date   • Abnormal nuclear stress test    • Acute sinusitis    • Allergy    • Benign essential hypertension    • Cellulitis    • Chest pain    • Colon cancer (CMS/HCC)    • COPD (chronic obstructive pulmonary disease) (CMS/HCC)    • Disease of thyroid gland    • Edema of leg    • Epilepsy (CMS/HCC)    • Esophageal reflux    • Foot pain    • Myalgia and myositis    • Onychomycosis of toenail    • Transient cerebral ischemia    • URI (upper respiratory infection)        Past Surgical History:   Procedure Laterality Date   • CATARACT EXTRACTION     • COLON SURGERY     • COLONOSCOPY     • ENDOSCOPY N/A 6/17/2016    Procedure: ESOPHAGOGASTRODUODENOSCOPY with biopsy and viral culture;  Surgeon: Presley Cornelius MD;  Location: [facility] [place];  Service:    • HIP SURGERY     • HYSTERECTOMY         Review of Systems   Constitutional: Negative for fever.   Respiratory: Negative for cough and shortness of breath.    Cardiovascular: Negative for chest pain, palpitations and leg swelling.   Gastrointestinal: Negative for constipation, diarrhea, nausea and vomiting.   Genitourinary: Positive for decreased urine volume and difficulty urinating. Negative for dysuria, flank pain, frequency, hematuria and urgency.   Neurological:  Negative for dizziness, light-headedness and headaches.       Allergies   Allergen Reactions   • Pneumococcal Vaccines Delirium     Fever, chills   • Asa [Aspirin]    • Levofloxacin    • Meperidine Other (See Comments)   • Naproxen    • Pregabalin Other (See Comments)   • Propoxyphene    • Sulfa Antibiotics Other (See Comments)       Objective     There were no vitals filed for this visit.; telephone visit  There is no height or weight on file to calculate BMI.; telephone visit    Physical Exam; telephone visit      Current Outpatient Medications:   •  allopurinol (ZYLOPRIM) 300 MG tablet, TAKE 1 TABLET BY MOUTH  DAILY, Disp: 90 tablet, Rfl: 3  •  baclofen (LIORESAL) 10 MG tablet, TAKE 1 TABLET BY MOUTH TWO  TIMES DAILY, Disp: 180 tablet, Rfl: 1  •  BROVANA 15 MCG/2ML nebulizer solution, USE 1 VIAL PER NEBULIZER BID, Disp: , Rfl: 5  •  budesonide (PULMICORT) 1 MG/2ML nebulizer solution, Pulmicort SUSP; Patient Sig: Pulmicort SUSP USE 1 UNIT DOSE VIA NEBULIZER TWICE DAILY; 0; 25-Mar-2013; Active, Disp: , Rfl:   •  calcium carbonate (OS-SEBASTIÁN) 600 MG tablet, Take 600 mg by mouth 2 (two) times a day with meals., Disp: , Rfl:   •  DULoxetine (CYMBALTA) 60 MG capsule, TAKE 1 CAPSULE BY MOUTH  DAILY, Disp: 90 capsule, Rfl: 3  •  furosemide (LASIX) 20 MG tablet, TAKE 1 TABLET BY MOUTH  DAILY, Disp: 90 tablet, Rfl: 3  •  HYDROcodone-acetaminophen (NORCO)  MG per tablet, TK 1 T PO Q 8 H PRF PAIN, Disp: , Rfl: 0  •  ipratropium-albuterol (DUO-NEB) 0.5-2.5 mg/mL nebulizer, USE 1 VIAL PER NEBULIZER QID, Disp: , Rfl: 5  •  levothyroxine (SYNTHROID, LEVOTHROID) 50 MCG tablet, TAKE 1 TABLET BY MOUTH  DAILY, Disp: 90 tablet, Rfl: 3  •  metoclopramide (REGLAN) 10 MG tablet, TAKE 1 TABLET BY MOUTH 4  TIMES DAILY, Disp: 360 tablet, Rfl: 1  •  Multiple Vitamins-Minerals (WOMENS 50+ MULTI VITAMIN/MIN PO), Take 1 tablet by mouth daily., Disp: , Rfl:   •  pantoprazole (PROTONIX) 40 MG EC tablet, TAKE 1 TABLET BY MOUTH TWO  TIMES  DAILY, Disp: 180 tablet, Rfl: 3  •  potassium chloride (K-DUR,KLOR-CON) 20 MEQ CR tablet, TAKE 1 TABLET BY MOUTH  TWICE DAILY, Disp: 180 tablet, Rfl: 3  •  predniSONE (DELTASONE) 5 MG tablet, Take 1 tablet by mouth Daily., Disp: 90 tablet, Rfl: 1  •  tiZANidine (ZANAFLEX) 4 MG tablet, Take 1 tablet by mouth 2 (Two) Times a Day., Disp: 180 tablet, Rfl: 1  •  topiramate (TOPAMAX) 25 MG tablet, TAKE 1 TABLET BY MOUTH  TWICE DAILY, Disp: 180 tablet, Rfl: 3  •  warfarin (COUMADIN) 4 MG tablet, TAKE 1 TABLET BY MOUTH AS NEEDED ON MONDAY AND FRIDAYS, Disp: 93 tablet, Rfl: 3  •  warfarin (COUMADIN) 5 MG tablet, Alternate 5 mg with 2.5 mg every other day, Disp: 90 tablet, Rfl: 3  •  nystatin (MYCOSTATIN) 922826 UNIT/GM powder, Apply  topically to the appropriate area as directed Every 12 (Twelve) Hours., Disp: 15 g, Rfl: 0  •  OXYGEN-HELIUM IN, Inhale., Disp: , Rfl:     Time: 30 min.    Assessment/Plan   Diagnoses and all orders for this visit:    1. Decreased urine output (Primary)  -     Urinalysis With Culture If Indicated - Urine, Clean Catch; Future  -     CBC & Differential; Future  -     Comprehensive metabolic panel; Future    2. Difficulty urinating  -     Urinalysis With Culture If Indicated - Urine, Clean Catch; Future  -     CBC & Differential; Future  -     Comprehensive metabolic panel; Future        Patient Instructions   Decreased urine output/difficulty urinating: pt informed she needs to drink more water, needs to go to an outpatient [facility] or [facility] facility and have labs done (cbc, cmp, u/a with reflex culture if needed) asap; if going to take longer than 24-48 hours to have labs done should follow up with in person visit in office. If symptoms worsens, if having fever, n/v, abd pain or urine retention worsens need to go to ER for further eval. Pt verb. Understanding.       Return if symptoms worsen or fail to improve.

## 2020-12-15 NOTE — PATIENT INSTRUCTIONS
Decreased urine output/difficulty urinating: pt informed she needs to drink more water, needs to go to an outpatient [facility] or [facility] facility and have labs done (cbc, cmp, u/a with reflex culture if needed) asap; if going to take longer than 24-48 hours to have labs done should follow up with in person visit in office. If symptoms worsens, if having fever, n/v, abd pain or urine retention worsens need to go to ER for further eval. Pt verb. Understanding.

## 2020-12-16 ENCOUNTER — LAB (OUTPATIENT)
Dept: LAB | Facility: HOSPITAL | Age: 70
End: 2020-12-16

## 2020-12-16 DIAGNOSIS — R34 DECREASED URINE OUTPUT: ICD-10-CM

## 2020-12-16 DIAGNOSIS — R39.198 DIFFICULTY URINATING: ICD-10-CM

## 2020-12-16 LAB
ALBUMIN SERPL-MCNC: 4.2 G/DL (ref 3.5–5.2)
ALBUMIN/GLOB SERPL: 1.4 G/DL
ALP SERPL-CCNC: 54 U/L (ref 39–117)
ALT SERPL W P-5'-P-CCNC: 15 U/L (ref 1–33)
ANION GAP SERPL CALCULATED.3IONS-SCNC: 11.8 MMOL/L (ref 5–15)
AST SERPL-CCNC: 23 U/L (ref 1–32)
BACTERIA UR QL AUTO: ABNORMAL /HPF
BASOPHILS # BLD AUTO: 0.07 10*3/MM3 (ref 0–0.2)
BASOPHILS NFR BLD AUTO: 0.9 % (ref 0–1.5)
BILIRUB SERPL-MCNC: 0.3 MG/DL (ref 0–1.2)
BILIRUB UR QL STRIP: NEGATIVE
BUN SERPL-MCNC: 16 MG/DL (ref 8–23)
BUN/CREAT SERPL: 10.7 (ref 7–25)
CALCIUM SPEC-SCNC: 9.4 MG/DL (ref 8.6–10.5)
CHLORIDE SERPL-SCNC: 100 MMOL/L (ref 98–107)
CLARITY UR: CLEAR
CO2 SERPL-SCNC: 26.2 MMOL/L (ref 22–29)
COLOR UR: YELLOW
CREAT SERPL-MCNC: 1.49 MG/DL (ref 0.57–1)
DEPRECATED RDW RBC AUTO: 48.5 FL (ref 37–54)
EOSINOPHIL # BLD AUTO: 0.21 10*3/MM3 (ref 0–0.4)
EOSINOPHIL NFR BLD AUTO: 2.6 % (ref 0.3–6.2)
ERYTHROCYTE [DISTWIDTH] IN BLOOD BY AUTOMATED COUNT: 16.4 % (ref 12.3–15.4)
GFR SERPL CREATININE-BSD FRML MDRD: 34 ML/MIN/1.73
GLOBULIN UR ELPH-MCNC: 3 GM/DL
GLUCOSE SERPL-MCNC: 96 MG/DL (ref 65–99)
GLUCOSE UR STRIP-MCNC: NEGATIVE MG/DL
HCT VFR BLD AUTO: 33 % (ref 34–46.6)
HGB BLD-MCNC: 10.2 G/DL (ref 12–15.9)
HGB UR QL STRIP.AUTO: ABNORMAL
HYALINE CASTS UR QL AUTO: ABNORMAL /LPF
IMM GRANULOCYTES # BLD AUTO: 0.08 10*3/MM3 (ref 0–0.05)
IMM GRANULOCYTES NFR BLD AUTO: 1 % (ref 0–0.5)
KETONES UR QL STRIP: NEGATIVE
LEUKOCYTE ESTERASE UR QL STRIP.AUTO: NEGATIVE
LYMPHOCYTES # BLD AUTO: 0.61 10*3/MM3 (ref 0.7–3.1)
LYMPHOCYTES NFR BLD AUTO: 7.5 % (ref 19.6–45.3)
MCH RBC QN AUTO: 25.6 PG (ref 26.6–33)
MCHC RBC AUTO-ENTMCNC: 30.9 G/DL (ref 31.5–35.7)
MCV RBC AUTO: 82.9 FL (ref 79–97)
MONOCYTES # BLD AUTO: 0.94 10*3/MM3 (ref 0.1–0.9)
MONOCYTES NFR BLD AUTO: 11.5 % (ref 5–12)
NEUTROPHILS NFR BLD AUTO: 6.27 10*3/MM3 (ref 1.7–7)
NEUTROPHILS NFR BLD AUTO: 76.5 % (ref 42.7–76)
NITRITE UR QL STRIP: NEGATIVE
NRBC BLD AUTO-RTO: 0 /100 WBC (ref 0–0.2)
PH UR STRIP.AUTO: 6 [PH] (ref 5–8)
PLATELET # BLD AUTO: 158 10*3/MM3 (ref 140–450)
PMV BLD AUTO: 10.8 FL (ref 6–12)
POTASSIUM SERPL-SCNC: 3.7 MMOL/L (ref 3.5–5.2)
PROT SERPL-MCNC: 7.2 G/DL (ref 6–8.5)
PROT UR QL STRIP: NEGATIVE
RBC # BLD AUTO: 3.98 10*6/MM3 (ref 3.77–5.28)
RBC # UR: ABNORMAL /HPF
REF LAB TEST METHOD: ABNORMAL
SODIUM SERPL-SCNC: 138 MMOL/L (ref 136–145)
SP GR UR STRIP: 1.01 (ref 1–1.03)
SQUAMOUS #/AREA URNS HPF: ABNORMAL /HPF
UROBILINOGEN UR QL STRIP: ABNORMAL
WBC # BLD AUTO: 8.18 10*3/MM3 (ref 3.4–10.8)
WBC UR QL AUTO: ABNORMAL /HPF

## 2020-12-16 PROCEDURE — 81001 URINALYSIS AUTO W/SCOPE: CPT

## 2020-12-16 PROCEDURE — 85025 COMPLETE CBC W/AUTO DIFF WBC: CPT

## 2020-12-16 PROCEDURE — 36415 COLL VENOUS BLD VENIPUNCTURE: CPT

## 2020-12-16 PROCEDURE — 80053 COMPREHEN METABOLIC PANEL: CPT

## 2020-12-29 ENCOUNTER — TELEPHONE (OUTPATIENT)
Dept: FAMILY MEDICINE CLINIC | Facility: CLINIC | Age: 70
End: 2020-12-29

## 2020-12-29 NOTE — TELEPHONE ENCOUNTER
JES FROM [facility] CALLED. PT INR WAS 3.8  FOR 12/29/20.   Work excuse redone on letterhead and faxed to Jahaira at number listed below

## 2021-01-06 ENCOUNTER — TELEPHONE (OUTPATIENT)
Dept: FAMILY MEDICINE CLINIC | Facility: CLINIC | Age: 71
End: 2021-01-06

## 2021-01-06 RX ORDER — TIZANIDINE 4 MG/1
TABLET ORAL
Qty: 180 TABLET | Refills: 1 | Status: SHIPPED | OUTPATIENT
Start: 2021-01-06 | End: 2021-04-20

## 2021-01-06 RX ORDER — METOCLOPRAMIDE 10 MG/1
TABLET ORAL
Qty: 360 TABLET | Refills: 1 | Status: SHIPPED | OUTPATIENT
Start: 2021-01-06 | End: 2021-04-20

## 2021-01-06 RX ORDER — BACLOFEN 10 MG/1
TABLET ORAL
Qty: 180 TABLET | Refills: 1 | Status: SHIPPED | OUTPATIENT
Start: 2021-01-06 | End: 2021-04-20

## 2021-01-13 DIAGNOSIS — M1A.9XX1 GOUT WITH TOPHUS: ICD-10-CM

## 2021-01-13 DIAGNOSIS — R73.9 BLOOD GLUCOSE ELEVATED: ICD-10-CM

## 2021-01-13 DIAGNOSIS — E03.9 ACQUIRED HYPOTHYROIDISM: ICD-10-CM

## 2021-01-13 DIAGNOSIS — E78.5 HYPERLIPIDEMIA, UNSPECIFIED HYPERLIPIDEMIA TYPE: ICD-10-CM

## 2021-01-13 NOTE — TELEPHONE ENCOUNTER
JULIANA WITH [facility] CALLED AND STATES THAT SHE NEEDS TO REPORT AN OUT OF RANGE STATIS FOR PATIENT RESULTS WAS A  2.4 AS OF 1/12.      PLEASE ADVISE PATIENT WITH ANY CHANGES IN THE DOSAGE OR INSTRUCTIONS. [phone]

## 2021-01-15 LAB
ALBUMIN SERPL-MCNC: 4.1 G/DL (ref 3.5–5.2)
ALBUMIN/GLOB SERPL: 1.8 G/DL
ALP SERPL-CCNC: 53 U/L (ref 39–117)
ALT SERPL-CCNC: 7 U/L (ref 1–33)
AST SERPL-CCNC: 16 U/L (ref 1–32)
BASOPHILS # BLD AUTO: 0.08 10*3/MM3 (ref 0–0.2)
BASOPHILS NFR BLD AUTO: 0.9 % (ref 0–1.5)
BILIRUB SERPL-MCNC: 0.3 MG/DL (ref 0–1.2)
BUN SERPL-MCNC: 22 MG/DL (ref 8–23)
BUN/CREAT SERPL: 14.4 (ref 7–25)
CALCIUM SERPL-MCNC: 9.2 MG/DL (ref 8.6–10.5)
CHLORIDE SERPL-SCNC: 101 MMOL/L (ref 98–107)
CHOLEST SERPL-MCNC: 201 MG/DL (ref 0–200)
CO2 SERPL-SCNC: 26.5 MMOL/L (ref 22–29)
CREAT SERPL-MCNC: 1.53 MG/DL (ref 0.57–1)
EOSINOPHIL # BLD AUTO: 0.28 10*3/MM3 (ref 0–0.4)
EOSINOPHIL NFR BLD AUTO: 3.2 % (ref 0.3–6.2)
ERYTHROCYTE [DISTWIDTH] IN BLOOD BY AUTOMATED COUNT: 16.4 % (ref 12.3–15.4)
GLOBULIN SER CALC-MCNC: 2.3 GM/DL
GLUCOSE SERPL-MCNC: 94 MG/DL (ref 65–99)
HBA1C MFR BLD: 5.1 % (ref 4.8–5.6)
HCT VFR BLD AUTO: 32 % (ref 34–46.6)
HDLC SERPL-MCNC: 53 MG/DL (ref 40–60)
HGB BLD-MCNC: 10.1 G/DL (ref 12–15.9)
IMM GRANULOCYTES # BLD AUTO: 0.11 10*3/MM3 (ref 0–0.05)
IMM GRANULOCYTES NFR BLD AUTO: 1.3 % (ref 0–0.5)
LDLC SERPL CALC-MCNC: 108 MG/DL (ref 0–100)
LDLC/HDLC SERPL: 1.92 {RATIO}
LYMPHOCYTES # BLD AUTO: 1.6 10*3/MM3 (ref 0.7–3.1)
LYMPHOCYTES NFR BLD AUTO: 18.4 % (ref 19.6–45.3)
MCH RBC QN AUTO: 26.5 PG (ref 26.6–33)
MCHC RBC AUTO-ENTMCNC: 31.6 G/DL (ref 31.5–35.7)
MCV RBC AUTO: 84 FL (ref 79–97)
MONOCYTES # BLD AUTO: 0.75 10*3/MM3 (ref 0.1–0.9)
MONOCYTES NFR BLD AUTO: 8.6 % (ref 5–12)
NEUTROPHILS # BLD AUTO: 5.87 10*3/MM3 (ref 1.7–7)
NEUTROPHILS NFR BLD AUTO: 67.6 % (ref 42.7–76)
NRBC BLD AUTO-RTO: 0 /100 WBC (ref 0–0.2)
PLATELET # BLD AUTO: 154 10*3/MM3 (ref 140–450)
POTASSIUM SERPL-SCNC: 4.5 MMOL/L (ref 3.5–5.2)
PROT SERPL-MCNC: 6.4 G/DL (ref 6–8.5)
RBC # BLD AUTO: 3.81 10*6/MM3 (ref 3.77–5.28)
SODIUM SERPL-SCNC: 138 MMOL/L (ref 136–145)
TRIGL SERPL-MCNC: 232 MG/DL (ref 0–150)
TSH SERPL DL<=0.005 MIU/L-ACNC: 2.18 UIU/ML (ref 0.27–4.2)
URATE SERPL-MCNC: 3.9 MG/DL (ref 2.4–5.7)
VLDLC SERPL CALC-MCNC: 40 MG/DL (ref 5–40)
WBC # BLD AUTO: 8.69 10*3/MM3 (ref 3.4–10.8)

## 2021-01-19 LAB — INR PPP: 1.4

## 2021-01-21 ENCOUNTER — OFFICE VISIT (OUTPATIENT)
Dept: FAMILY MEDICINE CLINIC | Facility: CLINIC | Age: 71
End: 2021-01-21

## 2021-01-21 VITALS
OXYGEN SATURATION: 94 % | RESPIRATION RATE: 17 BRPM | SYSTOLIC BLOOD PRESSURE: 137 MMHG | TEMPERATURE: 96.9 F | DIASTOLIC BLOOD PRESSURE: 85 MMHG | HEIGHT: 62 IN | HEART RATE: 85 BPM | BODY MASS INDEX: 42.07 KG/M2

## 2021-01-21 DIAGNOSIS — I48.0 PAF (PAROXYSMAL ATRIAL FIBRILLATION) (HCC): ICD-10-CM

## 2021-01-21 DIAGNOSIS — Z95.828 HISTORY OF INFERIOR VENA CAVAL FILTER PLACEMENT: ICD-10-CM

## 2021-01-21 DIAGNOSIS — D63.8 ANEMIA OF CHRONIC DISORDER: ICD-10-CM

## 2021-01-21 DIAGNOSIS — I10 BENIGN ESSENTIAL HYPERTENSION: Primary | ICD-10-CM

## 2021-01-21 DIAGNOSIS — E03.9 ACQUIRED HYPOTHYROIDISM: ICD-10-CM

## 2021-01-21 DIAGNOSIS — N18.30 CHRONIC RENAL IMPAIRMENT, STAGE 3 (MODERATE), UNSPECIFIED WHETHER STAGE 3A OR 3B CKD (HCC): ICD-10-CM

## 2021-01-21 DIAGNOSIS — M1A.9XX1 GOUT WITH TOPHUS: ICD-10-CM

## 2021-01-21 PROCEDURE — 99214 OFFICE O/P EST MOD 30 MIN: CPT | Performed by: INTERNAL MEDICINE

## 2021-01-21 RX ORDER — ALLOPURINOL 100 MG/1
100 TABLET ORAL DAILY
Qty: 90 TABLET | Refills: 3 | Status: SHIPPED | OUTPATIENT
Start: 2021-01-21 | End: 2022-01-11 | Stop reason: SDUPTHER

## 2021-01-21 NOTE — PROGRESS NOTES
Subjective   Alison Colvin is a 70 y.o. female. Patient is here today for follow-up on her hypertension, paroxysmal atrial fibrillation, hypothyroidism, chronic kidney disease stage III, anemia of chronic disease, gout and COPD.  The patient's generally been stable and is in a wheelchair.  She did have COVID-19 infection but has recovered from it.  She denies any acute symptoms currently  Chief Complaint   Patient presents with   • Hyperlipidemia     Lab F/u   • Hypothyroidism   • Hyperglycemia   • Gout          Vitals:    01/21/21 1412   BP: 137/85   Pulse: 85   Resp: 17   Temp: 96.9 °F (36.1 °C)   SpO2: 94%     Body mass index is 42.07 kg/m².  The following portions of the patient's history were reviewed and updated as appropriate: allergies, current medications, past family history, past medical history, past social history, past surgical history and problem list.    Past Medical History:   Diagnosis Date   • Abnormal nuclear stress test    • Acute sinusitis    • Allergy    • Benign essential hypertension    • Cellulitis    • Chest pain    • Colon cancer (CMS/HCC)    • COPD (chronic obstructive pulmonary disease) (CMS/HCC)    • Disease of thyroid gland    • Edema of leg    • Epilepsy (CMS/HCC)    • Esophageal reflux    • Foot pain    • Myalgia and myositis    • Onychomycosis of toenail    • Transient cerebral ischemia    • URI (upper respiratory infection)       Allergies   Allergen Reactions   • Pneumococcal Vaccines Delirium     Fever, chills   • Asa [Aspirin]    • Levofloxacin    • Meperidine Other (See Comments)   • Naproxen    • Pregabalin Other (See Comments)   • Propoxyphene    • Sulfa Antibiotics Other (See Comments)      Social History     Socioeconomic History   • Marital status:      Spouse name: Not on file   • Number of children: Not on file   • Years of education: Not on file   • Highest education level: Not on file   Tobacco Use   • Smoking status: Never Smoker   • Smokeless tobacco: Never  Used   Substance and Sexual Activity   • Alcohol use: No   • Drug use: No        Current Outpatient Medications:   •  allopurinol (ZYLOPRIM) 100 MG tablet, Take 1 tablet by mouth Daily., Disp: 90 tablet, Rfl: 3  •  baclofen (LIORESAL) 10 MG tablet, TAKE 1 TABLET BY MOUTH TWO  TIMES DAILY, Disp: 180 tablet, Rfl: 1  •  BROVANA 15 MCG/2ML nebulizer solution, USE 1 VIAL PER NEBULIZER BID, Disp: , Rfl: 5  •  budesonide (PULMICORT) 1 MG/2ML nebulizer solution, Pulmicort SUSP; Patient Sig: Pulmicort SUSP USE 1 UNIT DOSE VIA NEBULIZER TWICE DAILY; 0; 25-Mar-2013; Active, Disp: , Rfl:   •  calcium carbonate (OS-SEBASTIÁN) 600 MG tablet, Take 600 mg by mouth 2 (two) times a day with meals., Disp: , Rfl:   •  DULoxetine (CYMBALTA) 60 MG capsule, TAKE 1 CAPSULE BY MOUTH  DAILY, Disp: 90 capsule, Rfl: 3  •  furosemide (LASIX) 20 MG tablet, TAKE 1 TABLET BY MOUTH  DAILY, Disp: 90 tablet, Rfl: 3  •  HYDROcodone-acetaminophen (NORCO)  MG per tablet, TK 1 T PO Q 8 H PRF PAIN, Disp: , Rfl: 0  •  ipratropium-albuterol (DUO-NEB) 0.5-2.5 mg/mL nebulizer, USE 1 VIAL PER NEBULIZER QID, Disp: , Rfl: 5  •  levothyroxine (SYNTHROID, LEVOTHROID) 50 MCG tablet, TAKE 1 TABLET BY MOUTH  DAILY, Disp: 90 tablet, Rfl: 3  •  metoclopramide (REGLAN) 10 MG tablet, TAKE 1 TABLET BY MOUTH 4  TIMES DAILY, Disp: 360 tablet, Rfl: 1  •  Multiple Vitamins-Minerals (WOMENS 50+ MULTI VITAMIN/MIN PO), Take 1 tablet by mouth daily., Disp: , Rfl:   •  nystatin (MYCOSTATIN) [id number] UNIT/GM powder, Apply  topically to the appropriate area as directed Every 12 (Twelve) Hours., Disp: 15 g, Rfl: 0  •  OXYGEN-HELIUM IN, Inhale., Disp: , Rfl:   •  pantoprazole (PROTONIX) 40 MG EC tablet, TAKE 1 TABLET BY MOUTH TWO  TIMES DAILY, Disp: 180 tablet, Rfl: 3  •  potassium chloride (K-DUR,KLOR-CON) 20 MEQ CR tablet, TAKE 1 TABLET BY MOUTH  TWICE DAILY, Disp: 180 tablet, Rfl: 3  •  predniSONE (DELTASONE) 5 MG tablet, Take 1 tablet by mouth Daily., Disp: 90 tablet, Rfl: 1  •   tiZANidine (ZANAFLEX) 4 MG tablet, TAKE 1 TABLET BY MOUTH  TWICE DAILY, Disp: 180 tablet, Rfl: 1  •  topiramate (TOPAMAX) 25 MG tablet, TAKE 1 TABLET BY MOUTH  TWICE DAILY, Disp: 180 tablet, Rfl: 3  •  warfarin (COUMADIN) 4 MG tablet, TAKE 1 TABLET BY MOUTH AS NEEDED ON MONDAY AND FRIDAYS, Disp: 93 tablet, Rfl: 3  •  warfarin (COUMADIN) 5 MG tablet, Alternate 5 mg with 2.5 mg every other day, Disp: 90 tablet, Rfl: 3     Objective     History of Present Illness     Review of Systems   Constitutional: Negative.    HENT: Negative.    Respiratory: Negative.    Cardiovascular: Negative.    Gastrointestinal: Negative.    Genitourinary: Negative.    Musculoskeletal: Negative.    Skin: Negative.    Neurological: Negative.    Hematological: Negative.    Psychiatric/Behavioral: Negative.        Physical Exam  Vitals signs and nursing note reviewed.   Constitutional:       General: She is not in acute distress.     Appearance: Normal appearance. She is obese. She is not ill-appearing.   HENT:      Head: Normocephalic and atraumatic.   Eyes:      General: No scleral icterus.     Conjunctiva/sclera: Conjunctivae normal.   Neck:      Musculoskeletal: Normal range of motion and neck supple.   Cardiovascular:      Rate and Rhythm: Normal rate and regular rhythm.      Heart sounds: Normal heart sounds.   Pulmonary:      Effort: Pulmonary effort is normal. No respiratory distress.      Breath sounds: Normal breath sounds. No wheezing or rales.   Musculoskeletal: Normal range of motion.   Skin:     General: Skin is warm and dry.   Neurological:      General: No focal deficit present.      Mental Status: She is alert and oriented to person, place, and time.   Psychiatric:         Mood and Affect: Mood normal.         Behavior: Behavior normal.         ASSESSMENT CBC is stable with a normal white cell count, hemoglobin 10.1 hematocrit 32.  CMP had a normal sugar, creatinine 1.53 that stable and is otherwise normal.  Lipid panel stable  with total cholesterol 201, HDL 53 and  and hemoglobin A1c is normal at 5.1.  TSH was normal and uric acid level is well controlled at 3.9.  #1-hypertension, controlled on medication  #2-paroxysmal atrial fibrillation, regular rhythm today  #3-hypothyroidism, euthyroid on replacement  #4-chronic kidney disease stage III, stable  #5-anemia of chronic disease, stable  #6-gout, controlled on medication     Problems Addressed this Visit        Cardiac and Vasculature    Benign essential hypertension - Primary    PAF (paroxysmal atrial fibrillation) (CMS/[facility])       Coag and Thromboembolic    History of inferior vena caval filter placement       Endocrine and Metabolic    Hypothyroidism       Genitourinary and Reproductive     Chronic renal impairment, stage 3 (moderate)       Musculoskeletal and Injuries    Gout with tophus      Diagnoses       Codes Comments    Benign essential hypertension    -  Primary ICD-10-CM: I10  ICD-9-CM: 401.1     PAF (paroxysmal atrial fibrillation) (CMS/[facility])     ICD-10-CM: I48.0  ICD-9-CM: 427.31     History of inferior vena caval filter placement     ICD-10-CM: Z95.828  ICD-9-CM: V43.4     Acquired hypothyroidism     ICD-10-CM: E03.9  ICD-9-CM: 244.9     Chronic renal impairment, stage 3 (moderate), unspecified whether stage 3a or 3b CKD     ICD-10-CM: N18.30  ICD-9-CM: 585.3     Gout with tophus     ICD-10-CM: M1A.9XX1  ICD-9-CM: 274.82           PLAN the patient will continue current medicines as now and I will recheck her in 6 months with a CBC, CMP, lipid panel, TSH and free T4 and vitamin D level and a hepatitis C screen per protocol    There are no Patient Instructions on file for this visit.  Return in about 6 months (around 7/21/2021) for with labs.

## 2021-01-22 RX ORDER — WARFARIN SODIUM 4 MG/1
TABLET ORAL
Qty: 93 TABLET | Refills: 3 | Status: SHIPPED | OUTPATIENT
Start: 2021-01-22 | End: 2022-01-11 | Stop reason: SDUPTHER

## 2021-01-26 ENCOUNTER — TELEPHONE (OUTPATIENT)
Dept: FAMILY MEDICINE CLINIC | Facility: CLINIC | Age: 71
End: 2021-01-26

## 2021-02-10 ENCOUNTER — TELEPHONE (OUTPATIENT)
Dept: FAMILY MEDICINE CLINIC | Facility: CLINIC | Age: 71
End: 2021-02-10

## 2021-03-03 ENCOUNTER — TELEPHONE (OUTPATIENT)
Dept: FAMILY MEDICINE CLINIC | Facility: CLINIC | Age: 71
End: 2021-03-03

## 2021-03-03 NOTE — TELEPHONE ENCOUNTER
Caller: LAB    Relationship to patient: Other    Best call back number: [phone]    Patient is needing: PATIENT'S INR LEVEL YESTERDAY, 03/02/21 WAS 1.8

## 2021-03-10 ENCOUNTER — TELEPHONE (OUTPATIENT)
Dept: FAMILY MEDICINE CLINIC | Facility: CLINIC | Age: 71
End: 2021-03-10

## 2021-04-07 ENCOUNTER — TELEPHONE (OUTPATIENT)
Dept: FAMILY MEDICINE CLINIC | Facility: CLINIC | Age: 71
End: 2021-04-07

## 2021-04-07 DIAGNOSIS — Z12.31 ENCOUNTER FOR SCREENING MAMMOGRAM FOR BREAST CANCER: Primary | ICD-10-CM

## 2021-04-09 ENCOUNTER — TRANSCRIBE ORDERS (OUTPATIENT)
Dept: ADMINISTRATIVE | Facility: HOSPITAL | Age: 71
End: 2021-04-09

## 2021-04-09 DIAGNOSIS — Z12.31 ENCOUNTER FOR SCREENING MAMMOGRAM FOR MALIGNANT NEOPLASM OF BREAST: Primary | ICD-10-CM

## 2021-04-12 NOTE — TELEPHONE ENCOUNTER
Caller: Marii Alison NATALIIA    Relationship: Self    Best call back number: [phone]    What medication are you requesting: ANTIBIOTICS     What are your current symptoms:  RED, SWOLLEN PINKY FINGER ON HER LEFT HAND LOOKS INFECTED. PATIENT STATES SHE ACCIDENTALLY STABBED IT WITH A FORK.     How long have you been experiencing symptoms: SINCE LAST NIGHT    Have you had these symptoms before:    [] Yes  [x] No    Have you been treated for these symptoms before:   [] Yes  [x] No    If a prescription is needed, what is your preferred pharmacy and phone number:  [facility] IN [place], # [phone]    Additional notes: PATIENT DOES NOT WANT AN EXPENSIVE ANTIBIOTIC, IF POSSIBLE.

## 2021-04-13 RX ORDER — AZITHROMYCIN 250 MG/1
TABLET, FILM COATED ORAL
Qty: 6 TABLET | Refills: 0 | Status: SHIPPED | OUTPATIENT
Start: 2021-04-13 | End: 2021-07-28

## 2021-04-13 RX ORDER — AZITHROMYCIN 250 MG/1
TABLET, FILM COATED ORAL
Qty: 6 TABLET | Refills: 0 | Status: SHIPPED | OUTPATIENT
Start: 2021-04-13 | End: 2021-04-13 | Stop reason: SDUPTHER

## 2021-04-13 RX ORDER — AZITHROMYCIN 250 MG/1
TABLET, FILM COATED ORAL
Qty: 6 TABLET | Refills: 0 | Status: CANCELLED | OUTPATIENT
Start: 2021-04-13

## 2021-04-13 NOTE — TELEPHONE ENCOUNTER
Caller: Alison Colvin    Relationship to patient: Self    Best call back number: [phone]     Patient is needing: PATIENT IS CALLING BACK ABOUT HER INCIDENT AND REQUESTING AN ANTIBIOTIC. SHE IS NEEDING TO KNOW IF SOMETHING IS GOING TO BE CALLED IN OR NOT. PLEASE ADVISE.

## 2021-04-14 ENCOUNTER — TELEPHONE (OUTPATIENT)
Dept: FAMILY MEDICINE CLINIC | Facility: CLINIC | Age: 71
End: 2021-04-14

## 2021-04-19 ENCOUNTER — HOSPITAL ENCOUNTER (OUTPATIENT)
Dept: MAMMOGRAPHY | Facility: HOSPITAL | Age: 71
Discharge: HOME | End: 2021-04-19
Admitting: NURSE PRACTITIONER

## 2021-04-19 DIAGNOSIS — Z12.31 ENCOUNTER FOR SCREENING MAMMOGRAM FOR MALIGNANT NEOPLASM OF BREAST: ICD-10-CM

## 2021-04-19 PROCEDURE — 77067 SCR MAMMO BI INCL CAD: CPT

## 2021-04-19 PROCEDURE — 77063 BREAST TOMOSYNTHESIS BI: CPT

## 2021-04-20 RX ORDER — PREDNISONE 1 MG/1
5 TABLET ORAL DAILY
Qty: 90 TABLET | Refills: 1 | Status: SHIPPED | OUTPATIENT
Start: 2021-04-20 | End: 2022-01-11 | Stop reason: SDUPTHER

## 2021-04-20 RX ORDER — BACLOFEN 10 MG/1
TABLET ORAL
Qty: 180 TABLET | Refills: 1 | Status: SHIPPED | OUTPATIENT
Start: 2021-04-20 | End: 2022-01-13

## 2021-04-20 RX ORDER — TIZANIDINE 4 MG/1
TABLET ORAL
Qty: 180 TABLET | Refills: 1 | Status: SHIPPED | OUTPATIENT
Start: 2021-04-20 | End: 2022-01-12

## 2021-04-20 RX ORDER — METOCLOPRAMIDE 10 MG/1
TABLET ORAL
Qty: 360 TABLET | Refills: 1 | Status: SHIPPED | OUTPATIENT
Start: 2021-04-20 | End: 2022-01-12

## 2021-04-21 ENCOUNTER — TELEPHONE (OUTPATIENT)
Dept: FAMILY MEDICINE CLINIC | Facility: CLINIC | Age: 71
End: 2021-04-21

## 2021-04-21 NOTE — TELEPHONE ENCOUNTER
CALLED PT UNABLE TO LEAVE VM PER NO VM SET UP.  PT NEEDS AN APPT TO FU ON RESULTS FROM TESTING    HUB TO READ:    PT NEEDS TO SCHEDULE APPT TO GO OVER TEST RESULTS.

## 2021-04-21 NOTE — TELEPHONE ENCOUNTER
----- Message from MT Chapa sent at 4/20/2021  4:44 PM EDT -----  Please call pt and schedule a telephone visit to discuss testing results.  Thanks   Kimberli

## 2021-04-22 ENCOUNTER — TELEPHONE (OUTPATIENT)
Dept: FAMILY MEDICINE CLINIC | Facility: CLINIC | Age: 71
End: 2021-04-22

## 2021-04-30 ENCOUNTER — HOSPITAL ENCOUNTER (OUTPATIENT)
Dept: MAMMOGRAPHY | Facility: HOSPITAL | Age: 71
Discharge: HOME | End: 2021-04-30

## 2021-04-30 ENCOUNTER — HOSPITAL ENCOUNTER (OUTPATIENT)
Dept: ULTRASOUND IMAGING | Facility: HOSPITAL | Age: 71
Discharge: HOME | End: 2021-04-30

## 2021-04-30 DIAGNOSIS — R92.8 ABNORMALITY OF BOTH BREASTS ON SCREENING MAMMOGRAM: ICD-10-CM

## 2021-04-30 PROCEDURE — 77066 DX MAMMO INCL CAD BI: CPT

## 2021-04-30 PROCEDURE — 76642 ULTRASOUND BREAST LIMITED: CPT

## 2021-04-30 PROCEDURE — G0279 TOMOSYNTHESIS, MAMMO: HCPCS

## 2021-05-10 ENCOUNTER — OFFICE VISIT (OUTPATIENT)
Dept: FAMILY MEDICINE CLINIC | Facility: CLINIC | Age: 71
End: 2021-05-10

## 2021-05-10 DIAGNOSIS — R92.8 ABNORMAL MAMMOGRAM: Primary | ICD-10-CM

## 2021-05-10 PROBLEM — I51.89 DIASTOLIC DYSFUNCTION: Status: ACTIVE | Noted: 2021-05-10

## 2021-05-10 PROBLEM — C18.9 COLON CANCER: Status: ACTIVE | Noted: 2021-05-10

## 2021-05-10 PROBLEM — R78.81 BACTEREMIA: Status: ACTIVE | Noted: 2021-05-10

## 2021-05-10 PROBLEM — B95.62 CELLULITIS DUE TO MRSA: Status: ACTIVE | Noted: 2021-05-10

## 2021-05-10 PROBLEM — L03.90 CELLULITIS DUE TO MRSA: Status: ACTIVE | Noted: 2021-05-10

## 2021-05-10 PROBLEM — Z90.49 S/P PARTIAL COLECTOMY: Status: ACTIVE | Noted: 2021-05-10

## 2021-05-10 PROBLEM — K58.9 IBS (IRRITABLE BOWEL SYNDROME): Status: ACTIVE | Noted: 2021-05-10

## 2021-05-10 PROBLEM — I10 HYPERTENSION: Status: ACTIVE | Noted: 2021-05-10

## 2021-05-10 PROBLEM — IMO0002 HISTORY OF HYSTERECTOMY WITH OOPHORECTOMY: Status: ACTIVE | Noted: 2021-05-10

## 2021-05-10 PROBLEM — M47.816 OSTEOARTHRITIS OF LUMBAR SPINE: Status: ACTIVE | Noted: 2021-05-10

## 2021-05-10 PROBLEM — I63.9 STROKE, THROMBOTIC: Status: ACTIVE | Noted: 2021-05-10

## 2021-05-10 PROCEDURE — 99442 PR PHYS/QHP TELEPHONE EVALUATION 11-20 MIN: CPT | Performed by: NURSE PRACTITIONER

## 2021-05-10 NOTE — PROGRESS NOTES
Subjective     Alison Colvin is a 70 y.o.. female.     You have chosen to receive care through a telephone visit. Do you consent to use a telephone visit for your medical care today? yes    Pt's visit today is to go over mammogram results.      The following portions of the patient's history were reviewed and updated as appropriate: allergies, current medications, past family history, past medical history, past social history, past surgical history and problem list.    Past Medical History:   Diagnosis Date   • Abnormal nuclear stress test    • Acute sinusitis    • Allergy    • Benign essential hypertension    • Cellulitis    • Chest pain    • Colon cancer (CMS/HCC)    • COPD (chronic obstructive pulmonary disease) (CMS/HCC)    • Disease of thyroid gland    • Edema of leg    • Epilepsy (CMS/HCC)    • Esophageal reflux    • Foot pain    • Myalgia and myositis    • Onychomycosis of toenail    • Transient cerebral ischemia    • URI (upper respiratory infection)        Past Surgical History:   Procedure Laterality Date   • CATARACT EXTRACTION     • COLON SURGERY     • COLONOSCOPY     • ENDOSCOPY N/A 6/17/2016    Procedure: ESOPHAGOGASTRODUODENOSCOPY with biopsy and viral culture;  Surgeon: Presley Cornelius MD;  Location: [facility] ENDOSCOPY;  Service:    • HIP SURGERY     • HYSTERECTOMY         Review of Systems   Constitutional: Negative.    Respiratory: Negative.    Cardiovascular: Negative.        Allergies   Allergen Reactions   • Pneumococcal Vaccines Delirium     Fever, chills   • Asa [Aspirin]    • Levofloxacin    • Meperidine Other (See Comments)   • Naproxen    • Pregabalin Other (See Comments)   • Propoxyphene    • Sulfa Antibiotics Other (See Comments)       Objective     There were no vitals filed for this visit.; telephone visit  There is no height or weight on file to calculate BMI.; telephone visit    Physical Exam; telephone visit      Current Outpatient Medications:   •  allopurinol (ZYLOPRIM) 100  MG tablet, Take 1 tablet by mouth Daily., Disp: 90 tablet, Rfl: 3  •  azithromycin (Zithromax Z-Alan) 250 MG tablet, Take 2 tablets the first day, then 1 tablet daily for 4 days., Disp: 6 tablet, Rfl: 0  •  baclofen (LIORESAL) 10 MG tablet, TAKE 1 TABLET BY MOUTH  TWICE DAILY, Disp: 180 tablet, Rfl: 1  •  BROVANA 15 MCG/2ML nebulizer solution, USE 1 VIAL PER NEBULIZER BID, Disp: , Rfl: 5  •  budesonide (PULMICORT) 1 MG/2ML nebulizer solution, Pulmicort SUSP; Patient Sig: Pulmicort SUSP USE 1 UNIT DOSE VIA NEBULIZER TWICE DAILY; 0; 25-Mar-2013; Active, Disp: , Rfl:   •  calcium carbonate (OS-SEBASTIÁN) 600 MG tablet, Take 600 mg by mouth 2 (two) times a day with meals., Disp: , Rfl:   •  DULoxetine (CYMBALTA) 60 MG capsule, TAKE 1 CAPSULE BY MOUTH  DAILY, Disp: 90 capsule, Rfl: 3  •  furosemide (LASIX) 20 MG tablet, TAKE 1 TABLET BY MOUTH  DAILY, Disp: 90 tablet, Rfl: 3  •  HYDROcodone-acetaminophen (NORCO)  MG per tablet, TK 1 T PO Q 8 H PRF PAIN, Disp: , Rfl: 0  •  ipratropium-albuterol (DUO-NEB) 0.5-2.5 mg/mL nebulizer, USE 1 VIAL PER NEBULIZER QID, Disp: , Rfl: 5  •  levothyroxine (SYNTHROID, LEVOTHROID) 50 MCG tablet, TAKE 1 TABLET BY MOUTH  DAILY, Disp: 90 tablet, Rfl: 3  •  metoclopramide (REGLAN) 10 MG tablet, TAKE 1 TABLET BY MOUTH 4  TIMES DAILY, Disp: 360 tablet, Rfl: 1  •  Multiple Vitamins-Minerals (WOMENS 50+ MULTI VITAMIN/MIN PO), Take 1 tablet by mouth daily., Disp: , Rfl:   •  nystatin (MYCOSTATIN) 691028 UNIT/GM powder, Apply  topically to the appropriate area as directed Every 12 (Twelve) Hours., Disp: 15 g, Rfl: 0  •  OXYGEN-HELIUM IN, Inhale., Disp: , Rfl:   •  pantoprazole (PROTONIX) 40 MG EC tablet, TAKE 1 TABLET BY MOUTH TWO  TIMES DAILY, Disp: 180 tablet, Rfl: 3  •  potassium chloride (K-DUR,KLOR-CON) 20 MEQ CR tablet, TAKE 1 TABLET BY MOUTH  TWICE DAILY, Disp: 180 tablet, Rfl: 3  •  predniSONE (DELTASONE) 5 MG tablet, TAKE 1 TABLET BY MOUTH  DAILY, Disp: 90 tablet, Rfl: 1  •  tiZANidine  (ZANAFLEX) 4 MG tablet, TAKE 1 TABLET BY MOUTH  TWICE DAILY, Disp: 180 tablet, Rfl: 1  •  topiramate (TOPAMAX) 25 MG tablet, TAKE 1 TABLET BY MOUTH  TWICE DAILY, Disp: 180 tablet, Rfl: 3  •  warfarin (COUMADIN) 4 MG tablet, TAKE 1 TABLET BY MOUTH AS NEEDED ON MONDAY AND FRIDAYS, Disp: 93 tablet, Rfl: 3  •  warfarin (COUMADIN) 5 MG tablet, Alternate 5 mg with 2.5 mg every other day, Disp: 90 tablet, Rfl: 3      Mammo Diagnostic Digital Tomosynthesis Bilateral With CAD, US Breast Bilateral Limited  Narrative:    DATE OF EXAM:   4/30/2021 1:42 PM     PROCEDURE:   MAMMO DIAGNOSTIC DIGITAL TOMOSYNTHESIS BILATERAL W CAD-, US BREAST  BILATERAL LIMITED-     INDICATIONS:   Right breast mass, bilateral breast asymmetries.     COMPARISON:  04/19/2021.     TECHNIQUE:   Routine diagnostic views were obtained of both breasts. In addition,  bilateral breast ultrasound was performed.  Tomosynthesis was utilized for this examination.      The mammographic images were interpreted with the assistance of a  Computer Aided Detection system.     FINDINGS:   These are scattered areas of fibroglandular density.        There is a circumscribed 7 mm mass in the middle one third depth of the  right breast at the 6:00 position. There are some coarse calcifications  seen within the mass suggesting that this probably represents a  fibroadenoma. The focal asymmetry seen within the far posterior aspect  of the right breast does not persist with additional imaging. Within the  left breast, there is a questionable area of architectural distortion  located laterally. It is not well-defined on the LM view. This may  represent asymmetric breast tissue.     Ultrasound scanning of the right breast demonstrates a circumscribed  round hypoechoic mass at the 6:00 position approximately 5 cm from the  nipple measuring 7 x 6 x 8 mm in size. There is an echogenic area along  the periphery consistent with a calcification. This corresponds well to  the  mammographic mass which probably represents an old degenerating  fibroadenoma. Ultrasound scanning of the lateral aspect of the left  breast fails to demonstrate an ultrasound correlate to the questionable  architectural distortion on the diagnostic mammogram.     I would recommend a follow-up diagnostic mammogram of the left breast in  6 months to insure stability of the questionable area of architectural  distortion which probably represents normal breast tissue. I would  recommend a follow-up right breast ultrasound to confirm stability of  what is likely a benign fibroadenoma. The findings and recommendations  were discussed with the patient.     Impression:    1. The focal asymmetry in the far posterior right breast does not  persist with additional imaging.  2. There is an 8 x 7 x 6 mm round mass in the right breast at the 6:00  position which probably represents a degenerating fibroadenoma.  3. Questionable architectural distortion versus normal breast tissue in  the outer aspect of the left breast. There is no correlative abnormality  on the ultrasound.  4. I would recommend follow-up imaging in 6 months as discussed above.     ASSESSMENT:  BIRADS:  BI-RADS 3. Probably Benign.  Short interval followup  recommended.     BI-RADS category Key:  Category 0-incomplete-needs additional imaging and/or prior images for  comparison.  Category 1-negative.  Category 2-benign findings.  Category 3-probably benign-short interval follow-up suggested.  Category 4-suspicious abnormality-biopsy should be considered.  Category 5-highly suggestive for malignancy-appropriate action should be  taken.  Category 6-known biopsy-proven malignancy-appropriate action should be  taken.     NOTE: There is at least a 15% false-negative rate and mammographic  detection of cancer. Therefore, management of a palpable abnormality  must be based on clinical grounds and a negative mammography report  should not defer further breast evaluation  when clinically indicated.     The patient's information is been entered into a reminder system (MRS)  with a targeted due date for the next mammogram.     Electronically Signed By-Presley Fajardo MD On:4/30/2021 3:38 PM  This report was finalized on [id number] by  Presley Fajardo MD.    Time: 12 minutes    Assessment/Plan   Diagnoses and all orders for this visit:    1. Abnormal mammogram (Primary)        Patient Instructions   Needs repeat mammogram in 6 months (November 2021)      Return for Dr. Shipman as needed/as recommended.

## 2021-05-12 ENCOUNTER — TELEPHONE (OUTPATIENT)
Dept: FAMILY MEDICINE CLINIC | Facility: CLINIC | Age: 71
End: 2021-05-12

## 2021-05-19 ENCOUNTER — TELEPHONE (OUTPATIENT)
Dept: FAMILY MEDICINE CLINIC | Facility: CLINIC | Age: 71
End: 2021-05-19

## 2021-05-19 NOTE — TELEPHONE ENCOUNTER
Caller: CHRISTI    Relationship to patient: Other    Best call back number: [phone]    Patient is needing: AGENT REPORTING AN IRREGULAR INR.    INR =1.3

## 2021-05-26 ENCOUNTER — TELEPHONE (OUTPATIENT)
Dept: FAMILY MEDICINE CLINIC | Facility: CLINIC | Age: 71
End: 2021-05-26

## 2021-05-26 NOTE — TELEPHONE ENCOUNTER
JOHN FROM [facility] CALLING IN REGARDS TO ADVISE DR DAILEY OF THE PATIENT'S OUT OF RANGE I&R READING OF 1.6 TAKEN ON 05/25/2021.PLEASE ADVISE, THANK YOU!

## 2021-06-02 ENCOUNTER — TELEPHONE (OUTPATIENT)
Dept: FAMILY MEDICINE CLINIC | Facility: CLINIC | Age: 71
End: 2021-06-02

## 2021-06-02 NOTE — TELEPHONE ENCOUNTER
Provider: DR DAILEY    Caller: CHARMAINE RATLIFFR      Phone Number: [phone]    Reason for Call: CALLED TO REPORT AN OUT OF RANGE INR      1.6 INR TESTED ON 6/2/2021

## 2021-06-10 ENCOUNTER — TELEPHONE (OUTPATIENT)
Dept: FAMILY MEDICINE CLINIC | Facility: CLINIC | Age: 71
End: 2021-06-10

## 2021-06-17 ENCOUNTER — TELEPHONE (OUTPATIENT)
Dept: FAMILY MEDICINE CLINIC | Facility: CLINIC | Age: 71
End: 2021-06-17

## 2021-06-17 NOTE — TELEPHONE ENCOUNTER
EULOGIO  WITH   [facility]  [phone]    PT'S INR DONE ON 6-15-21 WAS 1.5    PT'S # [phone]

## 2021-06-24 ENCOUNTER — TELEPHONE (OUTPATIENT)
Dept: FAMILY MEDICINE CLINIC | Facility: CLINIC | Age: 71
End: 2021-06-24

## 2021-06-24 ENCOUNTER — DOCUMENTATION (OUTPATIENT)
Dept: FAMILY MEDICINE CLINIC | Facility: CLINIC | Age: 71
End: 2021-06-24

## 2021-06-24 NOTE — TELEPHONE ENCOUNTER
MD/[facility] SENT PT'S PROTIME OVER TODAY, IT WAS 1.3. PT WAS TAKING 2MG ON Monday, Wednesday, AND Friday AND 4MG THE OTHER FOUR DAYS. DR. DAILEY WANTS HER TO CHANGE IT TO 2MG ON Monday AND Friday AND 4MG THE OTHER 5 DAYS. I CALLED AND S/W PT AND MADE HER AWARE OF THIS.

## 2021-07-01 ENCOUNTER — TELEPHONE (OUTPATIENT)
Dept: FAMILY MEDICINE CLINIC | Facility: CLINIC | Age: 71
End: 2021-07-01

## 2021-07-14 ENCOUNTER — TELEPHONE (OUTPATIENT)
Dept: FAMILY MEDICINE CLINIC | Facility: CLINIC | Age: 71
End: 2021-07-14

## 2021-07-14 DIAGNOSIS — M19.041 OSTEOARTHRITIS OF BOTH HANDS, UNSPECIFIED OSTEOARTHRITIS TYPE: ICD-10-CM

## 2021-07-14 DIAGNOSIS — Z11.59 NEED FOR HEPATITIS C SCREENING TEST: Primary | ICD-10-CM

## 2021-07-14 DIAGNOSIS — E78.5 HYPERLIPIDEMIA, UNSPECIFIED HYPERLIPIDEMIA TYPE: ICD-10-CM

## 2021-07-14 DIAGNOSIS — E03.9 ACQUIRED HYPOTHYROIDISM: ICD-10-CM

## 2021-07-14 DIAGNOSIS — E55.9 VITAMIN D DEFICIENCY, UNSPECIFIED: ICD-10-CM

## 2021-07-14 DIAGNOSIS — M19.042 OSTEOARTHRITIS OF BOTH HANDS, UNSPECIFIED OSTEOARTHRITIS TYPE: ICD-10-CM

## 2021-07-14 NOTE — TELEPHONE ENCOUNTER
Caller: SEGUN    Relationship:     Best call back number:     What is the best time to reach you:     Who are you requesting to speak with (clinical staff, provider,  specific staff member):     Do you know the name of the person who called:     What was the call regarding: SEGUN IS CALLING IN TO INFORM DR DAILEY THAT PATIENTS INR FROM LAST NIGHT WAS 2.1    Do you require a callback: NO

## 2021-07-28 ENCOUNTER — OFFICE VISIT (OUTPATIENT)
Dept: FAMILY MEDICINE CLINIC | Facility: CLINIC | Age: 71
End: 2021-07-28

## 2021-07-28 VITALS
HEIGHT: 62 IN | RESPIRATION RATE: 18 BRPM | HEART RATE: 80 BPM | TEMPERATURE: 97.5 F | BODY MASS INDEX: 42.06 KG/M2 | DIASTOLIC BLOOD PRESSURE: 76 MMHG | SYSTOLIC BLOOD PRESSURE: 120 MMHG | OXYGEN SATURATION: 97 %

## 2021-07-28 DIAGNOSIS — N18.30 CHRONIC RENAL IMPAIRMENT, STAGE 3 (MODERATE), UNSPECIFIED WHETHER STAGE 3A OR 3B CKD (HCC): ICD-10-CM

## 2021-07-28 DIAGNOSIS — K92.2 UPPER GASTROINTESTINAL HEMORRHAGE: ICD-10-CM

## 2021-07-28 DIAGNOSIS — I48.0 PAF (PAROXYSMAL ATRIAL FIBRILLATION) (HCC): ICD-10-CM

## 2021-07-28 DIAGNOSIS — M47.816 SPONDYLOSIS OF LUMBAR REGION WITHOUT MYELOPATHY OR RADICULOPATHY: ICD-10-CM

## 2021-07-28 DIAGNOSIS — D64.9 ANEMIA, UNSPECIFIED TYPE: ICD-10-CM

## 2021-07-28 DIAGNOSIS — E03.9 ACQUIRED HYPOTHYROIDISM: ICD-10-CM

## 2021-07-28 DIAGNOSIS — I10 BENIGN ESSENTIAL HYPERTENSION: Primary | ICD-10-CM

## 2021-07-28 DIAGNOSIS — K21.9 GASTROESOPHAGEAL REFLUX DISEASE, UNSPECIFIED WHETHER ESOPHAGITIS PRESENT: ICD-10-CM

## 2021-07-28 DIAGNOSIS — R22.9 LOCALIZED SKIN MASS, LUMP, OR SWELLING: ICD-10-CM

## 2021-07-28 PROCEDURE — 99214 OFFICE O/P EST MOD 30 MIN: CPT | Performed by: INTERNAL MEDICINE

## 2021-07-28 NOTE — PROGRESS NOTES
Subjective   Alison Colvin is a 70 y.o. female. Patient is here today for follow-up on her hypertension, hyperlipidemia, history of paroxysmal atrial fibrillation, DVT and PE in the past, hypothyroidism and GERD and chronic kidney disease stage III.  Patient's generally stable.  She does have 2 skin lesions on her Left posterior lower leg that have some slight pigmentation.  They have not really been sore or inflamed and have been there for a good while, uncertain whether they have changed recently or not as they are not easy for the patient to visualize.  Chief Complaint   Patient presents with   • Hypertension     AFIB, HYPOTHYROIDISM- F/U LABS          Vitals:    07/28/21 1350   BP: 120/76   Pulse: 80   Resp: 18   Temp: 97.5 °F (36.4 °C)   SpO2: 97%     Body mass index is 42.06 kg/m².  The following portions of the patient's history were reviewed and updated as appropriate: allergies, current medications, past family history, past medical history, past social history, past surgical history and problem list.    Past Medical History:   Diagnosis Date   • Abnormal nuclear stress test    • Acute sinusitis    • Allergy    • Benign essential hypertension    • Cellulitis    • Chest pain    • Colon cancer (CMS/HCC)    • COPD (chronic obstructive pulmonary disease) (CMS/HCC)    • Disease of thyroid gland    • Edema of leg    • Epilepsy (CMS/HCC)    • Esophageal reflux    • Foot pain    • Myalgia and myositis    • Onychomycosis of toenail    • Transient cerebral ischemia    • URI (upper respiratory infection)       Allergies   Allergen Reactions   • Pneumococcal Vaccines Delirium     Fever, chills   • Asa [Aspirin]    • Levofloxacin    • Meperidine Other (See Comments)   • Naproxen    • Pregabalin Other (See Comments)   • Propoxyphene    • Sulfa Antibiotics Other (See Comments)      Social History     Socioeconomic History   • Marital status:      Spouse name: Not on file   • Number of children: Not on file   •  Years of education: Not on file   • Highest education level: Not on file   Tobacco Use   • Smoking status: Never Smoker   • Smokeless tobacco: Never Used   Substance and Sexual Activity   • Alcohol use: No   • Drug use: No        Current Outpatient Medications:   •  allopurinol (ZYLOPRIM) 100 MG tablet, Take 1 tablet by mouth Daily., Disp: 90 tablet, Rfl: 3  •  baclofen (LIORESAL) 10 MG tablet, TAKE 1 TABLET BY MOUTH  TWICE DAILY, Disp: 180 tablet, Rfl: 1  •  BROVANA 15 MCG/2ML nebulizer solution, USE 1 VIAL PER NEBULIZER BID, Disp: , Rfl: 5  •  budesonide (PULMICORT) 1 MG/2ML nebulizer solution, Pulmicort SUSP; Patient Sig: Pulmicort SUSP USE 1 UNIT DOSE VIA NEBULIZER TWICE DAILY; 0; 25-Mar-2013; Active, Disp: , Rfl:   •  calcium carbonate (OS-SEBASTIÁN) 600 MG tablet, Take 600 mg by mouth 2 (two) times a day with meals., Disp: , Rfl:   •  DULoxetine (CYMBALTA) 60 MG capsule, TAKE 1 CAPSULE BY MOUTH  DAILY, Disp: 90 capsule, Rfl: 3  •  furosemide (LASIX) 20 MG tablet, TAKE 1 TABLET BY MOUTH  DAILY, Disp: 90 tablet, Rfl: 3  •  HYDROcodone-acetaminophen (NORCO)  MG per tablet, TK 1 T PO Q 8 H PRF PAIN, Disp: , Rfl: 0  •  ipratropium-albuterol (DUO-NEB) 0.5-2.5 mg/mL nebulizer, USE 1 VIAL PER NEBULIZER QID, Disp: , Rfl: 5  •  levothyroxine (SYNTHROID, LEVOTHROID) 50 MCG tablet, TAKE 1 TABLET BY MOUTH  DAILY, Disp: 90 tablet, Rfl: 3  •  metoclopramide (REGLAN) 10 MG tablet, TAKE 1 TABLET BY MOUTH 4  TIMES DAILY, Disp: 360 tablet, Rfl: 1  •  Multiple Vitamins-Minerals (WOMENS 50+ MULTI VITAMIN/MIN PO), Take 1 tablet by mouth daily., Disp: , Rfl:   •  nystatin (MYCOSTATIN) 100908 UNIT/GM powder, Apply  topically to the appropriate area as directed Every 12 (Twelve) Hours., Disp: 15 g, Rfl: 0  •  OXYGEN-HELIUM IN, Inhale., Disp: , Rfl:   •  pantoprazole (PROTONIX) 40 MG EC tablet, TAKE 1 TABLET BY MOUTH TWO  TIMES DAILY, Disp: 180 tablet, Rfl: 3  •  potassium chloride (K-DUR,KLOR-CON) 20 MEQ CR tablet, TAKE 1 TABLET BY  MOUTH  TWICE DAILY, Disp: 180 tablet, Rfl: 3  •  predniSONE (DELTASONE) 5 MG tablet, TAKE 1 TABLET BY MOUTH  DAILY, Disp: 90 tablet, Rfl: 1  •  tiZANidine (ZANAFLEX) 4 MG tablet, TAKE 1 TABLET BY MOUTH  TWICE DAILY, Disp: 180 tablet, Rfl: 1  •  topiramate (TOPAMAX) 25 MG tablet, TAKE 1 TABLET BY MOUTH  TWICE DAILY, Disp: 180 tablet, Rfl: 3  •  warfarin (COUMADIN) 4 MG tablet, TAKE 1 TABLET BY MOUTH AS NEEDED ON MONDAY AND FRIDAYS, Disp: 93 tablet, Rfl: 3  •  warfarin (COUMADIN) 5 MG tablet, Alternate 5 mg with 2.5 mg every other day, Disp: 90 tablet, Rfl: 3     Objective     History of Present Illness     Review of Systems   Constitutional: Negative.    HENT: Negative.    Respiratory: Negative.    Cardiovascular: Negative.    Gastrointestinal: Negative.    Genitourinary: Negative.    Musculoskeletal: Positive for back pain.   Skin: Negative.         2 lesions on the left calf   Neurological: Negative.    Hematological: Negative.    Psychiatric/Behavioral: Negative.        Physical Exam  Vitals and nursing note reviewed.   Constitutional:       General: She is not in acute distress.     Appearance: Normal appearance. She is obese. She is not ill-appearing.   HENT:      Head: Normocephalic and atraumatic.   Eyes:      General: No scleral icterus.     Conjunctiva/sclera: Conjunctivae normal.   Cardiovascular:      Rate and Rhythm: Normal rate and regular rhythm.      Heart sounds: Normal heart sounds.   Pulmonary:      Effort: Pulmonary effort is normal. No respiratory distress.      Breath sounds: Normal breath sounds. No wheezing or rales.   Musculoskeletal:         General: Normal range of motion.      Cervical back: Normal range of motion and neck supple.      Right lower leg: No edema.      Left lower leg: No edema.   Skin:     General: Skin is warm.      Comments: There are 2 different 2 to 3 mm lesions on the left calf that have some scattered pigmentation.  To me they appear benign   Neurological:      General:  No focal deficit present.      Mental Status: She is alert and oriented to person, place, and time.   Psychiatric:         Mood and Affect: Mood normal.         Behavior: Behavior normal.         ASSESSMENT CBC had a minimally low hemoglobin of 11 but was otherwise normal.  CMP had a stable creatinine of 1.52 and is otherwise normal.  Lipid panel has total cholesterol 232, HDL 71,  that is relatively stable.  TSH and free T4 were both normal.  Vitamin D level was just minimally low at 29.3.  Hepatitis C screen is negative.  #1-hypertension, controlled  #2-paroxysmal atrial fibrillation, regular rhythm today  #3-history of DVT and PE, asymptomatic  #4-hypothyroidism, euthyroid on replacement  #5-GERD, stable on medication  #6-chronic kidney disease stage III, stable  #7-anemia, probably secondary to kidney disease, improved and stable  #8-2 pigmented skin lesions on the left calf     Problems Addressed this Visit        Cardiac and Vasculature    Benign essential hypertension - Primary    PAF (paroxysmal atrial fibrillation) (CMS/[facility])       Endocrine and Metabolic    Hypothyroidism       Gastrointestinal Abdominal     Gastroesophageal reflux disease    Upper gastrointestinal hemorrhage       Genitourinary and Reproductive     Chronic renal impairment, stage 3 (moderate) (CMS/[facility])       Hematology and Neoplasia    Anemia       Neuro    Osteoarthritis of lumbar spine      Other Visit Diagnoses     Localized skin mass, lump, or swelling        Relevant Orders    Ambulatory Referral to Dermatology      Diagnoses       Codes Comments    Benign essential hypertension    -  Primary ICD-10-CM: I10  ICD-9-CM: 401.1     PAF (paroxysmal atrial fibrillation) (CMS/[facility])     ICD-10-CM: I48.0  ICD-9-CM: 427.31     Acquired hypothyroidism     ICD-10-CM: E03.9  ICD-9-CM: 244.9     Gastroesophageal reflux disease, unspecified whether esophagitis present     ICD-10-CM: K21.9  ICD-9-CM: 530.81     Upper gastrointestinal hemorrhage      ICD-10-CM: K92.2  ICD-9-CM: 578.9     Chronic renal impairment, stage 3 (moderate), unspecified whether stage 3a or 3b CKD (CMS/HCC)     ICD-10-CM: N18.30  ICD-9-CM: 585.3     Anemia, unspecified type     ICD-10-CM: D64.9  ICD-9-CM: 285.9     Spondylosis of lumbar region without myelopathy or radiculopathy     ICD-10-CM: M47.816  ICD-9-CM: 721.3     Localized skin mass, lump, or swelling     ICD-10-CM: R22.9  ICD-9-CM: 782.2           PLAN I am referring the patient to dermatology, Dr. Bruce Pritchett for evaluation of her skin lesions and a general check.  I recommended a flu shot in October.  The patient will continue current medicines as now and I plan on rechecking her in 6 months with a CBC, CMP, lipid panel, TSH and free T4 and vitamin D level    There are no Patient Instructions on file for this visit.  Return in about 6 months (around 1/28/2022) for with labs.

## 2021-09-15 ENCOUNTER — TELEPHONE (OUTPATIENT)
Dept: FAMILY MEDICINE CLINIC | Facility: CLINIC | Age: 71
End: 2021-09-15

## 2021-10-06 ENCOUNTER — TELEPHONE (OUTPATIENT)
Dept: FAMILY MEDICINE CLINIC | Facility: CLINIC | Age: 71
End: 2021-10-06

## 2021-10-13 ENCOUNTER — TELEPHONE (OUTPATIENT)
Dept: FAMILY MEDICINE CLINIC | Facility: CLINIC | Age: 71
End: 2021-10-13

## 2021-10-20 ENCOUNTER — TELEPHONE (OUTPATIENT)
Dept: FAMILY MEDICINE CLINIC | Facility: CLINIC | Age: 71
End: 2021-10-20

## 2021-10-27 ENCOUNTER — TELEPHONE (OUTPATIENT)
Dept: FAMILY MEDICINE CLINIC | Facility: CLINIC | Age: 71
End: 2021-10-27

## 2021-10-27 NOTE — TELEPHONE ENCOUNTER
PT CALLED IN STATING PER LAST MAMMO AND US ON 4/2021 SHE WAS ADVISED TO HAVE BOTH RE-DONE IN 6 MONTHS.  PT NEEDS ORDER/REF. ENTERED    IF YOU HAVE ANY QUESTIONS PLEASE CONTACT PT [phone]

## 2021-11-02 DIAGNOSIS — R92.8 ABNORMAL MAMMOGRAM: Primary | ICD-10-CM

## 2021-11-03 ENCOUNTER — TELEPHONE (OUTPATIENT)
Dept: FAMILY MEDICINE CLINIC | Facility: CLINIC | Age: 71
End: 2021-11-03

## 2021-11-05 ENCOUNTER — TELEPHONE (OUTPATIENT)
Dept: FAMILY MEDICINE CLINIC | Facility: CLINIC | Age: 71
End: 2021-11-05

## 2021-11-05 DIAGNOSIS — R92.8 ABNORMAL MAMMOGRAM: Primary | ICD-10-CM

## 2021-11-05 NOTE — TELEPHONE ENCOUNTER
ORDER HAS BEEN PLACED.   
PLEASE PUT CORRECT ORDER IN PER REQUEST FROM SCHEDULE ONE.    ORDER NEEDS TO BE A     US BREAST RIGHT LIMITED    DX: R92.8   ABNORMAL MAMMOGRAM    THANKS  
- - -

## 2021-11-10 ENCOUNTER — TELEPHONE (OUTPATIENT)
Dept: FAMILY MEDICINE CLINIC | Facility: CLINIC | Age: 71
End: 2021-11-10

## 2021-11-10 NOTE — TELEPHONE ENCOUNTER
.    Caller: ROBERT    Relationship: CHRISTI    Best call back number: [phone]    What test was performed: INR    When was the test performed: 11/09    Additional notes: INR WAS 3.6, FAXED OVER RESULTS

## 2021-11-15 ENCOUNTER — TELEPHONE (OUTPATIENT)
Dept: FAMILY MEDICINE CLINIC | Facility: CLINIC | Age: 71
End: 2021-11-15

## 2021-11-15 NOTE — TELEPHONE ENCOUNTER
Caller: Alison Colvin    Relationship: Self    Best call back number: [phone]    What medication are you requesting: ANTIBIOTIC     What are your current symptoms: COUGH, CONGESTION AND MUCUS     How long have you been experiencing symptoms: 2 DAYS     Have you had these symptoms before:    [x] Yes  [] No    Have you been treated for these symptoms before:   [x] Yes  [] No    If a prescription is needed, what is your preferred pharmacy and phone number:   [id number] [place] [address] [facility] [address] - [phone]  - [phone] FX         Additional notes: PT STATED THAT SHE WOULD NOT LIKE TO COME INTO OFFICE DUE TO LIVING FAR AND IT BEING COLD BUT THAT MD DAILEY USUALLY CALLS SOMETHING IN FOR HER

## 2021-11-16 NOTE — TELEPHONE ENCOUNTER
CALLED AND S/W PT AND ADVISED SHE WOULD NEED AN APPT TO BE SEEN BY DR. DAILEY OR SANTOS/DR. COE. PT DID NOT WANT TO MAKE APPT AT THIS TIME.

## 2021-11-19 ENCOUNTER — TELEPHONE (OUTPATIENT)
Dept: FAMILY MEDICINE CLINIC | Facility: CLINIC | Age: 71
End: 2021-11-19

## 2021-11-19 NOTE — TELEPHONE ENCOUNTER
Caller: Alison Colvin    Relationship: Self    Best call back number: [phone]     What is the best time to reach you: ANY     Who are you requesting to speak with (clinical staff, provider,  specific staff member): CLINICAL STAFF     Do you know the name of the person who called: PATIENT     What was the call regarding: PATIENT CALLED IN AND STATED SHE BELIEVES SHE IS HAVING A GOUT FLARE UP AND WANTS TO KNOW IF SHE NEEDS TO INCREASE HER DOSAGE OF allopurinol (ZYLOPRIM) 100 MG tablet.     Do you require a callback: YES

## 2021-11-19 NOTE — TELEPHONE ENCOUNTER
[facility]UB [facility]: CALLED AND LEFT MESSAGE FOR PT THAT SHE NEEDS A VISIT TO BE SEEN. EITHER A TELEPHONE VISIT OR IN OFFICE WITH ONE OF THE PROVIDERS.

## 2021-11-24 ENCOUNTER — TELEPHONE (OUTPATIENT)
Dept: FAMILY MEDICINE CLINIC | Facility: CLINIC | Age: 71
End: 2021-11-24

## 2021-11-24 NOTE — TELEPHONE ENCOUNTER
Hub staff attempted to follow warm transfer process and was unsuccessful     Caller: ALBANIA SALMON [facility]    Relationship to patient: [facility]    Best call back number: [phone]    Patient is needing: ALBANIA WITH [facility] CALLED WITH AN OUT OF RANGE INR FOR THE PATIENT, HUB WARM TRANSFER WAS UNSUCCESSFUL-     PATIENT'S INR WAS 2.0 TODAY- STATED IF THERE WAS ANY INSTRUCTIONS OR MEDICATION CHANGES TO DIRECT THEM TO THE PATIENT ASAP

## 2021-11-29 RX ORDER — LEVOTHYROXINE SODIUM 0.05 MG/1
50 TABLET ORAL DAILY
Qty: 90 TABLET | Refills: 3 | Status: SHIPPED | OUTPATIENT
Start: 2021-11-29 | End: 2022-09-08

## 2021-11-29 RX ORDER — FUROSEMIDE 20 MG/1
20 TABLET ORAL DAILY
Qty: 90 TABLET | Refills: 3 | Status: SHIPPED | OUTPATIENT
Start: 2021-11-29 | End: 2022-09-08

## 2021-11-29 RX ORDER — POTASSIUM CHLORIDE 20 MEQ/1
TABLET, EXTENDED RELEASE ORAL
Qty: 180 TABLET | Refills: 3 | Status: SHIPPED | OUTPATIENT
Start: 2021-11-29 | End: 2022-09-08

## 2021-11-29 RX ORDER — PANTOPRAZOLE SODIUM 40 MG/1
TABLET, DELAYED RELEASE ORAL
Qty: 180 TABLET | Refills: 3 | Status: SHIPPED | OUTPATIENT
Start: 2021-11-29 | End: 2022-10-03

## 2021-11-29 RX ORDER — DULOXETIN HYDROCHLORIDE 60 MG/1
60 CAPSULE, DELAYED RELEASE ORAL DAILY
Qty: 90 CAPSULE | Refills: 3 | Status: SHIPPED | OUTPATIENT
Start: 2021-11-29 | End: 2022-09-08

## 2021-11-29 RX ORDER — TOPIRAMATE 25 MG/1
TABLET ORAL
Qty: 180 TABLET | Refills: 3 | Status: SHIPPED | OUTPATIENT
Start: 2021-11-29 | End: 2022-07-28 | Stop reason: SDUPTHER

## 2021-11-30 ENCOUNTER — HOSPITAL ENCOUNTER (OUTPATIENT)
Dept: ULTRASOUND IMAGING | Facility: HOSPITAL | Age: 71
Discharge: HOME | End: 2021-11-30

## 2021-11-30 ENCOUNTER — HOSPITAL ENCOUNTER (OUTPATIENT)
Dept: MAMMOGRAPHY | Facility: HOSPITAL | Age: 71
Discharge: HOME | End: 2021-11-30

## 2021-11-30 DIAGNOSIS — R92.8 ABNORMAL MAMMOGRAM: ICD-10-CM

## 2021-11-30 PROCEDURE — 76642 ULTRASOUND BREAST LIMITED: CPT

## 2021-11-30 PROCEDURE — G0279 TOMOSYNTHESIS, MAMMO: HCPCS

## 2021-11-30 PROCEDURE — 77065 DX MAMMO INCL CAD UNI: CPT

## 2021-12-01 ENCOUNTER — TELEPHONE (OUTPATIENT)
Dept: FAMILY MEDICINE CLINIC | Facility: CLINIC | Age: 71
End: 2021-12-01

## 2021-12-01 NOTE — TELEPHONE ENCOUNTER
ANNELIESE WITH [facility] CALLED WANTING TO KNOW WHEN PT CAN GO OFF HER WARFARIN. PT WILL BE HAVING A BREAT PROCEDURE DONE AND NEEDS TO STOP HER WARFARIN PRIOR TO THE PROCEDURE.    PLEASE CALL ANNELIESE AND ADVISE    ANNELIESE   [facility]  [phone]

## 2021-12-08 ENCOUNTER — TELEPHONE (OUTPATIENT)
Dept: FAMILY MEDICINE CLINIC | Facility: CLINIC | Age: 71
End: 2021-12-08

## 2021-12-08 NOTE — TELEPHONE ENCOUNTER
CRISTIANA FROM [facility] CALLED TO REPORT OUT OF RANGE FOR PATIENT      2.1 TODAY     CRISTIANA FROM [facility] [phone]

## 2021-12-13 ENCOUNTER — HOSPITAL ENCOUNTER (OUTPATIENT)
Dept: MAMMOGRAPHY | Facility: HOSPITAL | Age: 71
Discharge: HOME | End: 2021-12-13

## 2021-12-13 ENCOUNTER — LAB (OUTPATIENT)
Dept: LAB | Facility: HOSPITAL | Age: 71
End: 2021-12-13

## 2021-12-13 ENCOUNTER — HOSPITAL ENCOUNTER (OUTPATIENT)
Dept: ULTRASOUND IMAGING | Facility: HOSPITAL | Age: 71
Discharge: HOME | End: 2021-12-13

## 2021-12-13 DIAGNOSIS — R92.8 ABNORMAL MAMMOGRAM OF LEFT BREAST: ICD-10-CM

## 2021-12-13 DIAGNOSIS — Z79.01 ANTICOAGULANT LONG-TERM USE: ICD-10-CM

## 2021-12-13 DIAGNOSIS — R92.8 ABNORMAL MAMMOGRAM OF LEFT BREAST: Primary | ICD-10-CM

## 2021-12-13 LAB
INR PPP: 2.27 (ref 2–3)
PROTHROMBIN TIME: 23.7 SECONDS (ref 19.4–28.5)

## 2021-12-13 PROCEDURE — 36415 COLL VENOUS BLD VENIPUNCTURE: CPT

## 2021-12-13 PROCEDURE — G0279 TOMOSYNTHESIS, MAMMO: HCPCS

## 2021-12-13 PROCEDURE — 85610 PROTHROMBIN TIME: CPT

## 2021-12-13 PROCEDURE — 77065 DX MAMMO INCL CAD UNI: CPT

## 2021-12-22 ENCOUNTER — TELEPHONE (OUTPATIENT)
Dept: FAMILY MEDICINE CLINIC | Facility: CLINIC | Age: 71
End: 2021-12-22

## 2021-12-22 NOTE — TELEPHONE ENCOUNTER
Pt called she is checking inr every tues. She takes 4mg 5days a wk and none the other 2days she just had her 3rd epidural.

## 2022-01-06 ENCOUNTER — TELEPHONE (OUTPATIENT)
Dept: FAMILY MEDICINE CLINIC | Facility: CLINIC | Age: 72
End: 2022-01-06

## 2022-01-11 NOTE — TELEPHONE ENCOUNTER
Caller: Alison Colvin    Relationship: Self    Requested Prescriptions:   Requested Prescriptions     Pending Prescriptions Disp Refills   • metoclopramide (REGLAN) 10 MG tablet [Pharmacy Med Name: METOCLOPRAMIDE  10MG  TAB] 360 tablet 1     Sig: TAKE 1 TABLET BY MOUTH 4  TIMES DAILY   • tiZANidine (ZANAFLEX) 4 MG tablet [Pharmacy Med Name: tiZANidine HCl 4 MG Oral Tablet] 180 tablet 1     Sig: TAKE 1 TABLET BY MOUTH  TWICE DAILY   • warfarin (COUMADIN) 4 MG tablet 93 tablet 3   • predniSONE (DELTASONE) 5 MG tablet 90 tablet 1     Sig: Take 1 tablet by mouth Daily.   • allopurinol (ZYLOPRIM) 100 MG tablet 90 tablet 3     Sig: Take 1 tablet by mouth Daily.        Pharmacy where request should be sent: [facility] - [facility] [address] [facility], [address] - [phone]  - [phone] FX

## 2022-01-12 ENCOUNTER — TELEPHONE (OUTPATIENT)
Dept: FAMILY MEDICINE CLINIC | Facility: CLINIC | Age: 72
End: 2022-01-12

## 2022-01-12 LAB — INR PPP: 2.3

## 2022-01-12 RX ORDER — PREDNISONE 1 MG/1
5 TABLET ORAL DAILY
Qty: 90 TABLET | Refills: 1 | Status: SHIPPED | OUTPATIENT
Start: 2022-01-12 | End: 2022-07-28 | Stop reason: SDUPTHER

## 2022-01-12 RX ORDER — WARFARIN SODIUM 4 MG/1
4 TABLET ORAL NIGHTLY
Qty: 93 TABLET | Refills: 3 | Status: SHIPPED | OUTPATIENT
Start: 2022-01-12 | End: 2022-01-17 | Stop reason: SDUPTHER

## 2022-01-12 RX ORDER — TIZANIDINE 4 MG/1
TABLET ORAL
Qty: 180 TABLET | Refills: 1 | Status: SHIPPED | OUTPATIENT
Start: 2022-01-12 | End: 2022-01-13 | Stop reason: CLARIF

## 2022-01-12 RX ORDER — ALLOPURINOL 100 MG/1
100 TABLET ORAL DAILY
Qty: 90 TABLET | Refills: 3 | Status: SHIPPED | OUTPATIENT
Start: 2022-01-12 | End: 2022-01-17 | Stop reason: SDUPTHER

## 2022-01-12 RX ORDER — METOCLOPRAMIDE 10 MG/1
TABLET ORAL
Qty: 360 TABLET | Refills: 1 | Status: SHIPPED | OUTPATIENT
Start: 2022-01-12 | End: 2022-08-17 | Stop reason: SDUPTHER

## 2022-01-13 ENCOUNTER — DOCUMENTATION (OUTPATIENT)
Dept: FAMILY MEDICINE CLINIC | Facility: CLINIC | Age: 72
End: 2022-01-13

## 2022-01-13 RX ORDER — BACLOFEN 10 MG/1
TABLET ORAL
Qty: 180 TABLET | Refills: 1 | Status: SHIPPED | OUTPATIENT
Start: 2022-01-13 | End: 2022-09-08

## 2022-01-17 DIAGNOSIS — I48.0 PAF (PAROXYSMAL ATRIAL FIBRILLATION): ICD-10-CM

## 2022-01-17 DIAGNOSIS — M1A.9XX1 GOUT WITH TOPHUS: Primary | ICD-10-CM

## 2022-01-17 DIAGNOSIS — I87.2 CHRONIC VENOUS INSUFFICIENCY: ICD-10-CM

## 2022-01-17 RX ORDER — WARFARIN SODIUM 5 MG/1
TABLET ORAL
Qty: 90 TABLET | Refills: 3 | Status: SHIPPED | OUTPATIENT
Start: 2022-01-17 | End: 2022-03-16

## 2022-01-17 RX ORDER — WARFARIN SODIUM 4 MG/1
4 TABLET ORAL NIGHTLY
Qty: 90 TABLET | Refills: 3 | Status: SHIPPED | OUTPATIENT
Start: 2022-01-17 | End: 2022-04-11 | Stop reason: SDUPTHER

## 2022-01-17 RX ORDER — ALLOPURINOL 100 MG/1
100 TABLET ORAL DAILY
Qty: 90 TABLET | Refills: 3 | Status: SHIPPED | OUTPATIENT
Start: 2022-01-17 | End: 2022-09-08

## 2022-01-28 DIAGNOSIS — E03.9 ACQUIRED HYPOTHYROIDISM: ICD-10-CM

## 2022-01-28 DIAGNOSIS — E55.9 VITAMIN D DEFICIENCY, UNSPECIFIED: ICD-10-CM

## 2022-01-28 DIAGNOSIS — E78.5 HYPERLIPIDEMIA, UNSPECIFIED HYPERLIPIDEMIA TYPE: ICD-10-CM

## 2022-02-02 LAB
25(OH)D3+25(OH)D2 SERPL-MCNC: 31.9 NG/ML (ref 30–100)
ALBUMIN SERPL-MCNC: 3.9 G/DL (ref 3.7–4.7)
ALBUMIN/GLOB SERPL: 1.6 {RATIO} (ref 1.2–2.2)
ALP SERPL-CCNC: 74 IU/L (ref 44–121)
ALT SERPL-CCNC: 21 IU/L (ref 0–32)
AST SERPL-CCNC: 19 IU/L (ref 0–40)
BASOPHILS # BLD AUTO: 0.1 X10E3/UL (ref 0–0.2)
BASOPHILS NFR BLD AUTO: 2 %
BILIRUB SERPL-MCNC: 0.3 MG/DL (ref 0–1.2)
BUN SERPL-MCNC: 18 MG/DL (ref 8–27)
BUN/CREAT SERPL: 11 (ref 12–28)
CALCIUM SERPL-MCNC: 9.8 MG/DL (ref 8.7–10.3)
CHLORIDE SERPL-SCNC: 100 MMOL/L (ref 96–106)
CHOLEST SERPL-MCNC: 253 MG/DL (ref 100–199)
CO2 SERPL-SCNC: 25 MMOL/L (ref 20–29)
CREAT SERPL-MCNC: 1.59 MG/DL (ref 0.57–1)
EOSINOPHIL # BLD AUTO: 0.2 X10E3/UL (ref 0–0.4)
EOSINOPHIL NFR BLD AUTO: 3 %
ERYTHROCYTE [DISTWIDTH] IN BLOOD BY AUTOMATED COUNT: 18.1 % (ref 11.7–15.4)
GLOBULIN SER CALC-MCNC: 2.5 G/DL (ref 1.5–4.5)
GLUCOSE SERPL-MCNC: 97 MG/DL (ref 65–99)
HCT VFR BLD AUTO: 37.5 % (ref 34–46.6)
HDLC SERPL-MCNC: 76 MG/DL
HGB BLD-MCNC: 12.1 G/DL (ref 11.1–15.9)
IMM GRANULOCYTES # BLD AUTO: 0.3 X10E3/UL (ref 0–0.1)
IMM GRANULOCYTES NFR BLD AUTO: 4 %
LDLC SERPL CALC-MCNC: 140 MG/DL (ref 0–99)
LDLC/HDLC SERPL: 1.8 RATIO (ref 0–3.2)
LYMPHOCYTES # BLD AUTO: 2.3 X10E3/UL (ref 0.7–3.1)
LYMPHOCYTES NFR BLD AUTO: 28 %
MCH RBC QN AUTO: 27.8 PG (ref 26.6–33)
MCHC RBC AUTO-ENTMCNC: 32.3 G/DL (ref 31.5–35.7)
MCV RBC AUTO: 86 FL (ref 79–97)
MONOCYTES # BLD AUTO: 0.8 X10E3/UL (ref 0.1–0.9)
MONOCYTES NFR BLD AUTO: 9 %
NEUTROPHILS # BLD AUTO: 4.4 X10E3/UL (ref 1.4–7)
NEUTROPHILS NFR BLD AUTO: 54 %
PLATELET # BLD AUTO: 177 X10E3/UL (ref 150–450)
POTASSIUM SERPL-SCNC: 4.3 MMOL/L (ref 3.5–5.2)
PROT SERPL-MCNC: 6.4 G/DL (ref 6–8.5)
RBC # BLD AUTO: 4.36 X10E6/UL (ref 3.77–5.28)
SODIUM SERPL-SCNC: 140 MMOL/L (ref 134–144)
T4 FREE SERPL-MCNC: 1.29 NG/DL (ref 0.82–1.77)
TRIGL SERPL-MCNC: 209 MG/DL (ref 0–149)
TSH SERPL DL<=0.005 MIU/L-ACNC: 2.61 UIU/ML (ref 0.45–4.5)
VLDLC SERPL CALC-MCNC: 37 MG/DL (ref 5–40)
WBC # BLD AUTO: 8.1 X10E3/UL (ref 3.4–10.8)

## 2022-02-04 ENCOUNTER — TELEPHONE (OUTPATIENT)
Dept: FAMILY MEDICINE CLINIC | Facility: CLINIC | Age: 72
End: 2022-02-04

## 2022-02-04 NOTE — TELEPHONE ENCOUNTER
Caller: RONALDO    Relationship to patient: Other    Best call back number:[phone]    Patient is needing: RONALDO WITH [facility] CALLED IN TO REPORT PATIENTS INR. 2.2

## 2022-02-10 ENCOUNTER — OFFICE VISIT (OUTPATIENT)
Dept: FAMILY MEDICINE CLINIC | Facility: CLINIC | Age: 72
End: 2022-02-10

## 2022-02-10 VITALS
OXYGEN SATURATION: 97 % | SYSTOLIC BLOOD PRESSURE: 121 MMHG | DIASTOLIC BLOOD PRESSURE: 70 MMHG | HEART RATE: 49 BPM | BODY MASS INDEX: 42.06 KG/M2 | TEMPERATURE: 96.6 F | HEIGHT: 62 IN

## 2022-02-10 DIAGNOSIS — I48.0 PAF (PAROXYSMAL ATRIAL FIBRILLATION): ICD-10-CM

## 2022-02-10 DIAGNOSIS — N18.30 CHRONIC RENAL IMPAIRMENT, STAGE 3 (MODERATE), UNSPECIFIED WHETHER STAGE 3A OR 3B CKD: ICD-10-CM

## 2022-02-10 DIAGNOSIS — Z78.0 POST-MENOPAUSAL: ICD-10-CM

## 2022-02-10 DIAGNOSIS — E03.9 ACQUIRED HYPOTHYROIDISM: ICD-10-CM

## 2022-02-10 DIAGNOSIS — K21.9 GASTROESOPHAGEAL REFLUX DISEASE, UNSPECIFIED WHETHER ESOPHAGITIS PRESENT: ICD-10-CM

## 2022-02-10 DIAGNOSIS — I10 BENIGN ESSENTIAL HYPERTENSION: Primary | ICD-10-CM

## 2022-02-10 DIAGNOSIS — M51.37 DEGENERATION OF INTERVERTEBRAL DISC OF LUMBOSACRAL REGION: ICD-10-CM

## 2022-02-10 DIAGNOSIS — M79.7 FIBROMYALGIA: ICD-10-CM

## 2022-02-10 DIAGNOSIS — I82.220 INFERIOR VENA CAVA OCCLUSION: ICD-10-CM

## 2022-02-10 PROCEDURE — G0439 PPPS, SUBSEQ VISIT: HCPCS | Performed by: INTERNAL MEDICINE

## 2022-02-10 PROCEDURE — 99214 OFFICE O/P EST MOD 30 MIN: CPT | Performed by: INTERNAL MEDICINE

## 2022-02-10 PROCEDURE — 1170F FXNL STATUS ASSESSED: CPT | Performed by: INTERNAL MEDICINE

## 2022-02-10 PROCEDURE — 1159F MED LIST DOCD IN RCRD: CPT | Performed by: INTERNAL MEDICINE

## 2022-02-10 NOTE — PROGRESS NOTES
The ABCs of the Annual Wellness Visit  Subsequent Medicare Wellness Visit    Chief Complaint   Patient presents with   • Medicare Wellness-subsequent      Subjective    History of Present Illness:  Alison Colvin is a 71 y.o. female who presents for a Subsequent Medicare Wellness Visit.  She is also here for follow-up on her hypertension, paroxysmal atrial fibrillation and vena cava occlusion, hypothyroid, GERD, chronic renal disease stage III and fibromyalgia.  She is generally stable and has no real new acute complaints.  She does have mobility issues and is in a power wheelchair.    The following portions of the patient's history were reviewed and   updated as appropriate: allergies, current medications, past family history, past medical history, past social history, past surgical history and problem list.    Compared to one year ago, the patient feels her physical   health is the same.    Compared to one year ago, the patient feels her mental   health is the same.    Recent Hospitalizations:  She was not admitted to the hospital during the last year.       Current Medical Providers:  Patient Care Team:  Nathan Shipman MD as PCP - General (Internal Medicine)  Dillon Herron Jr., MD (Inactive) as PCP - Family Medicine    Outpatient Medications Prior to Visit   Medication Sig Dispense Refill   • allopurinol (ZYLOPRIM) 100 MG tablet Take 1 tablet by mouth Daily. 90 tablet 3   • baclofen (LIORESAL) 10 MG tablet TAKE 1 TABLET BY MOUTH  TWICE DAILY 180 tablet 1   • BROVANA 15 MCG/2ML nebulizer solution USE 1 VIAL PER NEBULIZER BID  5   • budesonide (PULMICORT) 1 MG/2ML nebulizer solution Pulmicort SUSP; Patient Sig: Pulmicort SUSP USE 1 UNIT DOSE VIA NEBULIZER TWICE DAILY; 0; 25-Mar-2013; Active     • calcium carbonate (OS-SEBASTIÁN) 600 MG tablet Take 600 mg by mouth 2 (two) times a day with meals.     • DULoxetine (CYMBALTA) 60 MG capsule TAKE 1 CAPSULE BY MOUTH  DAILY 90 capsule 3   • furosemide (LASIX) 20 MG  tablet TAKE 1 TABLET BY MOUTH  DAILY 90 tablet 3   • HYDROcodone-acetaminophen (NORCO)  MG per tablet TK 1 T PO Q 8 H PRF PAIN  0   • ipratropium-albuterol (DUO-NEB) 0.5-2.5 mg/mL nebulizer USE 1 VIAL PER NEBULIZER QID  5   • levothyroxine (SYNTHROID, LEVOTHROID) 50 MCG tablet TAKE 1 TABLET BY MOUTH  DAILY 90 tablet 3   • metoclopramide (REGLAN) 10 MG tablet TAKE 1 TABLET BY MOUTH 4  TIMES DAILY 360 tablet 1   • Multiple Vitamins-Minerals (WOMENS 50+ MULTI VITAMIN/MIN PO) Take 1 tablet by mouth daily.     • nystatin (MYCOSTATIN) 448166 UNIT/GM powder Apply  topically to the appropriate area as directed Every 12 (Twelve) Hours. 15 g 0   • OXYGEN-HELIUM IN Inhale.     • pantoprazole (PROTONIX) 40 MG EC tablet TAKE 1 TABLET BY MOUTH  TWICE DAILY 180 tablet 3   • potassium chloride (K-DUR,KLOR-CON) 20 MEQ CR tablet TAKE 1 TABLET BY MOUTH  TWICE DAILY 180 tablet 3   • predniSONE (DELTASONE) 5 MG tablet Take 1 tablet by mouth Daily. 90 tablet 1   • topiramate (TOPAMAX) 25 MG tablet TAKE 1 TABLET BY MOUTH  TWICE DAILY 180 tablet 3   • warfarin (COUMADIN) 4 MG tablet Take 1 tablet by mouth Every Night. Take as directed 90 tablet 3   • warfarin (COUMADIN) 5 MG tablet Alternate 5 mg with 2.5 mg every other day 90 tablet 3     No facility-administered medications prior to visit.       Opioid medication/s are on active medication list.  and I have evaluated her active treatment plan and pain score trends (see table).  There were no vitals filed for this visit.  I have reviewed the chart for potential of high risk medication and harmful drug interactions in the elderly.            Aspirin is not on active medication list.  Aspirin use is not indicated based on review of current medical condition/s. Risk of harm outweighs potential benefits.  .    Patient Active Problem List   Diagnosis   • Anemia   • Asthma   • Benign essential hypertension   • Chronic venous insufficiency   • Depression   • Degeneration of intervertebral  "disc of lumbosacral region   • Duodenal ulcer   • Gastroesophageal reflux disease   • Fibromyalgia   • Gastric ulcer   • Gastroparesis   • Hypothyroidism   • Migraine   • Peripheral neuropathy   • Restrictive lung disease   • Upper gastrointestinal hemorrhage   • Urinary incontinence   • Osteoarthritis of both hands   • Gout with tophus   • PAF (paroxysmal atrial fibrillation) (HCC)   • History of pulmonary embolism   • History of deep vein thrombophlebitis of lower extremity   • History of inferior vena caval filter placement   • Inferior vena cava occlusion (HCC)   • Chronic intermittent hypoxia with obstructive sleep apnea   • Chronic renal impairment, stage 3 (moderate) (HCC)   • Nocturnal hypoxia   • Obstructive sleep apnea syndrome   • Fever   • Anasarca   • Bacteremia   • Cellulitis due to MRSA   • Chest pain   • Colon cancer (HCC)   • Deep vein thrombosis (HCC)   • Diastolic dysfunction   • History of hysterectomy with oophorectomy   • Hypertension   • IBS (irritable bowel syndrome)   • Morbid obesity (HCC)   • Osteoarthritis of lumbar spine   • Pulmonary hypertension (HCC)   • S/P partial colectomy   • Stroke, thrombotic (HCC)     Advance Care Planning  Advance Directive is not on file.  ACP discussion was held with the patient during this visit. Patient has an advance directive (not in EMR), copy requested.          Objective    Vitals:    02/10/22 1319   BP: 121/70   Pulse: (!) 49   Temp: 96.6 °F (35.9 °C)   SpO2: 97%   Weight: Comment: PT IN WHEELCHAIR   Height: 157.5 cm (62.01\")     BMI Readings from Last 1 Encounters:   02/10/22 42.06 kg/m²   BMI is below normal parameters. Recommendations include: none (medical contraindication)  Body mass index is 42.06 kg/m².  BMI has not been calculated during today's encounter.     Does the patient have evidence of cognitive impairment? No    Physical Exam  Vitals (Patient's in a wheelchair as usual) and nursing note reviewed.   Constitutional:       General: She " is not in acute distress.     Appearance: Normal appearance. She is obese. She is not ill-appearing.   HENT:      Head: Normocephalic and atraumatic.   Eyes:      General: No scleral icterus.     Conjunctiva/sclera: Conjunctivae normal.   Cardiovascular:      Rate and Rhythm: Normal rate and regular rhythm.      Heart sounds: Normal heart sounds.   Pulmonary:      Effort: Pulmonary effort is normal. No respiratory distress.      Breath sounds: Normal breath sounds. No wheezing or rales.   Musculoskeletal:         General: Normal range of motion.      Comments: Patient is in a power wheelchair and uses it for mobility   Skin:     General: Skin is warm and dry.   Neurological:      General: No focal deficit present.      Mental Status: She is alert and oriented to person, place, and time.   Psychiatric:         Mood and Affect: Mood normal.         Behavior: Behavior normal.       Lab Results   Component Value Date    CHLPL 253 (H) 02/01/2022    TRIG 209 (H) 02/01/2022    HDL 76 02/01/2022     (H) 02/01/2022    VLDL 37 02/01/2022            HEALTH RISK ASSESSMENT    Smoking Status:  Social History     Tobacco Use   Smoking Status Never Smoker   Smokeless Tobacco Never Used     Alcohol Consumption:  Social History     Substance and Sexual Activity   Alcohol Use No     Fall Risk Screen:    STEADI Fall Risk Assessment was completed, and patient is at MODERATE risk for falls. Assessment completed on:2/10/2022    Depression Screening:  PHQ-2/PHQ-9 Depression Screening 2/10/2022   Little interest or pleasure in doing things 0   Feeling down, depressed, or hopeless 0   Trouble falling or staying asleep, or sleeping too much 0   Feeling tired or having little energy 3   Poor appetite or overeating 0   Feeling bad about yourself - or that you are a failure or have let yourself or your family down 0   Trouble concentrating on things, such as reading the newspaper or watching television 0   Moving or speaking so slowly  that other people could have noticed. Or the opposite - being so fidgety or restless that you have been moving around a lot more than usual 1   Thoughts that you would be better off dead, or of hurting yourself in some way 0   Total Score 4   If you checked off any problems, how difficult have these problems made it for you to do your work, take care of things at home, or get along with other people? -       Health Habits and Functional and Cognitive Screening:  Functional & Cognitive Status 2/10/2022   Do you have difficulty preparing food and eating? Yes   Do you have difficulty bathing yourself, getting dressed or grooming yourself? No   Do you have difficulty using the toilet? No   Do you have difficulty moving around from place to place? Yes   Do you have trouble with steps or getting out of a bed or a chair? No   Current Diet Well Balanced Diet   Dental Exam Up to date   Eye Exam Not up to date   Exercise (times per week) 0 times per week   Current Exercises Include No Regular Exercise   Current Exercise Activities Include -   Do you need help using the phone?  No   Are you deaf or do you have serious difficulty hearing?  No   Do you need help with transportation? Yes   Do you need help shopping? No   Do you need help preparing meals?  Yes   Do you need help with housework?  Yes   Do you need help with laundry? Yes   Do you need help taking your medications? No   Do you need help managing money? No   Do you ever drive or ride in a car without wearing a seat belt? No   Have you felt unusual stress, anger or loneliness in the last month? No   Who do you live with? Spouse   If you need help, do you have trouble finding someone available to you? No   Have you been bothered in the last four weeks by sexual problems? No   Do you have difficulty concentrating, remembering or making decisions? No       Age-appropriate Screening Schedule:  Refer to the list below for future screening recommendations based on patient's  age, sex and/or medical conditions. Orders for these recommended tests are listed in the plan section. The patient has been provided with a written plan.    Health Maintenance   Topic Date Due   • DXA SCAN  Never done   • ZOSTER VACCINE (1 of 2) Never done   • LIPID PANEL  02/01/2023   • MAMMOGRAM  12/13/2023   • TDAP/TD VACCINES (2 - Td or Tdap) 01/25/2029   • INFLUENZA VACCINE  Completed   • COLONOSCOPY  Discontinued              Assessment/Plan CBC is essentially normal.  CMP has a stable creatinine of 1.59 and other values were normal.  Lipid panel remains high with total cholesterol 253, HDL 76, .  TSH and free T4 were both normal and vitamin D level was normal  #1-hypertension controlled  #2-hyperlipidemia, will continue with diet control  #3-history of atrial fibrillation, vena caval occlusion, on anticoagulation  #4-GERD, stable on medication  #5-fibromyalgia and low back pain  #6-mobility issues, using a power wheelchair    CMS Preventative Services Quick Reference  Risk Factors Identified During Encounter  Cardiovascular Disease  Immunizations Discussed/Encouraged (specific Immunizations; Shingrix  The above risks/problems have been discussed with the patient.  Follow up actions/plans if indicated are seen below in the Assessment/Plan Section.  Pertinent information has been shared with the patient in the After Visit Summary.    There are no diagnoses linked to this encounter.    Follow Up: I recommended the patient consider the shingles immunizations.  I obviously would like her to lose some weight.  She will need follow-up mammograms as well as a bone density test in April and we will try and get those coordinated.  She will continue current medicines as now continue to monitor her Coumadin at home.  I will recheck her in 6 months with a CBC, CMP, lipid panel, TSH and free T4 and vitamin D level.  She will continue using the power wheelchair because of fall risk and generalized weakness and  mobility issues    No follow-ups on file.     An After Visit Summary and PPPS were made available to the patient.

## 2022-02-16 ENCOUNTER — TELEPHONE (OUTPATIENT)
Dept: FAMILY MEDICINE CLINIC | Facility: CLINIC | Age: 72
End: 2022-02-16

## 2022-02-16 NOTE — TELEPHONE ENCOUNTER
Caller: [facility]     Relationship to Patient: OTHER    Phone Number: [phone]    Reason for Call: [facility] CALLED TO REPORT AN OUT OF RANGE INR: 1.5 FOR 2/15

## 2022-02-23 ENCOUNTER — TELEPHONE (OUTPATIENT)
Dept: FAMILY MEDICINE CLINIC | Facility: CLINIC | Age: 72
End: 2022-02-23

## 2022-02-23 NOTE — TELEPHONE ENCOUNTER
Caller: LEONARDO     Relationship to Patient: OTHER    Phone Number: [phone]    Reason for Call: LEONARDO FROM [facility] CALLED TO REPORT AN OUT OF RANGE INR:  2.2 ON 2/22

## 2022-03-02 ENCOUNTER — TELEPHONE (OUTPATIENT)
Dept: FAMILY MEDICINE CLINIC | Facility: CLINIC | Age: 72
End: 2022-03-02

## 2022-03-02 NOTE — TELEPHONE ENCOUNTER
Caller: RIKA     Relationship to Patient: OTHER    Phone Number: [phone]    Reason for Call: RIKA FROM [facility] CALLED TO REPORT AN OUT OUT OF RANGE INR - 1.9 TAKEN YESTERDAY

## 2022-03-16 RX ORDER — WARFARIN SODIUM 1 MG/1
TABLET ORAL
Qty: 30 TABLET | Refills: 5 | Status: SHIPPED | OUTPATIENT
Start: 2022-03-16

## 2022-03-23 ENCOUNTER — TELEPHONE (OUTPATIENT)
Dept: FAMILY MEDICINE CLINIC | Facility: CLINIC | Age: 72
End: 2022-03-23

## 2022-03-29 ENCOUNTER — TELEPHONE (OUTPATIENT)
Dept: FAMILY MEDICINE CLINIC | Facility: CLINIC | Age: 72
End: 2022-03-29

## 2022-03-30 ENCOUNTER — TELEPHONE (OUTPATIENT)
Dept: FAMILY MEDICINE CLINIC | Facility: CLINIC | Age: 72
End: 2022-03-30

## 2022-03-30 NOTE — TELEPHONE ENCOUNTER
CHRISTI WOULD LIKE TO REPORT THAT THE PATIENT'S RECENT INR, TAKEN ON 03/29/2022, IS REPORTED AS A 1.7

## 2022-04-06 ENCOUNTER — TELEPHONE (OUTPATIENT)
Dept: FAMILY MEDICINE CLINIC | Facility: CLINIC | Age: 72
End: 2022-04-06

## 2022-04-06 NOTE — TELEPHONE ENCOUNTER
Caller: PROMISE    Relationship: Other    Best call back number: [phone]    What is the best time to reach you: ANY TIME    Who are you requesting to speak with (clinical staff, provider,  specific staff member): CLINICAL STAFF    What was the call regarding: PROMISE WITH [facility] CALLED TO REPORT PATIENT'S INR FROM YESTERDAY: 1.8    PLEASE ADVISE    Do you require a callback: NO

## 2022-04-11 DIAGNOSIS — I48.0 PAF (PAROXYSMAL ATRIAL FIBRILLATION): ICD-10-CM

## 2022-04-11 NOTE — TELEPHONE ENCOUNTER
Caller: Alison Colvin    Relationship: Self    Best call back number:[phone]    Requested Prescriptions:   Requested Prescriptions     Pending Prescriptions Disp Refills   • warfarin (COUMADIN) 4 MG tablet 90 tablet 3     Sig: Take 1 tablet by mouth Every Night. Take as directed        Pharmacy where request should be sent:  [facility]  [address]  [phone]    Additional details provided by patient:     Does the patient have less than a 3 day supply:  [x] Yes  [] No    Dot Hilton Rep   04/11/22 15:57 EDT

## 2022-04-12 RX ORDER — WARFARIN SODIUM 4 MG/1
4 TABLET ORAL NIGHTLY
Qty: 90 TABLET | Refills: 3 | Status: SHIPPED | OUTPATIENT
Start: 2022-04-12 | End: 2023-02-20

## 2022-04-13 ENCOUNTER — TELEPHONE (OUTPATIENT)
Dept: FAMILY MEDICINE CLINIC | Facility: CLINIC | Age: 72
End: 2022-04-13

## 2022-04-13 NOTE — TELEPHONE ENCOUNTER
MCKINLEY WITH [facility] CALLED IN WITH PT'S READING FROM 4/12/22 OF 1.4.    PLEASE CONTACT PT WITH ANY SCRIPT CHANGES -[phone]

## 2022-04-20 ENCOUNTER — TELEPHONE (OUTPATIENT)
Dept: FAMILY MEDICINE CLINIC | Facility: CLINIC | Age: 72
End: 2022-04-20

## 2022-04-20 NOTE — TELEPHONE ENCOUNTER
CALLING TO REPORT AN OUT OF RANGE INR THAT WAS TAKEN YESTERDAY.    INR: 1.5     CALLBACK IS: [phone]

## 2022-04-27 ENCOUNTER — TELEPHONE (OUTPATIENT)
Dept: FAMILY MEDICINE CLINIC | Facility: CLINIC | Age: 72
End: 2022-04-27

## 2022-04-27 NOTE — TELEPHONE ENCOUNTER
Caller: [facility]    Best call back number: [phone]    What was the call regarding: ROBERT WITH [facility] CALLED TO REPORT PATIENT'S INR FROM LAST NIGHT 4/26/22    INR= 1.5

## 2022-04-27 NOTE — TELEPHONE ENCOUNTER
THIS IS THE SAME REPORT AS THE ONE FAXED OVER. I CALLED PATIENT AND SHE IS TAKING 4MG DAILY EXCEPT ON Monday AND Thursday SHE TAKES 5MG. PLEASE ADVISE WHAT DOSAGE SHE NEEDS TO TAKE. THANK YOU.

## 2022-04-28 DIAGNOSIS — R92.8 ABNORMAL MAMMOGRAM: Primary | ICD-10-CM

## 2022-05-04 ENCOUNTER — TELEPHONE (OUTPATIENT)
Dept: FAMILY MEDICINE CLINIC | Facility: CLINIC | Age: 72
End: 2022-05-04

## 2022-05-04 NOTE — TELEPHONE ENCOUNTER
Vern with [facility] called to report patient's INR from yesterday 05/03/2022 being 2.3 Vern can be reached at [phone].    Federico

## 2022-05-05 ENCOUNTER — TELEPHONE (OUTPATIENT)
Dept: FAMILY MEDICINE CLINIC | Facility: CLINIC | Age: 72
End: 2022-05-05

## 2022-05-05 NOTE — TELEPHONE ENCOUNTER
DR. MCNEIL,     PATIENT IS TAKING 5MG ON Monday, Wednesday, AND Friday- AND 4MG ALL OTHER DAYS. PLEASE ADVISE. THANK YOU.

## 2022-05-05 NOTE — TELEPHONE ENCOUNTER
Yesenia from [facility] called to request a current progress note confirming that the pt still needs an electric chair.

## 2022-05-06 NOTE — TELEPHONE ENCOUNTER
LAST OFFICE VISIT NOTES HAVE BEEN FAXED TO LUIS FELIPE AT [facility] AT [phone]

## 2022-05-18 ENCOUNTER — TELEPHONE (OUTPATIENT)
Dept: FAMILY MEDICINE CLINIC | Facility: CLINIC | Age: 72
End: 2022-05-18

## 2022-05-18 NOTE — TELEPHONE ENCOUNTER
"LUIS FELIPE WITH [facility] CALLED NEEDING AN ADDENDUM FOR USING HER POWER CHAIR. NEEDS TO STATE \"PATIENT IS IN A POWER WHEELCHAIR\"    PLEASE FAX  TO  [phone]    CALL BACK NUMBER [phone]  "

## 2022-05-18 NOTE — TELEPHONE ENCOUNTER
"DR. MCNEIL,     CAN YOU PLEASE ADDEND YOUR LAST OFFICE VISIT NOTE ON PATIENT TO INCLUDE \"PATIENT IS IN A POWER WHEELCHAIR\"? THANK YOU.   "

## 2022-05-20 ENCOUNTER — HOSPITAL ENCOUNTER (OUTPATIENT)
Dept: ULTRASOUND IMAGING | Facility: HOSPITAL | Age: 72
Discharge: HOME | End: 2022-05-20

## 2022-05-20 ENCOUNTER — HOSPITAL ENCOUNTER (OUTPATIENT)
Dept: MAMMOGRAPHY | Facility: HOSPITAL | Age: 72
Discharge: HOME | End: 2022-05-20

## 2022-05-20 ENCOUNTER — HOSPITAL ENCOUNTER (OUTPATIENT)
Dept: BONE DENSITY | Facility: HOSPITAL | Age: 72
Discharge: HOME | End: 2022-05-20

## 2022-05-20 DIAGNOSIS — R92.8 ABNORMAL MAMMOGRAM: ICD-10-CM

## 2022-05-20 DIAGNOSIS — Z78.0 POST-MENOPAUSAL: ICD-10-CM

## 2022-05-20 PROCEDURE — 77066 DX MAMMO INCL CAD BI: CPT

## 2022-05-20 PROCEDURE — G0279 TOMOSYNTHESIS, MAMMO: HCPCS

## 2022-05-20 PROCEDURE — 77080 DXA BONE DENSITY AXIAL: CPT

## 2022-05-26 ENCOUNTER — TELEPHONE (OUTPATIENT)
Dept: FAMILY MEDICINE CLINIC | Facility: CLINIC | Age: 72
End: 2022-05-26

## 2022-06-09 ENCOUNTER — TELEPHONE (OUTPATIENT)
Dept: FAMILY MEDICINE CLINIC | Facility: CLINIC | Age: 72
End: 2022-06-09

## 2022-06-15 ENCOUNTER — TELEPHONE (OUTPATIENT)
Dept: FAMILY MEDICINE CLINIC | Facility: CLINIC | Age: 72
End: 2022-06-15

## 2022-06-29 ENCOUNTER — TELEPHONE (OUTPATIENT)
Dept: FAMILY MEDICINE CLINIC | Facility: CLINIC | Age: 72
End: 2022-06-29

## 2022-06-29 NOTE — TELEPHONE ENCOUNTER
Caller: ZACH    Relationship:     Best call back number: [phone]    Who are you requesting to speak with (clinical staff, provider,  specific staff member): DR CHA    What was the call regarding: ZACH WITH [facility] CALLED TO UPDATE AN OUT OF RANGE INR FOR THE PATIENT    INR 6/28/22 = 3.6

## 2022-06-29 NOTE — TELEPHONE ENCOUNTER
DR. MCNEIL,     PLEASE SEE BELOW AND ADVISE. PT IS CURRENTLY TAKING 4MG FOUR DAYS A WEEK, AND 5MG THREE DAYS A WEEK

## 2022-07-14 ENCOUNTER — TELEPHONE (OUTPATIENT)
Dept: FAMILY MEDICINE CLINIC | Facility: CLINIC | Age: 72
End: 2022-07-14

## 2022-07-14 NOTE — TELEPHONE ENCOUNTER
Caller: NAVYA    Relationship: [facility]    Best call back number: [phone]    Caller requesting test results: PCP    What test was performed: INR    When was the test performed: 07/13/22    Where was the test performed: LAB    Additional notes: 1.8

## 2022-07-28 NOTE — TELEPHONE ENCOUNTER
Caller: Alison Colvin    Relationship: Self    Best call back number: [phone]    Requested Prescriptions:   Requested Prescriptions     Pending Prescriptions Disp Refills   • topiramate (TOPAMAX) 25 MG tablet 180 tablet 3     Sig: Take 1 tablet by mouth 2 (Two) Times a Day.   • predniSONE (DELTASONE) 5 MG tablet 90 tablet 1     Sig: Take 1 tablet by mouth Daily.      - tizanidine, 4mg    Pharmacy where request should be sent: [facility]  ([facility]) - [facility] [address] [phone] [facility] [phone] FX     Additional details provided by patient: THE PATIENT ONLY HAS ONE WEEK LEFT OF THE ABOVE MEDICATIONS.    Does the patient have less than a 3 day supply:  [] Yes  [x] No    Dot Shafer Rep   07/28/22 14:41 EDT

## 2022-07-29 RX ORDER — TOPIRAMATE 25 MG/1
25 TABLET ORAL 2 TIMES DAILY
Qty: 180 TABLET | Refills: 3 | Status: SHIPPED | OUTPATIENT
Start: 2022-07-29

## 2022-07-29 RX ORDER — PREDNISONE 1 MG/1
5 TABLET ORAL DAILY
Qty: 90 TABLET | Refills: 1 | Status: SHIPPED | OUTPATIENT
Start: 2022-07-29 | End: 2022-12-27

## 2022-08-03 RX ORDER — TIZANIDINE 4 MG/1
4 TABLET ORAL 2 TIMES DAILY
Qty: 180 TABLET | Refills: 1 | Status: SHIPPED | OUTPATIENT
Start: 2022-08-03 | End: 2022-12-27

## 2022-08-03 NOTE — TELEPHONE ENCOUNTER
Rx Refill Note  Requested Prescriptions      No prescriptions requested or ordered in this encounter      Last office visit with prescribing clinician: 2/10/2022      Next office visit with prescribing clinician: 8/17/2022            Jill Pimentel MA  08/03/22, 14:49 EDT

## 2022-08-03 NOTE — TELEPHONE ENCOUNTER
DR. MCNEIL,     PLEASE SEE BELOW AND ADVISE. PATIENT IS CURRENTLY TAKING 5MG ON Monday, Wednesday, AND Friday AND 4MG ALL OTHER DAYS. THANK YOU.

## 2022-08-03 NOTE — TELEPHONE ENCOUNTER
Have her hold her Coumadin for 2 days then decrease to 5 mg 2 days a week and 4 mg the other 5 days

## 2022-08-03 NOTE — TELEPHONE ENCOUNTER
Caller: REINA BARRAZA    Relationship: Lab    Best call back number: [phone]    What was the call regarding: REINA IS CALLING WITH PATIENT'S INR. PATIENT'S INR IS 3.6 AND WAS TAKEN YESTERDAY, 08/02/2022.    REINA STATED THAT PATIENT IS SELF TESTING AT HOME SO ANY INSTRUCTIONS SHOULD BE CALLED IN DIRECTLY TO PATIENT. PLEASE ADVISE.

## 2022-08-04 DIAGNOSIS — E55.9 VITAMIN D DEFICIENCY, UNSPECIFIED: ICD-10-CM

## 2022-08-04 DIAGNOSIS — E78.5 HYPERLIPIDEMIA, UNSPECIFIED HYPERLIPIDEMIA TYPE: Primary | ICD-10-CM

## 2022-08-04 DIAGNOSIS — E03.9 ACQUIRED HYPOTHYROIDISM: ICD-10-CM

## 2022-08-11 ENCOUNTER — TELEPHONE (OUTPATIENT)
Dept: FAMILY MEDICINE CLINIC | Facility: CLINIC | Age: 72
End: 2022-08-11

## 2022-08-11 NOTE — TELEPHONE ENCOUNTER
DR. MCNEIL,     PLEASE SEE BELOW AND ADVISE. PT IS CURRENTLY TAKING 5MG TWO DAYS A WEEK AND 4MG THE OTHER 5 DAYS. THANK YOU.

## 2022-08-11 NOTE — TELEPHONE ENCOUNTER
Caller: REN    Relationship: Home Health    Best call back number: [phone]    What is the best time to reach you: ANY    Who are you requesting to speak with (clinical staff, provider,  specific staff member): CLINICAL    Do you know the name of the person who called: REN    What was the call regarding: OUT OF RANGE INR 1.9 TAKEN YESTERDAY 08/11    Do you require a callback: NO

## 2022-08-17 ENCOUNTER — OFFICE VISIT (OUTPATIENT)
Dept: FAMILY MEDICINE CLINIC | Facility: CLINIC | Age: 72
End: 2022-08-17

## 2022-08-17 VITALS
RESPIRATION RATE: 16 BRPM | SYSTOLIC BLOOD PRESSURE: 130 MMHG | DIASTOLIC BLOOD PRESSURE: 80 MMHG | BODY MASS INDEX: 42.06 KG/M2 | HEIGHT: 62 IN

## 2022-08-17 DIAGNOSIS — I10 BENIGN ESSENTIAL HYPERTENSION: Primary | ICD-10-CM

## 2022-08-17 DIAGNOSIS — N18.30 CHRONIC RENAL IMPAIRMENT, STAGE 3 (MODERATE), UNSPECIFIED WHETHER STAGE 3A OR 3B CKD: ICD-10-CM

## 2022-08-17 DIAGNOSIS — M79.7 FIBROMYALGIA: ICD-10-CM

## 2022-08-17 DIAGNOSIS — E66.01 MORBID OBESITY: ICD-10-CM

## 2022-08-17 DIAGNOSIS — D64.9 ANEMIA, UNSPECIFIED TYPE: ICD-10-CM

## 2022-08-17 DIAGNOSIS — M47.816 SPONDYLOSIS OF LUMBAR REGION WITHOUT MYELOPATHY OR RADICULOPATHY: ICD-10-CM

## 2022-08-17 DIAGNOSIS — I87.2 CHRONIC VENOUS INSUFFICIENCY: ICD-10-CM

## 2022-08-17 DIAGNOSIS — I48.0 PAF (PAROXYSMAL ATRIAL FIBRILLATION): ICD-10-CM

## 2022-08-17 DIAGNOSIS — K21.9 GASTROESOPHAGEAL REFLUX DISEASE, UNSPECIFIED WHETHER ESOPHAGITIS PRESENT: ICD-10-CM

## 2022-08-17 DIAGNOSIS — E03.9 ACQUIRED HYPOTHYROIDISM: ICD-10-CM

## 2022-08-17 PROCEDURE — 99214 OFFICE O/P EST MOD 30 MIN: CPT | Performed by: INTERNAL MEDICINE

## 2022-08-17 RX ORDER — METOCLOPRAMIDE 10 MG/1
10 TABLET ORAL 4 TIMES DAILY
Qty: 360 TABLET | Refills: 3 | Status: SHIPPED | OUTPATIENT
Start: 2022-08-17

## 2022-08-17 NOTE — PROGRESS NOTES
Subjective   Alison Colvin is a 71 y.o. female. Patient is here today for follow-up on her Coumadin therapy, hypertension, chronic venous insufficiency, history of paroxysmal atrial fibrillation, DVT and PE.  She also has hypothyroidism, obesity and chronic renal disease stage III.  The patient is in a wheelchair but is having no acute problems otherwise  Chief Complaint   Patient presents with   • Results          Vitals:    08/17/22 1307   BP: 130/80   Resp: 16     Body mass index is 42.06 kg/m².  The following portions of the patient's history were reviewed and updated as appropriate: allergies, current medications, past family history, past medical history, past social history, past surgical history and problem list.    Past Medical History:   Diagnosis Date   • Abnormal nuclear stress test    • Acute sinusitis    • Allergy    • Benign essential hypertension    • Cellulitis    • Chest pain    • Colon cancer (HCC)    • COPD (chronic obstructive pulmonary disease) ([facility])    • Disease of thyroid gland    • Edema of leg    • Epilepsy (HCC)    • Esophageal reflux    • Foot pain    • Myalgia and myositis    • Onychomycosis of toenail    • Transient cerebral ischemia    • URI (upper respiratory infection)       Allergies   Allergen Reactions   • Pneumococcal Vaccines Delirium     Fever, chills   • Asa [Aspirin]    • Levofloxacin    • Meperidine Other (See Comments)   • Naproxen    • Pregabalin Other (See Comments)   • Propoxyphene    • Sulfa Antibiotics Other (See Comments)      Social History     Socioeconomic History   • Marital status:    Tobacco Use   • Smoking status: Never Smoker   • Smokeless tobacco: Never Used   Substance and Sexual Activity   • Alcohol use: No   • Drug use: No   • Sexual activity: Defer        Current Outpatient Medications:   •  metoclopramide (REGLAN) 10 MG tablet, Take 1 tablet by mouth 4 (Four) Times a Day., Disp: 360 tablet, Rfl: 3  •  allopurinol (ZYLOPRIM) 100 MG tablet, Take 1  tablet by mouth Daily., Disp: 90 tablet, Rfl: 3  •  baclofen (LIORESAL) 10 MG tablet, TAKE 1 TABLET BY MOUTH  TWICE DAILY, Disp: 180 tablet, Rfl: 1  •  BROVANA 15 MCG/2ML nebulizer solution, USE 1 VIAL PER NEBULIZER BID, Disp: , Rfl: 5  •  budesonide (PULMICORT) 1 MG/2ML nebulizer solution, Pulmicort SUSP; Patient Sig: Pulmicort SUSP USE 1 UNIT DOSE VIA NEBULIZER TWICE DAILY; 0; 25-Mar-2013; Active, Disp: , Rfl:   •  calcium carbonate (OS-SEBASTIÁN) 600 MG tablet, Take 600 mg by mouth 2 (two) times a day with meals., Disp: , Rfl:   •  DULoxetine (CYMBALTA) 60 MG capsule, TAKE 1 CAPSULE BY MOUTH  DAILY, Disp: 90 capsule, Rfl: 3  •  furosemide (LASIX) 20 MG tablet, TAKE 1 TABLET BY MOUTH  DAILY, Disp: 90 tablet, Rfl: 3  •  HYDROcodone-acetaminophen (NORCO)  MG per tablet, TK 1 T PO Q 8 H PRF PAIN, Disp: , Rfl: 0  •  ipratropium-albuterol (DUO-NEB) 0.5-2.5 mg/mL nebulizer, USE 1 VIAL PER NEBULIZER QID, Disp: , Rfl: 5  •  levothyroxine (SYNTHROID, LEVOTHROID) 50 MCG tablet, TAKE 1 TABLET BY MOUTH  DAILY, Disp: 90 tablet, Rfl: 3  •  Multiple Vitamins-Minerals (WOMENS 50+ MULTI VITAMIN/MIN PO), Take 1 tablet by mouth daily., Disp: , Rfl:   •  nystatin (MYCOSTATIN) 195576 UNIT/GM powder, Apply  topically to the appropriate area as directed Every 12 (Twelve) Hours., Disp: 15 g, Rfl: 0  •  OXYGEN-HELIUM IN, Inhale., Disp: , Rfl:   •  pantoprazole (PROTONIX) 40 MG EC tablet, TAKE 1 TABLET BY MOUTH  TWICE DAILY, Disp: 180 tablet, Rfl: 3  •  potassium chloride (K-DUR,KLOR-CON) 20 MEQ CR tablet, TAKE 1 TABLET BY MOUTH  TWICE DAILY, Disp: 180 tablet, Rfl: 3  •  predniSONE (DELTASONE) 5 MG tablet, Take 1 tablet by mouth Daily., Disp: 90 tablet, Rfl: 1  •  tiZANidine (ZANAFLEX) 4 MG tablet, Take 1 tablet by mouth 2 (Two) Times a Day., Disp: 180 tablet, Rfl: 1  •  topiramate (TOPAMAX) 25 MG tablet, Take 1 tablet by mouth 2 (Two) Times a Day., Disp: 180 tablet, Rfl: 3  •  warfarin (Coumadin) 1 MG tablet, Take 1 tablet twice a week in  addition to her 4 mg daily, Disp: 30 tablet, Rfl: 5  •  warfarin (COUMADIN) 4 MG tablet, Take 1 tablet by mouth Every Night. Take as directed, Disp: 90 tablet, Rfl: 3     Objective     History of Present Illness     Review of Systems    Physical Exam  Vitals and nursing note reviewed.   Constitutional:       General: She is not in acute distress.     Appearance: Normal appearance. She is obese. She is not ill-appearing.   HENT:      Head: Normocephalic and atraumatic.   Cardiovascular:      Rate and Rhythm: Normal rate and regular rhythm.      Heart sounds: Normal heart sounds.   Pulmonary:      Effort: Pulmonary effort is normal. No respiratory distress.      Breath sounds: Normal breath sounds. No wheezing or rales.   Musculoskeletal:      Right lower leg: No edema.      Left lower leg: No edema.   Skin:     General: Skin is warm and dry.   Neurological:      General: No focal deficit present.      Mental Status: She is alert and oriented to person, place, and time.   Psychiatric:         Mood and Affect: Mood normal.         Behavior: Behavior normal.         ASSESSMENT most recent INR was 3.1.  CBC was essentially normal.  CMP had a sugar of 101, stable creatinine of 1.57 and was otherwise normal.  Lipid panel is stable with total cholesterol 217, HDL 68, .  TSH and free T4 were both normal and vitamin D level was normal at 34.2.  #1-chronic anticoagulation on Coumadin, therapeutic  #2-hypertension controlled on medication  #3-hyperlipidemia, diet controlled  #4-chronic kidney disease stage III, asymptomatic and stable  #5-hypothyroidism, euthyroid on replacement     Problems Addressed this Visit        Cardiac and Vasculature    Benign essential hypertension - Primary    Chronic venous insufficiency    PAF (paroxysmal atrial fibrillation) (HCC)       Endocrine and Metabolic    Hypothyroidism       Gastrointestinal Abdominal     Gastroesophageal reflux disease    Relevant Medications    metoclopramide  (REGLAN) 10 MG tablet       Genitourinary and Reproductive     Chronic renal impairment, stage 3 (moderate) ([facility])       Hematology and Neoplasia    Anemia       Musculoskeletal and Injuries    Fibromyalgia       Neuro    Osteoarthritis of lumbar spine      Diagnoses       Codes Comments    Benign essential hypertension    -  Primary ICD-10-CM: I10  ICD-9-CM: 401.1     Chronic venous insufficiency     ICD-10-CM: I87.2  ICD-9-CM: 459.81     PAF (paroxysmal atrial fibrillation) ([facility])     ICD-10-CM: I48.0  ICD-9-CM: 427.31     Acquired hypothyroidism     ICD-10-CM: E03.9  ICD-9-CM: 244.9     Gastroesophageal reflux disease, unspecified whether esophagitis present     ICD-10-CM: K21.9  ICD-9-CM: 530.81     Chronic renal impairment, stage 3 (moderate), unspecified whether stage 3a or 3b CKD ([facility])     ICD-10-CM: N18.30  ICD-9-CM: 585.3     Anemia, unspecified type     ICD-10-CM: D64.9  ICD-9-CM: 285.9     Fibromyalgia     ICD-10-CM: M79.7  ICD-9-CM: 729.1     Spondylosis of lumbar region without myelopathy or radiculopathy     ICD-10-CM: M47.816  ICD-9-CM: 721.3           PLAN the patient will continue current medicines as now and continue her current Coumadin dose.  I recommended she get a flu shot in October and a fourth COVID-19 vaccination.  I also encouraged her to get the shingles immunizations.  I plan on rechecking her in 6 months with laboratory studies    There are no Patient Instructions on file for this visit.  No follow-ups on file.

## 2022-08-24 ENCOUNTER — TELEPHONE (OUTPATIENT)
Dept: FAMILY MEDICINE CLINIC | Facility: CLINIC | Age: 72
End: 2022-08-24

## 2022-08-24 NOTE — TELEPHONE ENCOUNTER
DR. MCNEIL,     PLEASE SEE BELOW AND ADVISE. PATIENT IS TAKING 5MG TWO DAYS A WEEK AND 4MG THE OTHER FIVE DAYS A WEEK.

## 2022-08-31 ENCOUNTER — TELEPHONE (OUTPATIENT)
Dept: FAMILY MEDICINE CLINIC | Facility: CLINIC | Age: 72
End: 2022-08-31

## 2022-08-31 NOTE — TELEPHONE ENCOUNTER
Caller: Alison Colvin    Relationship: Self    Best call back number: [phone]    Caller requesting test results: DEXA SCAN RESULTS FROM 5/22    Additional notes: PLEASE CONTACT HER REGARDING HER RESULTS

## 2022-08-31 NOTE — TELEPHONE ENCOUNTER
DR. MCNEIL,     WHEN PATIENT WAS HERE A COUPLE WEEKS AGO, YOU ALL DID NOT GO OVER THE DEXA SCAN AND TREATMENT OPTIONS AND SHE IS WANTING TO KNOW THE RESULTS/TREATMENT? PLEASE ADVISE. THANK YOU.

## 2022-08-31 NOTE — TELEPHONE ENCOUNTER
Overall the bones were reasonable except for some borderline osteoporosis in the forearm.  I would like her to take a calcium vitamin D supplement such as Citracal or Caltrate, or else take vitamin D 1000 units daily.  We will want to repeat the bone density test next year

## 2022-09-07 ENCOUNTER — TELEPHONE (OUTPATIENT)
Dept: FAMILY MEDICINE CLINIC | Facility: CLINIC | Age: 72
End: 2022-09-07

## 2022-09-07 NOTE — TELEPHONE ENCOUNTER
Caller: ARPAN    Relationship: Other    Best call back number: [phone]    What is the best time to reach you: ANY TIME    Who are you requesting to speak with (clinical staff, provider,  specific staff member): CLINICAL STAFF    What was the call regarding: MD WALKER CALLED TO GIVE PATIENT'S OUT OF RANGE INR RESULTS FROM 9/6:     4.0   PATIENT IS SELF TESTING AT HOME    PLEASE ADVISE PATIENT OF ANY MEDICATION CHANGES.      Do you require a callback: YES

## 2022-09-08 DIAGNOSIS — M1A.9XX1 GOUT WITH TOPHUS: ICD-10-CM

## 2022-09-08 RX ORDER — BACLOFEN 10 MG/1
TABLET ORAL
Qty: 180 TABLET | Refills: 1 | Status: SHIPPED | OUTPATIENT
Start: 2022-09-08

## 2022-09-08 RX ORDER — LEVOTHYROXINE SODIUM 0.05 MG/1
50 TABLET ORAL DAILY
Qty: 90 TABLET | Refills: 3 | Status: SHIPPED | OUTPATIENT
Start: 2022-09-08

## 2022-09-08 RX ORDER — ALLOPURINOL 100 MG/1
100 TABLET ORAL DAILY
Qty: 90 TABLET | Refills: 3 | Status: SHIPPED | OUTPATIENT
Start: 2022-09-08

## 2022-09-08 RX ORDER — FUROSEMIDE 20 MG/1
20 TABLET ORAL DAILY
Qty: 90 TABLET | Refills: 3 | Status: SHIPPED | OUTPATIENT
Start: 2022-09-08

## 2022-09-08 RX ORDER — DULOXETIN HYDROCHLORIDE 60 MG/1
60 CAPSULE, DELAYED RELEASE ORAL DAILY
Qty: 90 CAPSULE | Refills: 3 | Status: SHIPPED | OUTPATIENT
Start: 2022-09-08

## 2022-09-08 RX ORDER — POTASSIUM CHLORIDE 20 MEQ/1
TABLET, EXTENDED RELEASE ORAL
Qty: 180 TABLET | Refills: 3 | Status: SHIPPED | OUTPATIENT
Start: 2022-09-08

## 2022-09-27 ENCOUNTER — OFFICE VISIT (OUTPATIENT)
Dept: FAMILY MEDICINE CLINIC | Facility: CLINIC | Age: 72
End: 2022-09-27

## 2022-09-27 VITALS
OXYGEN SATURATION: 91 % | DIASTOLIC BLOOD PRESSURE: 80 MMHG | SYSTOLIC BLOOD PRESSURE: 130 MMHG | WEIGHT: 293 LBS | TEMPERATURE: 98.2 F | HEIGHT: 62 IN | BODY MASS INDEX: 53.92 KG/M2 | RESPIRATION RATE: 18 BRPM | HEART RATE: 68 BPM

## 2022-09-27 DIAGNOSIS — R60.0 EDEMA OF LEG: Primary | ICD-10-CM

## 2022-09-27 DIAGNOSIS — L03.115 CELLULITIS OF RIGHT LOWER EXTREMITY: ICD-10-CM

## 2022-09-27 PROCEDURE — 99214 OFFICE O/P EST MOD 30 MIN: CPT | Performed by: INTERNAL MEDICINE

## 2022-09-27 RX ORDER — CEPHALEXIN 500 MG/1
500 CAPSULE ORAL 3 TIMES DAILY
Qty: 30 CAPSULE | Refills: 0 | Status: SHIPPED | OUTPATIENT
Start: 2022-09-27 | End: 2022-10-06

## 2022-09-27 NOTE — PROGRESS NOTES
Subjective   Alison Colvin is a 71 y.o. female. Patient is here today for a sore on her right lower leg and ankle area.  Its been present about a week and has been oozing.  She has been putting Neosporin on it and bandaging it  Chief Complaint   Patient presents with   • LEG WOUND     OPEN WOUND ON RIGHT LEG THAT IS LEAKING          Vitals:    09/27/22 0922   BP: 130/80   Pulse: 68   Resp: 18   Temp: 98.2 °F (36.8 °C)   SpO2: 91%     Body mass index is 54.86 kg/m².  The following portions of the patient's history were reviewed and updated as appropriate: allergies, current medications, past family history, past medical history, past social history, past surgical history and problem list.    Past Medical History:   Diagnosis Date   • Abnormal nuclear stress test    • Acute sinusitis    • Allergy    • Benign essential hypertension    • Cellulitis    • Chest pain    • Colon cancer ([facility])    • COPD (chronic obstructive pulmonary disease) ([facility])    • Disease of thyroid gland    • Edema of leg    • Epilepsy ([facility])    • Esophageal reflux    • Foot pain    • Myalgia and myositis    • Onychomycosis of toenail    • Transient cerebral ischemia    • URI (upper respiratory infection)       Allergies   Allergen Reactions   • Pneumococcal Vaccines Delirium     Fever, chills   • Asa [Aspirin]    • Levofloxacin    • Meperidine Other (See Comments)   • Naproxen    • Pregabalin Other (See Comments)   • Propoxyphene    • Sulfa Antibiotics Other (See Comments)      Social History     Socioeconomic History   • Marital status:    Tobacco Use   • Smoking status: Never Smoker   • Smokeless tobacco: Never Used   Vaping Use   • Vaping Use: Never used   Substance and Sexual Activity   • Alcohol use: No   • Drug use: No   • Sexual activity: Defer        Current Outpatient Medications:   •  allopurinol (ZYLOPRIM) 100 MG tablet, TAKE 1 TABLET BY MOUTH  DAILY, Disp: 90 tablet, Rfl: 3  •  baclofen (LIORESAL) 10 MG tablet, TAKE 1 TABLET BY  MOUTH  TWICE DAILY, Disp: 180 tablet, Rfl: 1  •  BROVANA 15 MCG/2ML nebulizer solution, USE 1 VIAL PER NEBULIZER BID, Disp: , Rfl: 5  •  budesonide (PULMICORT) 1 MG/2ML nebulizer solution, Pulmicort SUSP; Patient Sig: Pulmicort SUSP USE 1 UNIT DOSE VIA NEBULIZER TWICE DAILY; 0; 25-Mar-2013; Active, Disp: , Rfl:   •  calcium carbonate (OS-SEBASTIÁN) 600 MG tablet, Take 600 mg by mouth 2 (two) times a day with meals., Disp: , Rfl:   •  DULoxetine (CYMBALTA) 60 MG capsule, TAKE 1 CAPSULE BY MOUTH  DAILY, Disp: 90 capsule, Rfl: 3  •  furosemide (LASIX) 20 MG tablet, TAKE 1 TABLET BY MOUTH  DAILY, Disp: 90 tablet, Rfl: 3  •  HYDROcodone-acetaminophen (NORCO)  MG per tablet, TK 1 T PO Q 8 H PRF PAIN, Disp: , Rfl: 0  •  ipratropium-albuterol (DUO-NEB) 0.5-2.5 mg/mL nebulizer, USE 1 VIAL PER NEBULIZER QID, Disp: , Rfl: 5  •  levothyroxine (SYNTHROID, LEVOTHROID) 50 MCG tablet, TAKE 1 TABLET BY MOUTH  DAILY, Disp: 90 tablet, Rfl: 3  •  metoclopramide (REGLAN) 10 MG tablet, Take 1 tablet by mouth 4 (Four) Times a Day., Disp: 360 tablet, Rfl: 3  •  Multiple Vitamins-Minerals (WOMENS 50+ MULTI VITAMIN/MIN PO), Take 1 tablet by mouth daily., Disp: , Rfl:   •  nystatin (MYCOSTATIN) 825147 UNIT/GM powder, Apply  topically to the appropriate area as directed Every 12 (Twelve) Hours., Disp: 15 g, Rfl: 0  •  OXYGEN-HELIUM IN, Inhale., Disp: , Rfl:   •  pantoprazole (PROTONIX) 40 MG EC tablet, TAKE 1 TABLET BY MOUTH  TWICE DAILY, Disp: 180 tablet, Rfl: 3  •  potassium chloride (K-DUR,KLOR-CON) 20 MEQ CR tablet, TAKE 1 TABLET BY MOUTH  TWICE DAILY, Disp: 180 tablet, Rfl: 3  •  predniSONE (DELTASONE) 5 MG tablet, Take 1 tablet by mouth Daily., Disp: 90 tablet, Rfl: 1  •  tiZANidine (ZANAFLEX) 4 MG tablet, Take 1 tablet by mouth 2 (Two) Times a Day., Disp: 180 tablet, Rfl: 1  •  topiramate (TOPAMAX) 25 MG tablet, Take 1 tablet by mouth 2 (Two) Times a Day., Disp: 180 tablet, Rfl: 3  •  warfarin (Coumadin) 1 MG tablet, Take 1 tablet twice  a week in addition to her 4 mg daily, Disp: 30 tablet, Rfl: 5  •  warfarin (COUMADIN) 4 MG tablet, Take 1 tablet by mouth Every Night. Take as directed, Disp: 90 tablet, Rfl: 3  •  cephalexin (Keflex) 500 MG capsule, Take 1 capsule by mouth 3 (Three) Times a Day., Disp: 30 capsule, Rfl: 0     Objective     History of Present Illness     Review of Systems    Physical Exam  Vitals and nursing note reviewed.   Constitutional:       General: She is not in acute distress.     Appearance: Normal appearance. She is obese. She is not ill-appearing.   Musculoskeletal:      Right lower leg: Edema present.      Left lower leg: Edema present.      Comments: 2+ edema in the lower legs   Skin:     General: Skin is warm and dry.      Comments: There is a dime sized area of open skin on the anterior upper right ankle that oozing slightly purulent appearing fluid.  There is some erythema surrounding it.   Neurological:      General: No focal deficit present.      Mental Status: She is alert and oriented to person, place, and time.   Psychiatric:         Mood and Affect: Mood normal.         Behavior: Behavior normal.         ASSESSMENT #1-dime sized ulcer on the anterior upper ankle on the right leg with some slight cellulitis surrounding     Problems Addressed this Visit        Infectious Diseases    Cellulitis of right lower extremity    Relevant Orders    Anaerobic & Aerobic Culture ([facility] Only) - Swab, Ankle, Right       Symptoms and Signs    Edema of leg - Primary      Diagnoses       Codes Comments    Edema of leg    -  Primary ICD-10-CM: R60.0  ICD-9-CM: 782.3     Cellulitis of right lower extremity     ICD-10-CM: L03.115  ICD-9-CM: 682.6           PLAN I want the patient to try and elevate the leg is much as she can.  She will increase the furosemide to 40 mg each morning.  A wound culture and sensitivity is pending.  I am starting the patient on Keflex 500 mg 3 times a day and want to recheck her in 1 week to see how the  leg is coming along    There are no Patient Instructions on file for this visit.  Return in about 1 week (around 10/4/2022).

## 2022-10-03 LAB
BACTERIA SPEC AEROBE CULT: ABNORMAL
BACTERIA SPEC ANAEROBE CULT: ABNORMAL
BACTERIA SPEC CULT: ABNORMAL
OTHER ANTIBIOTIC SUSC ISLT: ABNORMAL

## 2022-10-03 RX ORDER — PANTOPRAZOLE SODIUM 40 MG/1
TABLET, DELAYED RELEASE ORAL
Qty: 180 TABLET | Refills: 3 | Status: SHIPPED | OUTPATIENT
Start: 2022-10-03

## 2022-10-05 ENCOUNTER — TELEPHONE (OUTPATIENT)
Dept: FAMILY MEDICINE CLINIC | Facility: CLINIC | Age: 72
End: 2022-10-05

## 2022-10-05 NOTE — TELEPHONE ENCOUNTER
Provider:  DR ASHLIE Kohli MD INR    Phone Number:[phone]    Reason for Call:CALLINF TO REPORT  INR READING OF 5.4 ON 10/4/2022    ATTEMPTED TO WARM TRANSFER

## 2022-10-05 NOTE — TELEPHONE ENCOUNTER
DR. MCNEIL,     PLEASE SEE BELOW AND ADVISE. I CALLED PATIENT AND SHE IS CURRENTLY TAKING 5MG ON Tuesday AND Thursday AND 4MG THE OTHER DAYS.

## 2022-10-05 NOTE — TELEPHONE ENCOUNTER
She should hold her Coumadin completely for the next 2 days and then recheck pro time and INR.  I would decrease her dose to just 5 mg 1 day a week and 4 mg on the other days

## 2022-10-06 ENCOUNTER — OFFICE VISIT (OUTPATIENT)
Dept: FAMILY MEDICINE CLINIC | Facility: CLINIC | Age: 72
End: 2022-10-06

## 2022-10-06 VITALS
DIASTOLIC BLOOD PRESSURE: 60 MMHG | SYSTOLIC BLOOD PRESSURE: 90 MMHG | BODY MASS INDEX: 54.86 KG/M2 | RESPIRATION RATE: 18 BRPM | HEART RATE: 76 BPM | HEIGHT: 62 IN | TEMPERATURE: 97.8 F | OXYGEN SATURATION: 99 %

## 2022-10-06 DIAGNOSIS — L03.115 CELLULITIS OF RIGHT LOWER EXTREMITY: ICD-10-CM

## 2022-10-06 DIAGNOSIS — Z23 ENCOUNTER FOR VACCINATION: Primary | ICD-10-CM

## 2022-10-06 DIAGNOSIS — L97.811: ICD-10-CM

## 2022-10-06 DIAGNOSIS — R60.0 EDEMA OF LEG: ICD-10-CM

## 2022-10-06 PROCEDURE — 90662 IIV NO PRSV INCREASED AG IM: CPT | Performed by: INTERNAL MEDICINE

## 2022-10-06 PROCEDURE — 99214 OFFICE O/P EST MOD 30 MIN: CPT | Performed by: INTERNAL MEDICINE

## 2022-10-06 PROCEDURE — G0008 ADMIN INFLUENZA VIRUS VAC: HCPCS | Performed by: INTERNAL MEDICINE

## 2022-10-06 RX ORDER — AMOXICILLIN AND CLAVULANATE POTASSIUM 875; 125 MG/1; MG/1
1 TABLET, FILM COATED ORAL 2 TIMES DAILY
Qty: 20 TABLET | Refills: 1 | Status: SHIPPED | OUTPATIENT
Start: 2022-10-06 | End: 2022-10-18

## 2022-10-06 NOTE — PROGRESS NOTES
Subjective   Alison Colvin is a 72 y.o. female. Patient is here today for follow-up on the ulcer on her right shin.  Overall she is stable  Chief Complaint   Patient presents with   • Edema   • Cellulitis          Vitals:    10/06/22 1111   BP: 90/60   Pulse: 76   Resp: 18   Temp: 97.8 °F (36.6 °C)   SpO2: 99%     Body mass index is 54.86 kg/m².  The following portions of the patient's history were reviewed and updated as appropriate: allergies, current medications, past family history, past medical history, past social history, past surgical history and problem list.    Past Medical History:   Diagnosis Date   • Abnormal nuclear stress test    • Acute sinusitis    • Allergy    • Benign essential hypertension    • Cellulitis    • Chest pain    • Colon cancer (HCC)    • COPD (chronic obstructive pulmonary disease) ([facility])    • Disease of thyroid gland    • Edema of leg    • Epilepsy (HCC)    • Esophageal reflux    • Foot pain    • Myalgia and myositis    • Onychomycosis of toenail    • Transient cerebral ischemia    • URI (upper respiratory infection)       Allergies   Allergen Reactions   • Pneumococcal Vaccines Delirium     Fever, chills   • Asa [Aspirin]    • Levofloxacin    • Meperidine Other (See Comments)   • Naproxen    • Pregabalin Other (See Comments)   • Propoxyphene    • Sulfa Antibiotics Other (See Comments)      Social History     Socioeconomic History   • Marital status:    Tobacco Use   • Smoking status: Never Smoker   • Smokeless tobacco: Never Used   Vaping Use   • Vaping Use: Never used   Substance and Sexual Activity   • Alcohol use: No   • Drug use: No   • Sexual activity: Defer        Current Outpatient Medications:   •  allopurinol (ZYLOPRIM) 100 MG tablet, TAKE 1 TABLET BY MOUTH  DAILY, Disp: 90 tablet, Rfl: 3  •  baclofen (LIORESAL) 10 MG tablet, TAKE 1 TABLET BY MOUTH  TWICE DAILY, Disp: 180 tablet, Rfl: 1  •  BROVANA 15 MCG/2ML nebulizer solution, USE 1 VIAL PER NEBULIZER BID, Disp: ,  Rfl: 5  •  budesonide (PULMICORT) 1 MG/2ML nebulizer solution, Pulmicort SUSP; Patient Sig: Pulmicort SUSP USE 1 UNIT DOSE VIA NEBULIZER TWICE DAILY; 0; 25-Mar-2013; Active, Disp: , Rfl:   •  calcium carbonate (OS-SEBASTIÁN) 600 MG tablet, Take 600 mg by mouth 2 (two) times a day with meals., Disp: , Rfl:   •  DULoxetine (CYMBALTA) 60 MG capsule, TAKE 1 CAPSULE BY MOUTH  DAILY, Disp: 90 capsule, Rfl: 3  •  furosemide (LASIX) 20 MG tablet, TAKE 1 TABLET BY MOUTH  DAILY, Disp: 90 tablet, Rfl: 3  •  HYDROcodone-acetaminophen (NORCO)  MG per tablet, TK 1 T PO Q 8 H PRF PAIN, Disp: , Rfl: 0  •  ipratropium-albuterol (DUO-NEB) 0.5-2.5 mg/mL nebulizer, USE 1 VIAL PER NEBULIZER QID, Disp: , Rfl: 5  •  levothyroxine (SYNTHROID, LEVOTHROID) 50 MCG tablet, TAKE 1 TABLET BY MOUTH  DAILY, Disp: 90 tablet, Rfl: 3  •  metoclopramide (REGLAN) 10 MG tablet, Take 1 tablet by mouth 4 (Four) Times a Day., Disp: 360 tablet, Rfl: 3  •  Multiple Vitamins-Minerals (WOMENS 50+ MULTI VITAMIN/MIN PO), Take 1 tablet by mouth daily., Disp: , Rfl:   •  nystatin (MYCOSTATIN) 439434 UNIT/GM powder, Apply  topically to the appropriate area as directed Every 12 (Twelve) Hours., Disp: 15 g, Rfl: 0  •  OXYGEN-HELIUM IN, Inhale., Disp: , Rfl:   •  pantoprazole (PROTONIX) 40 MG EC tablet, TAKE 1 TABLET BY MOUTH  TWICE DAILY, Disp: 180 tablet, Rfl: 3  •  potassium chloride (K-DUR,KLOR-CON) 20 MEQ CR tablet, TAKE 1 TABLET BY MOUTH  TWICE DAILY, Disp: 180 tablet, Rfl: 3  •  predniSONE (DELTASONE) 5 MG tablet, Take 1 tablet by mouth Daily., Disp: 90 tablet, Rfl: 1  •  tiZANidine (ZANAFLEX) 4 MG tablet, Take 1 tablet by mouth 2 (Two) Times a Day., Disp: 180 tablet, Rfl: 1  •  topiramate (TOPAMAX) 25 MG tablet, Take 1 tablet by mouth 2 (Two) Times a Day., Disp: 180 tablet, Rfl: 3  •  warfarin (Coumadin) 1 MG tablet, Take 1 tablet twice a week in addition to her 4 mg daily, Disp: 30 tablet, Rfl: 5  •  warfarin (COUMADIN) 4 MG tablet, Take 1 tablet by mouth  Every Night. Take as directed, Disp: 90 tablet, Rfl: 3  •  amoxicillin-clavulanate (Augmentin) 875-125 MG per tablet, Take 1 tablet by mouth 2 (Two) Times a Day., Disp: 20 tablet, Rfl: 1     Objective     History of Present Illness     Review of Systems    Physical Exam  Vitals and nursing note reviewed.   Constitutional:       General: She is not in acute distress.     Appearance: Normal appearance. She is obese. She is not ill-appearing.   Skin:     General: Skin is warm and dry.      Comments: The ulcer on the right shin is possibly just slightly larger between dime and quarter sized with some dull redness around it but no angry hot erythema.  There continues to be slight whitish exudate   Neurological:      Mental Status: She is alert.         ASSESSMENT culture showed staph aureus, beta strep and Enterobacter   #1-continued ulcer with low-grade cellulitis on the right shin and not significantly improved with Keflex     Problems Addressed this Visit        Infectious Diseases    Cellulitis of right lower extremity       Musculoskeletal and Injuries    Skin ulcer of right pretibial region limited to breakdown of skin (HCC)       Symptoms and Signs    Edema of leg      Other Visit Diagnoses     Encounter for vaccination    -  Primary    Relevant Orders    Fluzone High-Dose 65+yrs (9800-8390) (Completed)      Diagnoses       Codes Comments    Encounter for vaccination    -  Primary ICD-10-CM: Z23  ICD-9-CM: V05.9     Edema of leg     ICD-10-CM: R60.0  ICD-9-CM: 782.3     Skin ulcer of right pretibial region limited to breakdown of skin (HCC)     ICD-10-CM: L97.811  ICD-9-CM: 707.19     Cellulitis of right lower extremity     ICD-10-CM: L03.115  ICD-9-CM: 682.6           PLAN based on culture results I am going to start her on Augmentin 875 mg twice a day.  I will be out of town next week but would like to recheck her in 2 weeks.  She was advised that if there is significant worsening that she needs to be examined  by one of the other doctors    There are no Patient Instructions on file for this visit.  Return in about 2 weeks (around 10/20/2022).

## 2022-10-18 ENCOUNTER — OFFICE VISIT (OUTPATIENT)
Dept: FAMILY MEDICINE CLINIC | Facility: CLINIC | Age: 72
End: 2022-10-18

## 2022-10-18 VITALS
HEART RATE: 68 BPM | DIASTOLIC BLOOD PRESSURE: 80 MMHG | OXYGEN SATURATION: 96 % | RESPIRATION RATE: 18 BRPM | SYSTOLIC BLOOD PRESSURE: 130 MMHG | HEIGHT: 62 IN | TEMPERATURE: 98 F | WEIGHT: 293 LBS | BODY MASS INDEX: 53.92 KG/M2

## 2022-10-18 DIAGNOSIS — L03.115 CELLULITIS OF RIGHT LOWER EXTREMITY: ICD-10-CM

## 2022-10-18 DIAGNOSIS — L97.811: ICD-10-CM

## 2022-10-18 DIAGNOSIS — I87.2 CHRONIC VENOUS INSUFFICIENCY: Primary | ICD-10-CM

## 2022-10-18 PROCEDURE — 99213 OFFICE O/P EST LOW 20 MIN: CPT | Performed by: INTERNAL MEDICINE

## 2022-10-18 RX ORDER — AMOXICILLIN AND CLAVULANATE POTASSIUM 875; 125 MG/1; MG/1
1 TABLET, FILM COATED ORAL 2 TIMES DAILY
Qty: 20 TABLET | Refills: 1 | Status: SHIPPED | OUTPATIENT
Start: 2022-10-18

## 2022-11-01 ENCOUNTER — TELEPHONE (OUTPATIENT)
Dept: FAMILY MEDICINE CLINIC | Facility: CLINIC | Age: 72
End: 2022-11-01

## 2022-11-01 DIAGNOSIS — L03.90 CELLULITIS DUE TO MRSA: ICD-10-CM

## 2022-11-01 DIAGNOSIS — B95.62 CELLULITIS DUE TO MRSA: ICD-10-CM

## 2022-11-01 DIAGNOSIS — L97.811: Primary | ICD-10-CM

## 2022-11-01 NOTE — TELEPHONE ENCOUNTER
Caller: Alison Colvin    Relationship: Self    Best call back number:   [phone]    What was the call regarding: PATIENT STATES THAT THE SPOT ON HER LEG HAS STARTED DRAINING REALLY BAD AGAIN. IT IS SOAKING THROUGH THE BANDAGES AND ANYTHING THAT GETS NEAR HER LEG.  PATIENT STATES SHE IS ON HER 4TH ROUND OF ANTIBIOTICS AND SHE WOULD LIKE TO KNOW IF THERE IS ANYTHING ELSE SHE CAN TAKE OR WHAT CAN SHE DO.     PLEASE CALL AND ADVISE     Do you require a callback: YES

## 2022-11-02 NOTE — TELEPHONE ENCOUNTER
CALLED AND S/W PT AND ADVISED WHAT DR. DAILEY SAID. PT VOICED UNDERSTANDING AND ORDER HAS BEEN PLACED.

## 2022-11-15 ENCOUNTER — TELEPHONE (OUTPATIENT)
Dept: FAMILY MEDICINE CLINIC | Facility: CLINIC | Age: 72
End: 2022-11-15

## 2022-11-15 NOTE — TELEPHONE ENCOUNTER
Caller: ANA    Relationship to patient:     Best call back number: [phone]    Patient is needing: IS CALLING IN TO REPORT THE PATIENTS INR READING WHICH IS 2.2 AS OF 11/11/22

## 2022-11-16 ENCOUNTER — OFFICE VISIT (OUTPATIENT)
Dept: WOUND CARE | Facility: HOSPITAL | Age: 72
End: 2022-11-16

## 2022-11-16 DIAGNOSIS — L97.809 NON-PRESSURE CHRONIC ULCER OF OTHER PART OF UNSPECIFIED LOWER LEG WITH UNSPECIFIED SEVERITY: ICD-10-CM

## 2022-11-16 PROCEDURE — 99212 OFFICE O/P EST SF 10 MIN: CPT

## 2022-11-16 PROCEDURE — G0463 HOSPITAL OUTPT CLINIC VISIT: HCPCS

## 2022-11-17 ENCOUNTER — TRANSCRIBE ORDERS (OUTPATIENT)
Dept: ADMINISTRATIVE | Facility: HOSPITAL | Age: 72
End: 2022-11-17

## 2022-11-17 DIAGNOSIS — I70.232 ATHEROSCLEROSIS OF NATIVE ARTERY OF RIGHT LOWER EXTREMITY WITH ULCERATION OF CALF: ICD-10-CM

## 2022-11-17 DIAGNOSIS — L97.812 ULCER OF RIGHT PRETIBIAL REGION, WITH FAT LAYER EXPOSED: Primary | ICD-10-CM

## 2022-11-17 DIAGNOSIS — M79.669 PAIN OF LOWER LEG, UNSPECIFIED LATERALITY: ICD-10-CM

## 2022-11-17 DIAGNOSIS — L97.812 NON-PRESSURE CHRONIC ULCER OF OTHER PART OF RIGHT LOWER LEG WITH FAT LAYER EXPOSED: ICD-10-CM

## 2022-11-17 DIAGNOSIS — I87.331 IDIOPATHIC CHRONIC VENOUS HYPERTENSION OF RIGHT LOWER EXTREMITY WITH ULCER AND INFLAMMATION: ICD-10-CM

## 2022-11-25 ENCOUNTER — HOSPITAL ENCOUNTER (OUTPATIENT)
Dept: CARDIOLOGY | Facility: HOSPITAL | Age: 72
Discharge: HOME | End: 2022-11-25

## 2022-11-25 DIAGNOSIS — I70.232 ATHEROSCLEROSIS OF NATIVE ARTERY OF RIGHT LOWER EXTREMITY WITH ULCERATION OF CALF: ICD-10-CM

## 2022-11-25 LAB
BH CV LOWER ARTERIAL LEFT ABI RATIO: 1.12
BH CV LOWER ARTERIAL LEFT DORSALIS PEDIS SYS MAX: 169
BH CV LOWER ARTERIAL LEFT GREAT TOE SYS MAX: 137
BH CV LOWER ARTERIAL LEFT POST TIBIAL SYS MAX: 172
BH CV LOWER ARTERIAL LEFT TBI RATIO: 0.9
BH CV LOWER ARTERIAL RIGHT ABI RATIO: 1.12
BH CV LOWER ARTERIAL RIGHT DORSALIS PEDIS SYS MAX: 161
BH CV LOWER ARTERIAL RIGHT GREAT TOE SYS MAX: 151
BH CV LOWER ARTERIAL RIGHT POST TIBIAL SYS MAX: 171
BH CV LOWER ARTERIAL RIGHT TBI RATIO: 0.99
MAXIMAL PREDICTED HEART RATE: 146 BPM
STRESS TARGET HR: 124 BPM
UPPER ARTERIAL LEFT ARM BRACHIAL SYS MAX: 145 MMHG
UPPER ARTERIAL RIGHT ARM BRACHIAL SYS MAX: 153 MMHG

## 2022-11-25 PROCEDURE — 93922 UPR/L XTREMITY ART 2 LEVELS: CPT

## 2022-11-28 ENCOUNTER — TELEPHONE (OUTPATIENT)
Dept: PODIATRY | Facility: CLINIC | Age: 72
End: 2022-11-28

## 2022-11-28 NOTE — TELEPHONE ENCOUNTER
----- Message from MT Doyle sent at 11/28/2022  6:49 AM EST -----  Please call patient and let her know that ALAN was normal

## 2022-11-30 ENCOUNTER — OFFICE VISIT (OUTPATIENT)
Dept: WOUND CARE | Facility: HOSPITAL | Age: 72
End: 2022-11-30

## 2022-11-30 DIAGNOSIS — L97.809 NON-PRESSURE CHRONIC ULCER OF OTHER PART OF UNSPECIFIED LOWER LEG WITH UNSPECIFIED SEVERITY: ICD-10-CM

## 2022-11-30 PROCEDURE — 99213 OFFICE O/P EST LOW 20 MIN: CPT

## 2022-12-07 ENCOUNTER — TELEPHONE (OUTPATIENT)
Dept: FAMILY MEDICINE CLINIC | Facility: CLINIC | Age: 72
End: 2022-12-07

## 2022-12-07 NOTE — TELEPHONE ENCOUNTER
Caller: SILVER    Relationship: Other    Best call back number: [phone]    What is the best time to reach you: ANY TIME    Who are you requesting to speak with (clinical staff, provider,  specific staff member): CLINICAL STAFF    What was the call regarding: SILVER WITH MD INR CALLED TO REPORT AN OUT OF RANGE INR OF 3.9 ON 12/6/22 FOR PATIENT.    Do you require a callback: YES    PLEASE ADVISE.

## 2022-12-07 NOTE — TELEPHONE ENCOUNTER
Hub staff attempted to follow warm transfer process and was unsuccessful     Caller: Alison Colvin    Relationship to patient: Self    Best call back number: [phone]    Patient is needing: RETURNING A MISSED CALL

## 2022-12-07 NOTE — TELEPHONE ENCOUNTER
CALLED AND LEFT MESSAGE FOR PT TO CONFIRM WHAT DOSAGE OF COUMADIN SHE IS ON. AWAITING CALL BACK, THEN WILL SEND TO DR. DAILEY.

## 2022-12-08 NOTE — TELEPHONE ENCOUNTER
DR. MCNEIL,     PATIENTS INR WAS 3.9 ON Tuesday AM. PATIENT HAS BEEN TAKING 4MG DAILY, BUT DID NOT TAKE ANY Tuesday OR Wednesday, PLEASE ADVISE. THANK YOU.

## 2022-12-12 ENCOUNTER — TELEPHONE (OUTPATIENT)
Dept: FAMILY MEDICINE CLINIC | Facility: CLINIC | Age: 72
End: 2022-12-12

## 2022-12-12 NOTE — TELEPHONE ENCOUNTER
Caller:     Relationship:   Alison Colvin (Self) [phone] (Mobile)         What medication are you requesting: ROPINIROLE     What are your current symptoms: RESTLESS LEG    How long have you been experiencing symptoms:     Have you had these symptoms before:    [x] Yes  [] No    Have you been treated for these symptoms before:   [x] Yes  [] No    If a prescription is needed, what is your preferred pharmacy and phone number:      Additional notes:

## 2022-12-14 ENCOUNTER — TELEPHONE (OUTPATIENT)
Dept: FAMILY MEDICINE CLINIC | Facility: CLINIC | Age: 72
End: 2022-12-14

## 2022-12-14 ENCOUNTER — OFFICE VISIT (OUTPATIENT)
Dept: WOUND CARE | Facility: HOSPITAL | Age: 72
End: 2022-12-14

## 2022-12-14 DIAGNOSIS — L97.812 ULCER OF RIGHT PRETIBIAL REGION, WITH FAT LAYER EXPOSED: Primary | ICD-10-CM

## 2022-12-14 PROCEDURE — 87147 CULTURE TYPE IMMUNOLOGIC: CPT

## 2022-12-14 PROCEDURE — 87070 CULTURE OTHR SPECIMN AEROBIC: CPT

## 2022-12-14 PROCEDURE — G0463 HOSPITAL OUTPT CLINIC VISIT: HCPCS

## 2022-12-14 PROCEDURE — 87186 SC STD MICRODIL/AGAR DIL: CPT

## 2022-12-14 PROCEDURE — 87205 SMEAR GRAM STAIN: CPT

## 2022-12-14 RX ORDER — ROPINIROLE 0.25 MG/1
0.25 TABLET, FILM COATED ORAL NIGHTLY
Qty: 30 TABLET | Refills: 0 | Status: SHIPPED | OUTPATIENT
Start: 2022-12-14 | End: 2022-12-14 | Stop reason: SDUPTHER

## 2022-12-14 RX ORDER — ROPINIROLE 0.25 MG/1
0.25 TABLET, FILM COATED ORAL NIGHTLY
Qty: 30 TABLET | Refills: 0 | Status: SHIPPED | OUTPATIENT
Start: 2022-12-14 | End: 2022-12-15 | Stop reason: SDUPTHER

## 2022-12-14 NOTE — TELEPHONE ENCOUNTER
Caller: NARDA PHELPS INR    Relationship: Provider    Best call back number: [phone]    What was the call regarding: OUT OF RANGE INR- 1.1 ON 12/13/22    Do you require a callback: NO

## 2022-12-15 RX ORDER — ROPINIROLE 0.25 MG/1
0.25 TABLET, FILM COATED ORAL NIGHTLY
Qty: 30 TABLET | Refills: 0 | Status: SHIPPED | OUTPATIENT
Start: 2022-12-15 | End: 2022-12-19 | Stop reason: SDUPTHER

## 2022-12-15 NOTE — TELEPHONE ENCOUNTER
PATIENT IS CALLING TO REQUEST IT GET SENT TO [facility] ([facility]) - [place], [place] - [address] [facility] [phone] [facility] [phone] FX  [employer] BECAUSE THEY DO NOT DRIVE. THANK YOU.

## 2022-12-16 LAB
BACTERIA SPEC AEROBE CULT: ABNORMAL
GRAM STN SPEC: ABNORMAL
GRAM STN SPEC: ABNORMAL

## 2022-12-19 RX ORDER — ROPINIROLE 0.25 MG/1
0.25 TABLET, FILM COATED ORAL NIGHTLY
Qty: 90 TABLET | Refills: 0 | Status: SHIPPED | OUTPATIENT
Start: 2022-12-19 | End: 2022-12-21 | Stop reason: SDUPTHER

## 2022-12-19 NOTE — TELEPHONE ENCOUNTER
Rx Refill Note  Requested Prescriptions      No prescriptions requested or ordered in this encounter      Last office visit with prescribing clinician: 10/18/2022   Last telemedicine visit with prescribing clinician: 2/16/2023   Next office visit with prescribing clinician: 2/22/2023                         Would you like a call back once the refill request has been completed: [] Yes [] No    If the office needs to give you a call back, can they leave a voicemail: [] Yes [] No    Jill Pimentel MA  12/19/22, 09:10 EST

## 2022-12-21 RX ORDER — ROPINIROLE 0.25 MG/1
0.25 TABLET, FILM COATED ORAL NIGHTLY
Qty: 90 TABLET | Refills: 0 | Status: SHIPPED | OUTPATIENT
Start: 2022-12-21

## 2022-12-21 NOTE — TELEPHONE ENCOUNTER
Caller: Alison Colvin    Relationship: Self    Best call back number: [phone]    Requested Prescriptions:   Requested Prescriptions     Pending Prescriptions Disp Refills   • rOPINIRole (Requip) 0.25 MG tablet 90 tablet 0     Sig: Take 1 tablet by mouth Every Night. Take 1 hour before bedtime.        Pharmacy where request should be sent: [facility] ([facility]) - [place], [place] - [address] [facility] [phone] [facility] [phone] FX     Additional details provided by patient: PATIENT IS OUT OF THIS MEDICATION    Does the patient have less than a 3 day supply:  [x] Yes  [] No    Would you like a call back once the refill request has been completed: [x] Yes [] No    If the office needs to give you a call back, can they leave a voicemail: [x] Yes [] No    Dot Jason Rep   12/21/22 15:31 EST

## 2022-12-22 ENCOUNTER — TELEPHONE (OUTPATIENT)
Dept: FAMILY MEDICINE CLINIC | Facility: CLINIC | Age: 72
End: 2022-12-22

## 2022-12-22 NOTE — TELEPHONE ENCOUNTER
Caller: MD INR    Relationship: Other    Best call back number:  [phone]   [phone]    What was the call regarding: INR 20TH OF December  @ 1.8     Do you require a callback: IF NEEDED

## 2022-12-27 RX ORDER — TIZANIDINE 4 MG/1
TABLET ORAL
Qty: 180 TABLET | Refills: 1 | Status: SHIPPED | OUTPATIENT
Start: 2022-12-27

## 2022-12-27 RX ORDER — PREDNISONE 1 MG/1
5 TABLET ORAL DAILY
Qty: 90 TABLET | Refills: 1 | Status: SHIPPED | OUTPATIENT
Start: 2022-12-27

## 2022-12-28 ENCOUNTER — OFFICE VISIT (OUTPATIENT)
Dept: WOUND CARE | Facility: HOSPITAL | Age: 72
End: 2022-12-28

## 2022-12-28 PROCEDURE — G0463 HOSPITAL OUTPT CLINIC VISIT: HCPCS

## 2023-01-11 ENCOUNTER — TELEPHONE (OUTPATIENT)
Dept: FAMILY MEDICINE CLINIC | Facility: CLINIC | Age: 73
End: 2023-01-11

## 2023-01-11 ENCOUNTER — OFFICE VISIT (OUTPATIENT)
Dept: WOUND CARE | Facility: HOSPITAL | Age: 73
End: 2023-01-11
Payer: MEDICARE

## 2023-01-11 DIAGNOSIS — L97.809: ICD-10-CM

## 2023-01-11 PROCEDURE — 99213 OFFICE O/P EST LOW 20 MIN: CPT

## 2023-01-11 PROCEDURE — G0463 HOSPITAL OUTPT CLINIC VISIT: HCPCS

## 2023-01-25 ENCOUNTER — OFFICE VISIT (OUTPATIENT)
Dept: WOUND CARE | Facility: HOSPITAL | Age: 73
End: 2023-01-25
Payer: MEDICARE

## 2023-01-25 ENCOUNTER — TELEPHONE (OUTPATIENT)
Dept: FAMILY MEDICINE CLINIC | Facility: CLINIC | Age: 73
End: 2023-01-25

## 2023-01-25 DIAGNOSIS — L97.809: ICD-10-CM

## 2023-01-25 PROCEDURE — 99213 OFFICE O/P EST LOW 20 MIN: CPT

## 2023-01-25 PROCEDURE — G0463 HOSPITAL OUTPT CLINIC VISIT: HCPCS

## 2023-01-25 NOTE — TELEPHONE ENCOUNTER
Caller: ROBERT    Relationship: [facility]ALK[facility]    Best call back number:     What is the best time to reach you:     Who are you requesting to speak with (clinical staff, provider,  specific staff member):     Do you know the name of the person who called:     What was the call regarding: ROBERT WITH [facility] IS CALLING IN WITH A RESULTS FOR THE PATIENT HE WANTS TO BE CALLED BACK TO DISCUSS. PATIENTS INR WAS 1.7 ON 01/24/23 AND THIS WAS FAXED TO DR DAILEY.     Do you require a callback: YES IF NEEDED

## 2023-02-08 ENCOUNTER — TELEPHONE (OUTPATIENT)
Dept: FAMILY MEDICINE CLINIC | Facility: CLINIC | Age: 73
End: 2023-02-08

## 2023-02-08 NOTE — TELEPHONE ENCOUNTER
Caller: CHRISTI    Relationship: ROBERT    Best call back number: [phone]*    What was the call regarding: ROBERT WITH [facility] CALLING WITH IRN RESULT FOR THE AFTERNOON OF 2/8/23. INR WAS 1.7.    Do you require a callback: NO

## 2023-02-15 ENCOUNTER — TELEPHONE (OUTPATIENT)
Dept: FAMILY MEDICINE CLINIC | Facility: CLINIC | Age: 73
End: 2023-02-15

## 2023-02-15 NOTE — TELEPHONE ENCOUNTER
ROBERT, WITH [facility], CALL TO REPORT A 2.4 INR, TAKEN ON 02/14/23.    CONTACT PATIENT IF ANY FOLLOW UP IS NEEDED.

## 2023-02-16 ENCOUNTER — OFFICE VISIT (OUTPATIENT)
Dept: WOUND CARE | Facility: HOSPITAL | Age: 73
End: 2023-02-16
Payer: MEDICARE

## 2023-02-16 DIAGNOSIS — L85.3 XEROSIS CUTIS: ICD-10-CM

## 2023-02-16 DIAGNOSIS — I87.2 VENOUS INSUFFICIENCY (CHRONIC) (PERIPHERAL): ICD-10-CM

## 2023-02-16 DIAGNOSIS — L97.809: ICD-10-CM

## 2023-02-16 PROCEDURE — 99213 OFFICE O/P EST LOW 20 MIN: CPT

## 2023-02-16 PROCEDURE — G0463 HOSPITAL OUTPT CLINIC VISIT: HCPCS

## 2023-02-17 DIAGNOSIS — I48.0 PAF (PAROXYSMAL ATRIAL FIBRILLATION): ICD-10-CM

## 2023-02-17 LAB
ALBUMIN SERPL-MCNC: 4.3 G/DL (ref 3.5–5.2)
ALBUMIN/GLOB SERPL: 1.9 G/DL
ALP SERPL-CCNC: 56 U/L (ref 39–117)
ALT SERPL-CCNC: 12 U/L (ref 1–33)
AST SERPL-CCNC: 13 U/L (ref 1–32)
BASOPHILS # BLD AUTO: 0.08 10*3/MM3 (ref 0–0.2)
BASOPHILS NFR BLD AUTO: 0.8 % (ref 0–1.5)
BILIRUB SERPL-MCNC: 0.3 MG/DL (ref 0–1.2)
BUN SERPL-MCNC: 24 MG/DL (ref 8–23)
BUN/CREAT SERPL: 15.3 (ref 7–25)
CALCIUM SERPL-MCNC: 9.6 MG/DL (ref 8.6–10.5)
CHLORIDE SERPL-SCNC: 102 MMOL/L (ref 98–107)
CHOLEST SERPL-MCNC: 249 MG/DL (ref 0–200)
CO2 SERPL-SCNC: 29 MMOL/L (ref 22–29)
CREAT SERPL-MCNC: 1.57 MG/DL (ref 0.57–1)
EGFRCR SERPLBLD CKD-EPI 2021: 34.5 ML/MIN/1.73
EOSINOPHIL # BLD AUTO: 0.31 10*3/MM3 (ref 0–0.4)
EOSINOPHIL NFR BLD AUTO: 3 % (ref 0.3–6.2)
ERYTHROCYTE [DISTWIDTH] IN BLOOD BY AUTOMATED COUNT: 15.2 % (ref 12.3–15.4)
GLOBULIN SER CALC-MCNC: 2.3 GM/DL
GLUCOSE SERPL-MCNC: 97 MG/DL (ref 65–99)
HCT VFR BLD AUTO: 36.2 % (ref 34–46.6)
HDLC SERPL-MCNC: 76 MG/DL (ref 40–60)
HGB BLD-MCNC: 11.5 G/DL (ref 12–15.9)
IMM GRANULOCYTES # BLD AUTO: 0.16 10*3/MM3 (ref 0–0.05)
IMM GRANULOCYTES NFR BLD AUTO: 1.6 % (ref 0–0.5)
LDLC SERPL CALC-MCNC: 137 MG/DL (ref 0–100)
LDLC/HDLC SERPL: 1.73 {RATIO}
LYMPHOCYTES # BLD AUTO: 1.69 10*3/MM3 (ref 0.7–3.1)
LYMPHOCYTES NFR BLD AUTO: 16.6 % (ref 19.6–45.3)
MCH RBC QN AUTO: 28.2 PG (ref 26.6–33)
MCHC RBC AUTO-ENTMCNC: 31.8 G/DL (ref 31.5–35.7)
MCV RBC AUTO: 88.7 FL (ref 79–97)
MONOCYTES # BLD AUTO: 0.77 10*3/MM3 (ref 0.1–0.9)
MONOCYTES NFR BLD AUTO: 7.6 % (ref 5–12)
NEUTROPHILS # BLD AUTO: 7.17 10*3/MM3 (ref 1.7–7)
NEUTROPHILS NFR BLD AUTO: 70.4 % (ref 42.7–76)
NRBC BLD AUTO-RTO: 0.1 /100 WBC (ref 0–0.2)
PLATELET # BLD AUTO: 162 10*3/MM3 (ref 140–450)
POTASSIUM SERPL-SCNC: 4.7 MMOL/L (ref 3.5–5.2)
PROT SERPL-MCNC: 6.6 G/DL (ref 6–8.5)
RBC # BLD AUTO: 4.08 10*6/MM3 (ref 3.77–5.28)
SODIUM SERPL-SCNC: 139 MMOL/L (ref 136–145)
T4 FREE SERPL-MCNC: 1.18 NG/DL (ref 0.93–1.7)
TRIGL SERPL-MCNC: 206 MG/DL (ref 0–150)
TSH SERPL DL<=0.005 MIU/L-ACNC: 4.16 UIU/ML (ref 0.27–4.2)
VLDLC SERPL CALC-MCNC: 36 MG/DL (ref 5–40)
WBC # BLD AUTO: 10.18 10*3/MM3 (ref 3.4–10.8)

## 2023-02-20 RX ORDER — WARFARIN SODIUM 4 MG/1
TABLET ORAL
Qty: 90 TABLET | Refills: 3 | Status: SHIPPED | OUTPATIENT
Start: 2023-02-20

## 2023-02-21 ENCOUNTER — OFFICE VISIT (OUTPATIENT)
Dept: FAMILY MEDICINE CLINIC | Facility: CLINIC | Age: 73
End: 2023-02-21
Payer: MEDICARE

## 2023-02-21 VITALS
OXYGEN SATURATION: 95 % | BODY MASS INDEX: 54.86 KG/M2 | DIASTOLIC BLOOD PRESSURE: 80 MMHG | RESPIRATION RATE: 17 BRPM | HEART RATE: 84 BPM | SYSTOLIC BLOOD PRESSURE: 126 MMHG | TEMPERATURE: 98.9 F | HEIGHT: 62 IN

## 2023-02-21 DIAGNOSIS — D64.9 ANEMIA, UNSPECIFIED TYPE: ICD-10-CM

## 2023-02-21 DIAGNOSIS — I99.8 ISCHEMIA OF TOE: ICD-10-CM

## 2023-02-21 DIAGNOSIS — E66.01 MORBID OBESITY: ICD-10-CM

## 2023-02-21 DIAGNOSIS — N18.30 CHRONIC RENAL IMPAIRMENT, STAGE 3 (MODERATE), UNSPECIFIED WHETHER STAGE 3A OR 3B CKD: ICD-10-CM

## 2023-02-21 DIAGNOSIS — I48.0 PAF (PAROXYSMAL ATRIAL FIBRILLATION): ICD-10-CM

## 2023-02-21 DIAGNOSIS — K21.9 GASTROESOPHAGEAL REFLUX DISEASE, UNSPECIFIED WHETHER ESOPHAGITIS PRESENT: ICD-10-CM

## 2023-02-21 DIAGNOSIS — Z78.0 POST-MENOPAUSAL: ICD-10-CM

## 2023-02-21 DIAGNOSIS — I10 BENIGN ESSENTIAL HYPERTENSION: Primary | ICD-10-CM

## 2023-02-21 DIAGNOSIS — R60.0 EDEMA OF LEG: ICD-10-CM

## 2023-02-21 DIAGNOSIS — E03.9 ACQUIRED HYPOTHYROIDISM: ICD-10-CM

## 2023-02-21 PROCEDURE — 99214 OFFICE O/P EST MOD 30 MIN: CPT | Performed by: INTERNAL MEDICINE

## 2023-02-21 RX ORDER — ATORVASTATIN CALCIUM 10 MG/1
10 TABLET, FILM COATED ORAL DAILY
Qty: 90 TABLET | Refills: 3 | Status: SHIPPED | OUTPATIENT
Start: 2023-02-21

## 2023-02-21 NOTE — PROGRESS NOTES
Subjective   Alison Colvin is a 72 y.o. female. Patient is here today for follow-up on her hypertension, hypothyroidism, obesity, GERD, chronic kidney disease stage III.  Patient has been having a darkened right big toenail and some color changes in at least the first and second toes of the right foot.  Otherwise she has been relatively stable in a wheelchair.  She has been going to the wound clinic and her wound is healed.  Chief Complaint   Patient presents with   • Hypertension          Vitals:    02/21/23 1347   BP: 126/80   Pulse: 84   Resp: 17   Temp: 98.9 °F (37.2 °C)   SpO2: 95%     Body mass index is 54.86 kg/m².  The following portions of the patient's history were reviewed and updated as appropriate: allergies, current medications, past family history, past medical history, past social history, past surgical history and problem list.    Past Medical History:   Diagnosis Date   • Abnormal nuclear stress test    • Acute sinusitis    • Allergy    • Benign essential hypertension    • Cellulitis    • Chest pain    • Colon cancer ([facility])    • COPD (chronic obstructive pulmonary disease) ([facility])    • Disease of thyroid gland    • Edema of leg    • Epilepsy (HCC)    • Esophageal reflux    • Foot pain    • Myalgia and myositis    • Onychomycosis of toenail    • Transient cerebral ischemia    • URI (upper respiratory infection)       Allergies   Allergen Reactions   • Pneumococcal Vaccines Delirium     Fever, chills   • Asa [Aspirin]    • Levofloxacin    • Meperidine Other (See Comments)   • Naproxen    • Pregabalin Other (See Comments)   • Propoxyphene    • Sulfa Antibiotics Other (See Comments)      Social History     Socioeconomic History   • Marital status:    Tobacco Use   • Smoking status: Never   • Smokeless tobacco: Never   Vaping Use   • Vaping Use: Never used   Substance and Sexual Activity   • Alcohol use: No   • Drug use: No   • Sexual activity: Defer        Current Outpatient Medications:   •   allopurinol (ZYLOPRIM) 100 MG tablet, TAKE 1 TABLET BY MOUTH  DAILY, Disp: 90 tablet, Rfl: 3  •  baclofen (LIORESAL) 10 MG tablet, TAKE 1 TABLET BY MOUTH  TWICE DAILY, Disp: 180 tablet, Rfl: 1  •  BROVANA 15 MCG/2ML nebulizer solution, USE 1 VIAL PER NEBULIZER BID, Disp: , Rfl: 5  •  budesonide (PULMICORT) 1 MG/2ML nebulizer solution, Pulmicort SUSP; Patient Sig: Pulmicort SUSP USE 1 UNIT DOSE VIA NEBULIZER TWICE DAILY; 0; 25-Mar-2013; Active, Disp: , Rfl:   •  calcium carbonate (OS-SEBASTIÁN) 600 MG tablet, Take 600 mg by mouth 2 (two) times a day with meals., Disp: , Rfl:   •  DULoxetine (CYMBALTA) 60 MG capsule, TAKE 1 CAPSULE BY MOUTH  DAILY, Disp: 90 capsule, Rfl: 3  •  furosemide (LASIX) 20 MG tablet, TAKE 1 TABLET BY MOUTH  DAILY, Disp: 90 tablet, Rfl: 3  •  HYDROcodone-acetaminophen (NORCO)  MG per tablet, TK 1 T PO Q 8 H PRF PAIN, Disp: , Rfl: 0  •  ipratropium-albuterol (DUO-NEB) 0.5-2.5 mg/mL nebulizer, USE 1 VIAL PER NEBULIZER QID, Disp: , Rfl: 5  •  levothyroxine (SYNTHROID, LEVOTHROID) 50 MCG tablet, TAKE 1 TABLET BY MOUTH  DAILY, Disp: 90 tablet, Rfl: 3  •  metoclopramide (REGLAN) 10 MG tablet, Take 1 tablet by mouth 4 (Four) Times a Day., Disp: 360 tablet, Rfl: 3  •  Multiple Vitamins-Minerals (WOMENS 50+ MULTI VITAMIN/MIN PO), Take 1 tablet by mouth daily., Disp: , Rfl:   •  nystatin (MYCOSTATIN) 102387 UNIT/GM powder, Apply  topically to the appropriate area as directed Every 12 (Twelve) Hours., Disp: 15 g, Rfl: 0  •  pantoprazole (PROTONIX) 40 MG EC tablet, TAKE 1 TABLET BY MOUTH  TWICE DAILY, Disp: 180 tablet, Rfl: 3  •  potassium chloride (K-DUR,KLOR-CON) 20 MEQ CR tablet, TAKE 1 TABLET BY MOUTH  TWICE DAILY, Disp: 180 tablet, Rfl: 3  •  predniSONE (DELTASONE) 5 MG tablet, TAKE 1 TABLET BY MOUTH  DAILY, Disp: 90 tablet, Rfl: 1  •  rOPINIRole (Requip) 0.25 MG tablet, Take 1 tablet by mouth Every Night. Take 1 hour before bedtime., Disp: 90 tablet, Rfl: 0  •  tiZANidine (ZANAFLEX) 4 MG tablet,  TAKE 1 TABLET BY MOUTH  TWICE DAILY, Disp: 180 tablet, Rfl: 1  •  topiramate (TOPAMAX) 25 MG tablet, Take 1 tablet by mouth 2 (Two) Times a Day., Disp: 180 tablet, Rfl: 3  •  warfarin (Coumadin) 1 MG tablet, Take 1 tablet twice a week in addition to her 4 mg daily, Disp: 30 tablet, Rfl: 5  •  warfarin (COUMADIN) 4 MG tablet, TAKE 1 TABLET BY MOUTH AT  NIGHT AS DIRECTED, Disp: 90 tablet, Rfl: 3  •  amoxicillin-clavulanate (Augmentin) 875-125 MG per tablet, Take 1 tablet by mouth 2 (Two) Times a Day., Disp: 20 tablet, Rfl: 1  •  atorvastatin (LIPITOR) 10 MG tablet, Take 1 tablet by mouth Daily., Disp: 90 tablet, Rfl: 3  •  OXYGEN-HELIUM IN, Inhale., Disp: , Rfl:      Objective     History of Present Illness     Review of Systems    Physical Exam  Vitals and nursing note reviewed.   Constitutional:       General: She is not in acute distress.     Appearance: Normal appearance. She is obese. She is not ill-appearing.   HENT:      Head: Normocephalic and atraumatic.   Cardiovascular:      Rate and Rhythm: Normal rate and regular rhythm.      Heart sounds: Normal heart sounds.   Pulmonary:      Effort: Pulmonary effort is normal. No respiratory distress.      Breath sounds: Normal breath sounds. No wheezing or rales.   Skin:     General: Skin is warm and dry.      Comments: The right first and second toes are somewhat reddened and bluish with a black right first toenail.  It certainly is worrisome for some type of circulatory problem   Neurological:      General: No focal deficit present.      Mental Status: She is alert and oriented to person, place, and time.   Psychiatric:         Mood and Affect: Mood normal.         Behavior: Behavior normal.         ASSESSMENT CBC was normal aside from a hemoglobin of 11.5.  CMP had a sugar of 97, creatinine 1.57 and other values normal.  Lipid panel has a total cholesterol 249, HDL 76 and .  TSH and free T4 were normal on supplement  #1-hypertension controlled on  medication  #2-history of paroxysmal atrial fibrillation  #3-hypothyroidism, euthyroid on replacement  #4-obesity with no significant weight loss  #5-history of chronic kidney disease stage III with normal creatinine today  #6-skin changes and toenail change on the right foot, worrisome for circulatory problem  #7-history of osteopenia       Problems Addressed this Visit        Cardiac and Vasculature    Benign essential hypertension - Primary    PAF (paroxysmal atrial fibrillation) ([facility])       Endocrine and Metabolic    Hypothyroidism    Morbid obesity ([facility])       Gastrointestinal Abdominal     Gastroesophageal reflux disease       Genitourinary and Reproductive     Chronic renal impairment, stage 3 (moderate) ([facility])       Hematology and Neoplasia    Anemia       Symptoms and Signs    Edema of leg   Other Visit Diagnoses     Ischemia of toe        Relevant Orders    Ambulatory Referral to Vascular Surgery    Post-menopausal        Relevant Orders    DEXA Bone Density Axial      Diagnoses       Codes Comments    Benign essential hypertension    -  Primary ICD-10-CM: I10  ICD-9-CM: 401.1     PAF (paroxysmal atrial fibrillation) ([facility])     ICD-10-CM: I48.0  ICD-9-CM: 427.31     Acquired hypothyroidism     ICD-10-CM: E03.9  ICD-9-CM: 244.9     Morbid obesity ([facility])     ICD-10-CM: E66.01  ICD-9-CM: 278.01     Gastroesophageal reflux disease, unspecified whether esophagitis present     ICD-10-CM: K21.9  ICD-9-CM: 530.81     Chronic renal impairment, stage 3 (moderate), unspecified whether stage 3a or 3b CKD ([facility])     ICD-10-CM: N18.30  ICD-9-CM: 585.3     Anemia, unspecified type     ICD-10-CM: D64.9  ICD-9-CM: 285.9     Edema of leg     ICD-10-CM: R60.0  ICD-9-CM: 782.3     Ischemia of toe     ICD-10-CM: I99.8  ICD-9-CM: 459.9     Post-menopausal     ICD-10-CM: Z78.0  ICD-9-CM: V49.81           PLAN the patient will be due a bone density test in May so I ordered it.  I am referring her to vascular surgery for evaluation of  possible arterial compromise in the right foot.  I am starting her on atorvastatin 10 mg daily for her lipids and she will continue other medicines as now.  She does protimes weekly at home.  I plan on rechecking her in 6 months with a CBC, CMP, lipid panel, TSH and free T4 and vitamin D level    There are no Patient Instructions on file for this visit.  No follow-ups on file.

## 2023-03-02 ENCOUNTER — APPOINTMENT (OUTPATIENT)
Dept: VASCULAR SURGERY | Facility: HOSPITAL | Age: 73
End: 2023-03-02
Payer: MEDICARE

## 2023-03-02 PROCEDURE — G0463 HOSPITAL OUTPT CLINIC VISIT: HCPCS

## 2023-04-14 ENCOUNTER — TELEPHONE (OUTPATIENT)
Dept: FAMILY MEDICINE CLINIC | Facility: CLINIC | Age: 73
End: 2023-04-14
Payer: MEDICARE

## 2023-04-26 ENCOUNTER — TELEPHONE (OUTPATIENT)
Dept: FAMILY MEDICINE CLINIC | Facility: CLINIC | Age: 73
End: 2023-04-26

## 2023-04-26 NOTE — TELEPHONE ENCOUNTER
Caller: ABHIJEET    Relationship: MD INR    Best call back number: [phone]    What was the call regarding: ABHIJEET WITH [facility] IS CALLING TO REPORT AN OUT-OF-RANGE INR. ABHIJEET STATED THAT IT WAS TAKEN YESTERDAY 04/25/2023 AND IS 1.8.

## 2023-04-26 NOTE — TELEPHONE ENCOUNTER
Please see below. Patient is currently taking 4mg daily. She said her machine broke and she had to go a couple of weeks without checking it, so the time before this it got up to 3.6.

## 2023-05-10 ENCOUNTER — TELEPHONE (OUTPATIENT)
Dept: FAMILY MEDICINE CLINIC | Facility: CLINIC | Age: 73
End: 2023-05-10

## 2023-05-10 NOTE — TELEPHONE ENCOUNTER
Caller: ARPAN- MD INR    Relationship: Lab    Best call back number: [phone]    What was the call regarding: ARPAN WITH [facility] IS CALLING IN AN OUT OF RANGE INR, WHICH IS 1.9, AND WAS TAKEN YESTERDAY. ARPAN STATED PATIENT IS TESTING AT HOME SO INSTRUCTIONS SHOULD BE CALLED IN DIRECTLY TO PATIENT.

## 2023-05-15 RX ORDER — TOPIRAMATE 25 MG/1
TABLET ORAL
Qty: 180 TABLET | Refills: 3 | Status: SHIPPED | OUTPATIENT
Start: 2023-05-15

## 2023-05-16 RX ORDER — METOCLOPRAMIDE 10 MG/1
TABLET ORAL
Qty: 360 TABLET | Refills: 3 | Status: SHIPPED | OUTPATIENT
Start: 2023-05-16

## 2023-05-16 RX ORDER — BACLOFEN 10 MG/1
TABLET ORAL
Qty: 180 TABLET | Refills: 1 | Status: SHIPPED | OUTPATIENT
Start: 2023-05-16

## 2023-05-16 RX ORDER — TIZANIDINE 4 MG/1
TABLET ORAL
Qty: 180 TABLET | Refills: 1 | Status: SHIPPED | OUTPATIENT
Start: 2023-05-16

## 2023-05-16 RX ORDER — PREDNISONE 5 MG/1
5 TABLET ORAL DAILY
Qty: 90 TABLET | Refills: 1 | Status: SHIPPED | OUTPATIENT
Start: 2023-05-16

## 2023-06-05 ENCOUNTER — TELEPHONE (OUTPATIENT)
Dept: FAMILY MEDICINE CLINIC | Facility: CLINIC | Age: 73
End: 2023-06-05

## 2023-06-05 NOTE — TELEPHONE ENCOUNTER
Caller: JULIANA - MD INR    Relationship:     Best call back number: [phone]    Who are you requesting to speak with (clinical staff, provider,  specific staff member): CLINICAL    What was the call regarding: JULIANA WITH MD [facility] STATES THE PATIENTS INR AS OF FRIDAY, 06/02/2023, WAS AT 1.8.

## 2023-06-06 NOTE — TELEPHONE ENCOUNTER
Pt's INR was at 1.8 as of last Friday. Please advise if you would like pt to come in or change warfarin dose. Thank you!

## 2023-07-26 ENCOUNTER — TELEPHONE (OUTPATIENT)
Dept: FAMILY MEDICINE CLINIC | Facility: CLINIC | Age: 73
End: 2023-07-26
Payer: MEDICARE

## 2023-08-07 RX ORDER — POTASSIUM CHLORIDE 20 MEQ/1
TABLET, EXTENDED RELEASE ORAL
Qty: 180 TABLET | Refills: 0 | Status: SHIPPED | OUTPATIENT
Start: 2023-08-07

## 2023-08-16 ENCOUNTER — TELEPHONE (OUTPATIENT)
Dept: FAMILY MEDICINE CLINIC | Facility: CLINIC | Age: 73
End: 2023-08-16
Payer: MEDICARE

## 2023-08-16 NOTE — TELEPHONE ENCOUNTER
Clarita with [facility] reporting out of range result      INR 1.6 taken today    Patient test at home and will need for me to call pt with your instructions, thanks

## 2023-08-16 NOTE — TELEPHONE ENCOUNTER
She says dentist had her stop med for 3 days. She was informed to continue same dose and recheck in one week.

## 2023-08-18 LAB
ALBUMIN SERPL-MCNC: 4 G/DL (ref 3.5–5.2)
ALBUMIN/GLOB SERPL: 1.8 G/DL
ALP SERPL-CCNC: 65 U/L (ref 39–117)
ALT SERPL-CCNC: 12 U/L (ref 1–33)
AST SERPL-CCNC: 16 U/L (ref 1–32)
BASOPHILS # BLD AUTO: 0.09 10*3/MM3 (ref 0–0.2)
BASOPHILS NFR BLD AUTO: 1.1 % (ref 0–1.5)
BILIRUB SERPL-MCNC: 0.3 MG/DL (ref 0–1.2)
BUN SERPL-MCNC: 15 MG/DL (ref 8–23)
BUN/CREAT SERPL: 9.9 (ref 7–25)
CALCIUM SERPL-MCNC: 9.6 MG/DL (ref 8.6–10.5)
CHLORIDE SERPL-SCNC: 103 MMOL/L (ref 98–107)
CHOLEST SERPL-MCNC: 213 MG/DL (ref 0–200)
CO2 SERPL-SCNC: 26.1 MMOL/L (ref 22–29)
CREAT SERPL-MCNC: 1.51 MG/DL (ref 0.57–1)
EGFRCR SERPLBLD CKD-EPI 2021: 36.1 ML/MIN/1.73
EOSINOPHIL # BLD AUTO: 0.22 10*3/MM3 (ref 0–0.4)
EOSINOPHIL NFR BLD AUTO: 2.6 % (ref 0.3–6.2)
ERYTHROCYTE [DISTWIDTH] IN BLOOD BY AUTOMATED COUNT: 15.3 % (ref 12.3–15.4)
GLOBULIN SER CALC-MCNC: 2.2 GM/DL
GLUCOSE SERPL-MCNC: 107 MG/DL (ref 65–99)
HCT VFR BLD AUTO: 33.3 % (ref 34–46.6)
HDLC SERPL-MCNC: 70 MG/DL (ref 40–60)
HGB BLD-MCNC: 10.8 G/DL (ref 12–15.9)
IMM GRANULOCYTES # BLD AUTO: 0.17 10*3/MM3 (ref 0–0.05)
IMM GRANULOCYTES NFR BLD AUTO: 2 % (ref 0–0.5)
LDLC SERPL CALC-MCNC: 111 MG/DL (ref 0–100)
LDLC/HDLC SERPL: 1.51 {RATIO}
LYMPHOCYTES # BLD AUTO: 2.3 10*3/MM3 (ref 0.7–3.1)
LYMPHOCYTES NFR BLD AUTO: 27.1 % (ref 19.6–45.3)
MCH RBC QN AUTO: 27.9 PG (ref 26.6–33)
MCHC RBC AUTO-ENTMCNC: 32.4 G/DL (ref 31.5–35.7)
MCV RBC AUTO: 86 FL (ref 79–97)
MONOCYTES # BLD AUTO: 0.85 10*3/MM3 (ref 0.1–0.9)
MONOCYTES NFR BLD AUTO: 10 % (ref 5–12)
NEUTROPHILS # BLD AUTO: 4.86 10*3/MM3 (ref 1.7–7)
NEUTROPHILS NFR BLD AUTO: 57.2 % (ref 42.7–76)
NRBC BLD AUTO-RTO: 0.2 /100 WBC (ref 0–0.2)
PLATELET # BLD AUTO: 177 10*3/MM3 (ref 140–450)
POTASSIUM SERPL-SCNC: 4.5 MMOL/L (ref 3.5–5.2)
PROT SERPL-MCNC: 6.2 G/DL (ref 6–8.5)
RBC # BLD AUTO: 3.87 10*6/MM3 (ref 3.77–5.28)
SODIUM SERPL-SCNC: 139 MMOL/L (ref 136–145)
T4 FREE SERPL-MCNC: 1.05 NG/DL (ref 0.93–1.7)
TRIGL SERPL-MCNC: 187 MG/DL (ref 0–150)
TSH SERPL DL<=0.005 MIU/L-ACNC: 3.99 UIU/ML (ref 0.27–4.2)
VLDLC SERPL CALC-MCNC: 32 MG/DL (ref 5–40)
WBC # BLD AUTO: 8.49 10*3/MM3 (ref 3.4–10.8)

## 2023-08-23 ENCOUNTER — TELEPHONE (OUTPATIENT)
Dept: FAMILY MEDICINE CLINIC | Facility: CLINIC | Age: 73
End: 2023-08-23

## 2023-08-23 ENCOUNTER — OFFICE VISIT (OUTPATIENT)
Dept: FAMILY MEDICINE CLINIC | Facility: CLINIC | Age: 73
End: 2023-08-23
Payer: MEDICARE

## 2023-08-23 VITALS
SYSTOLIC BLOOD PRESSURE: 118 MMHG | RESPIRATION RATE: 16 BRPM | BODY MASS INDEX: 54.86 KG/M2 | HEART RATE: 71 BPM | OXYGEN SATURATION: 98 % | TEMPERATURE: 97.7 F | HEIGHT: 62 IN | DIASTOLIC BLOOD PRESSURE: 76 MMHG

## 2023-08-23 DIAGNOSIS — I87.2 CHRONIC VENOUS INSUFFICIENCY: ICD-10-CM

## 2023-08-23 DIAGNOSIS — E78.5 HYPERLIPIDEMIA, UNSPECIFIED HYPERLIPIDEMIA TYPE: ICD-10-CM

## 2023-08-23 DIAGNOSIS — Z95.828 HISTORY OF INFERIOR VENA CAVAL FILTER PLACEMENT: ICD-10-CM

## 2023-08-23 DIAGNOSIS — M81.0 AGE-RELATED OSTEOPOROSIS WITHOUT CURRENT PATHOLOGICAL FRACTURE: ICD-10-CM

## 2023-08-23 DIAGNOSIS — Z12.31 VISIT FOR SCREENING MAMMOGRAM: ICD-10-CM

## 2023-08-23 DIAGNOSIS — I48.0 PAF (PAROXYSMAL ATRIAL FIBRILLATION): ICD-10-CM

## 2023-08-23 DIAGNOSIS — Z90.49 S/P PARTIAL COLECTOMY: ICD-10-CM

## 2023-08-23 DIAGNOSIS — E03.9 ACQUIRED HYPOTHYROIDISM: ICD-10-CM

## 2023-08-23 DIAGNOSIS — I82.220 INFERIOR VENA CAVA OCCLUSION: ICD-10-CM

## 2023-08-23 DIAGNOSIS — I10 BENIGN ESSENTIAL HYPERTENSION: Primary | ICD-10-CM

## 2023-08-23 DIAGNOSIS — N18.30 CHRONIC RENAL IMPAIRMENT, STAGE 3 (MODERATE), UNSPECIFIED WHETHER STAGE 3A OR 3B CKD: ICD-10-CM

## 2023-08-23 DIAGNOSIS — M79.7 FIBROMYALGIA: ICD-10-CM

## 2023-08-23 DIAGNOSIS — I10 PRIMARY HYPERTENSION: ICD-10-CM

## 2023-08-23 DIAGNOSIS — E66.01 MORBID OBESITY: ICD-10-CM

## 2023-08-23 RX ORDER — ATORVASTATIN CALCIUM 10 MG/1
10 TABLET, FILM COATED ORAL DAILY
Qty: 90 TABLET | Refills: 3 | Status: SHIPPED | OUTPATIENT
Start: 2023-08-23

## 2023-08-23 NOTE — PROGRESS NOTES
Subjective   Alison Colvin is a 72 y.o. female. Patient is here today for follow-up on her hypertension, history of paroxysmal atrial fibrillation, vena caval occlusion, PE and vena caval filter placement.  She also has obesity, hypothyroidism and chronic kidney disease stage III.  She is on chronic Coumadin therapy, 4 mg daily and has had no unusual bruising or bleeding.  Chief Complaint   Patient presents with    Hypertension          Vitals:    08/23/23 0857   BP: 118/76   Pulse: 71   Resp: 16   Temp: 97.7 øF (36.5 øC)   SpO2: 98%     Body mass index is 54.86 kg/mý.  The following portions of the patient's history were reviewed and updated as appropriate: allergies, current medications, past family history, past medical history, past social history, past surgical history and problem list.    Past Medical History:   Diagnosis Date    Abnormal nuclear stress test     Acute sinusitis     Allergy     Benign essential hypertension     Cellulitis     Chest pain     Colon cancer     COPD (chronic obstructive pulmonary disease)     Disease of thyroid gland     Edema of leg     Epilepsy     Esophageal reflux     Foot pain     Myalgia and myositis     Onychomycosis of toenail     Transient cerebral ischemia     URI (upper respiratory infection)       Allergies   Allergen Reactions    Pneumococcal Vaccines Delirium     Fever, chills    Asa [Aspirin]     Levofloxacin     Meperidine Other (See Comments)    Naproxen     Pregabalin Other (See Comments)    Propoxyphene     Sulfa Antibiotics Other (See Comments)      Social History     Socioeconomic History    Marital status:    Tobacco Use    Smoking status: Never    Smokeless tobacco: Never   Vaping Use    Vaping Use: Never used   Substance and Sexual Activity    Alcohol use: No    Drug use: No    Sexual activity: Defer        Current Outpatient Medications:     allopurinol (ZYLOPRIM) 100 MG tablet, TAKE 1 TABLET BY MOUTH  DAILY, Disp: 90 tablet, Rfl: 3    atorvastatin  (LIPITOR) 10 MG tablet, Take 1 tablet by mouth Daily., Disp: 90 tablet, Rfl: 3    baclofen (LIORESAL) 10 MG tablet, TAKE 1 TABLET BY MOUTH  TWICE DAILY, Disp: 180 tablet, Rfl: 1    BROVANA 15 MCG/2ML nebulizer solution, USE 1 VIAL PER NEBULIZER BID, Disp: , Rfl: 5    budesonide (PULMICORT) 1 MG/2ML nebulizer solution, Pulmicort SUSP; Patient Sig: Pulmicort SUSP USE 1 UNIT DOSE VIA NEBULIZER TWICE DAILY; 0; 25-Mar-2013; Active, Disp: , Rfl:     calcium carbonate (OS-SEBASTIÁN) 600 MG tablet, Take 1 tablet by mouth 2 (Two) Times a Day With Meals., Disp: , Rfl:     DULoxetine (CYMBALTA) 60 MG capsule, TAKE 1 CAPSULE BY MOUTH  DAILY, Disp: 90 capsule, Rfl: 0    furosemide (LASIX) 20 MG tablet, TAKE 1 TABLET BY MOUTH  DAILY, Disp: 90 tablet, Rfl: 0    HYDROcodone-acetaminophen (NORCO)  MG per tablet, TK 1 T PO Q 8 H PRF PAIN, Disp: , Rfl: 0    ipratropium-albuterol (DUO-NEB) 0.5-2.5 mg/mL nebulizer, USE 1 VIAL PER NEBULIZER QID, Disp: , Rfl: 5    levothyroxine (SYNTHROID, LEVOTHROID) 50 MCG tablet, TAKE 1 TABLET BY MOUTH  DAILY, Disp: 90 tablet, Rfl: 0    metoclopramide (REGLAN) 10 MG tablet, TAKE 1 TABLET BY MOUTH 4  TIMES DAILY, Disp: 360 tablet, Rfl: 3    Multiple Vitamins-Minerals (WOMENS 50+ MULTI VITAMIN/MIN PO), Take 1 tablet by mouth Daily., Disp: , Rfl:     nystatin (MYCOSTATIN) 577183 UNIT/GM powder, Apply  topically to the appropriate area as directed Every 12 (Twelve) Hours., Disp: 15 g, Rfl: 0    OXYGEN-HELIUM IN, Inhale., Disp: , Rfl:     pantoprazole (PROTONIX) 40 MG EC tablet, TAKE 1 TABLET BY MOUTH  TWICE DAILY, Disp: 180 tablet, Rfl: 3    potassium chloride (K-DUR,KLOR-CON) 20 MEQ CR tablet, TAKE 1 TABLET BY MOUTH  TWICE DAILY, Disp: 180 tablet, Rfl: 0    predniSONE (DELTASONE) 5 MG tablet, TAKE 1 TABLET BY MOUTH DAILY, Disp: 90 tablet, Rfl: 1    rOPINIRole (REQUIP) 0.25 MG tablet, TAKE 1 TABLET BY MOUTH EVERY  NIGHT ONE HOUR BEFORE BEDTIME, Disp: 90 tablet, Rfl: 0    tiZANidine (ZANAFLEX) 4 MG tablet,  TAKE 1 TABLET BY MOUTH TWICE  DAILY, Disp: 180 tablet, Rfl: 1    topiramate (TOPAMAX) 25 MG tablet, TAKE 1 TABLET BY MOUTH  TWICE DAILY, Disp: 180 tablet, Rfl: 3    warfarin (Coumadin) 1 MG tablet, Take 1 tablet twice a week in addition to her 4 mg daily, Disp: 30 tablet, Rfl: 5    warfarin (COUMADIN) 4 MG tablet, TAKE 1 TABLET BY MOUTH AT  NIGHT AS DIRECTED, Disp: 90 tablet, Rfl: 3    amoxicillin-clavulanate (Augmentin) 875-125 MG per tablet, Take 1 tablet by mouth 2 (Two) Times a Day. (Patient not taking: Reported on 8/23/2023), Disp: 20 tablet, Rfl: 1     Objective     History of Present Illness     Review of Systems    Physical Exam  Vitals and nursing note reviewed.   Constitutional:       General: She is not in acute distress.     Appearance: Normal appearance. She is obese. She is not ill-appearing.      Comments: Patient is in a wheelchair   HENT:      Head: Normocephalic and atraumatic.   Cardiovascular:      Rate and Rhythm: Normal rate and regular rhythm.      Heart sounds: Normal heart sounds.   Pulmonary:      Effort: Pulmonary effort is normal. No respiratory distress.      Breath sounds: Normal breath sounds. No wheezing or rales.   Skin:     General: Skin is warm and dry.   Neurological:      General: No focal deficit present.      Mental Status: She is alert and oriented to person, place, and time.   Psychiatric:         Mood and Affect: Mood normal.         Behavior: Behavior normal.       Assessment    ASSESSMENT CBC was just minimally low with a normal RBC count, hemoglobin 10.8 and hematocrit 33.3.  CMP has a sugar of 107, stable creatinine of 1.51 and other values normal.  TSH and free T4 were both normal and lipid panel has total cholesterol 213, HDL 70   #1-hypertension controlled on medication  #2-history of paroxysmal atrial fibrillation, vena caval occlusion, status post filter and history of PE, on chronic Coumadin therapy  #3-hyperlipidemia  #4-hypothyroidism, euthyroid on  replacement  #5-chronic kidney disease stage III, asymptomatic and stable  #6-mild borderline anemia        Problems Addressed this Visit          Cardiac and Vasculature    Benign essential hypertension - Primary    Relevant Orders    Comprehensive Metabolic Panel (Completed)    Chronic venous insufficiency    PAF (paroxysmal atrial fibrillation)    Inferior vena cava occlusion    Hypertension       Coag and Thromboembolic    History of inferior vena caval filter placement       Endocrine and Metabolic    Hypothyroidism    Relevant Orders    CBC & Differential (Completed)    TSH+Free T4 (Completed)    Morbid obesity       Gastrointestinal Abdominal     S/P partial colectomy       Genitourinary and Reproductive     Chronic renal impairment, stage 3 (moderate)       Musculoskeletal and Injuries    Fibromyalgia    Age-related osteoporosis without current pathological fracture    Relevant Orders    DEXA Bone Density Axial     Other Visit Diagnoses       Hyperlipidemia, unspecified hyperlipidemia type        Relevant Medications    atorvastatin (LIPITOR) 10 MG tablet    Other Relevant Orders    Lipid Panel With LDL / HDL Ratio (Completed)    Visit for screening mammogram        Relevant Orders    Mammo Screening Bilateral With CAD          Diagnoses         Codes Comments    Benign essential hypertension    -  Primary ICD-10-CM: I10  ICD-9-CM: 401.1     Acquired hypothyroidism     ICD-10-CM: E03.9  ICD-9-CM: 244.9     Hyperlipidemia, unspecified hyperlipidemia type     ICD-10-CM: E78.5  ICD-9-CM: 272.4     PAF (paroxysmal atrial fibrillation)     ICD-10-CM: I48.0  ICD-9-CM: 427.31     Inferior vena cava occlusion     ICD-10-CM: I82.220  ICD-9-CM: 453.2     Primary hypertension     ICD-10-CM: I10  ICD-9-CM: 401.9     Chronic venous insufficiency     ICD-10-CM: I87.2  ICD-9-CM: 459.81     History of inferior vena caval filter placement     ICD-10-CM: Z95.828  ICD-9-CM: V43.4     Morbid obesity     ICD-10-CM:  E66.01  ICD-9-CM: 278.01     S/P partial colectomy     ICD-10-CM: Z90.49  ICD-9-CM: V45.89     Chronic renal impairment, stage 3 (moderate), unspecified whether stage 3a or 3b CKD     ICD-10-CM: N18.30  ICD-9-CM: 585.3     Fibromyalgia     ICD-10-CM: M79.7  ICD-9-CM: 729.1     Visit for screening mammogram     ICD-10-CM: Z12.31  ICD-9-CM: V76.12     Age-related osteoporosis without current pathological fracture     ICD-10-CM: M81.0  ICD-9-CM: 733.01             PLAN the patient will continue current medicines as now.  She monitors her pro time weekly at home.  I did recommend flu, RSV and COVID-19 vaccinations for the patient.  I ordered mammograms and bone density test on the patient and she can be rechecked in 6 months with a CBC, CMP, lipid panel, TSH and free T4, serum iron and TIBC, vitamin B12 and folic acid    There are no Patient Instructions on file for this visit.  Return in about 6 months (around 2/23/2024) for with labs.

## 2023-08-23 NOTE — TELEPHONE ENCOUNTER
Caller: CHRISTI    Relationship to patient: Other    Best call back number: [phone]    Patient is needing: CHRISTI WOULD LIKE TO REPORT AN OUT-OF-RANGE RESULT FOR THE PATIENT. THE PATIENT'S INR WAS 1.7 AS OF TODAY, 08/23. THE PATIENT HAS BEEN TESTING AT HOME.

## 2023-08-29 ENCOUNTER — TRANSCRIBE ORDERS (OUTPATIENT)
Dept: ADMINISTRATIVE | Facility: HOSPITAL | Age: 73
End: 2023-08-29
Payer: MEDICARE

## 2023-08-29 DIAGNOSIS — Z12.31 BREAST CANCER SCREENING BY MAMMOGRAM: Primary | ICD-10-CM

## 2023-09-06 ENCOUNTER — TELEPHONE (OUTPATIENT)
Dept: FAMILY MEDICINE CLINIC | Facility: CLINIC | Age: 73
End: 2023-09-06
Payer: MEDICARE

## 2023-09-11 DIAGNOSIS — M1A.9XX1 GOUT WITH TOPHUS: ICD-10-CM

## 2023-09-12 RX ORDER — PANTOPRAZOLE SODIUM 40 MG/1
TABLET, DELAYED RELEASE ORAL
Qty: 180 TABLET | Refills: 3 | Status: SHIPPED | OUTPATIENT
Start: 2023-09-12

## 2023-09-12 RX ORDER — ALLOPURINOL 100 MG/1
100 TABLET ORAL DAILY
Qty: 90 TABLET | Refills: 3 | Status: SHIPPED | OUTPATIENT
Start: 2023-09-12

## 2023-09-18 ENCOUNTER — TELEPHONE (OUTPATIENT)
Dept: FAMILY MEDICINE CLINIC | Facility: CLINIC | Age: 73
End: 2023-09-18

## 2023-09-18 NOTE — TELEPHONE ENCOUNTER
"    Caller: Alison Colvin \"BK\"    Relationship to patient: Self    Best call back number: [phone]    Patient is needing:     WOULD LIKE DOCTOR PATRICIA TO CALL IN A PRESCRIPTION FOR HOVEROUND PARTS.    SHE IS NEEDING NEW BACK WHEELS-AS HER BEARINGS ARE GOING OUT ON HER CURRENT ONES.    ALSO NEEDING NEW BATTERIES- AS HER CURRENT ONES ARE RUNNING OUT VERY FAST.       HOVER ROUND PHONE: [phone] [facility]          "

## 2023-09-20 ENCOUNTER — HOSPITAL ENCOUNTER (OUTPATIENT)
Dept: MAMMOGRAPHY | Facility: HOSPITAL | Age: 73
Discharge: HOME | End: 2023-09-20
Payer: MEDICARE

## 2023-09-20 DIAGNOSIS — R92.8 ABNORMAL MAMMOGRAM: ICD-10-CM

## 2023-09-20 DIAGNOSIS — Z12.31 BREAST CANCER SCREENING BY MAMMOGRAM: ICD-10-CM

## 2023-09-20 DIAGNOSIS — R92.8 ABNORMAL MAMMOGRAM: Primary | ICD-10-CM

## 2023-09-20 PROCEDURE — 77066 DX MAMMO INCL CAD BI: CPT

## 2023-09-22 ENCOUNTER — TELEPHONE (OUTPATIENT)
Dept: FAMILY MEDICINE CLINIC | Facility: CLINIC | Age: 73
End: 2023-09-22

## 2023-09-22 NOTE — TELEPHONE ENCOUNTER
Caller: CHRISTI    Best call back number: [phone]*    What was the call regarding: CALLING TO REPORT INR READING FOR 9/21/23, READING WAS 1.9.

## 2023-09-22 NOTE — TELEPHONE ENCOUNTER
"  Hub staff attempted to follow warm transfer process and was unsuccessful     Caller: Alison Colvin \"BK\"    Relationship to patient: Self    Best call back number: [phone]    Patient is needing: THE PATIENT WANTED TO UPDATE STAFF AND LET THEM KNOW THAT IS FEELING MUCH BETTER TODAY. HER FEVER HAS GONE DOWN, SHE IS NO LONGER VOMITING, AND HER DIARRHEA IS GONE.   "

## 2023-09-22 NOTE — TELEPHONE ENCOUNTER
I talked to pt yesterday and informed her to call me back today to see how she is feeling. Pt stated she is feeling much better today.

## 2023-09-27 NOTE — TELEPHONE ENCOUNTER
"  Caller: Alison Colvin \"BK\"    Relationship: Self    Best call back number: [phone]    What is the best time to reach you: ANY    Who are you requesting to speak with (clinical staff, provider,  specific staff member): CLINICAL STAFF     Do you know the name of the person who called:      What was the call regarding: PATIENT CALLED AND WANTING UPDATE ON THE [employer] PARTS THAT SHE REQUESTED TO BE ORDERED.  PLEASE CALL AND LET HER KNOW.      Is it okay if the provider responds through MyChart:           "

## 2023-09-28 NOTE — TELEPHONE ENCOUNTER
Called pt and informed her that I have called [facility] the third time. I did requested the parts   They stated that [facility] will fax me a form to fill out. I soon as I get the form I will complete it and fax it back to the [facility]. Pt understood verbally

## 2023-09-28 NOTE — TELEPHONE ENCOUNTER
I called pt and informed her that take warfarin 4mg daily Monday through Friday, then 6mg (one and a half tablets) on weekends. Check INR in 2 weeks and report back.   Pt understood verbally

## 2023-10-04 ENCOUNTER — TELEPHONE (OUTPATIENT)
Dept: FAMILY MEDICINE CLINIC | Facility: CLINIC | Age: 73
End: 2023-10-04
Payer: MEDICARE

## 2023-10-04 RX ORDER — TIZANIDINE 4 MG/1
4 TABLET ORAL 2 TIMES DAILY
Qty: 180 TABLET | Refills: 1 | OUTPATIENT
Start: 2023-10-04

## 2023-10-04 NOTE — TELEPHONE ENCOUNTER
Rx Refill Note  Requested Prescriptions      No prescriptions requested or ordered in this encounter      Last office visit with prescribing clinician: Visit date not found   Last telemedicine visit with prescribing clinician: Visit date not found   Next office visit with prescribing clinician: 2/29/2024                         Would you like a call back once the refill request has been completed: [] Yes [] No    If the office needs to give you a call back, can they leave a voicemail: [] Yes [] No    Payal Cooper MA  10/04/23, 11:47 EDT

## 2023-10-05 NOTE — TELEPHONE ENCOUNTER
Called pt and left a voicemail to inform pt   continue warfarin 4mg M-F and 6mg on weekends, then check INR again in one week.

## 2023-10-06 ENCOUNTER — TELEPHONE (OUTPATIENT)
Dept: FAMILY MEDICINE CLINIC | Facility: CLINIC | Age: 73
End: 2023-10-06
Payer: MEDICARE

## 2023-10-06 RX ORDER — TIZANIDINE 4 MG/1
4 TABLET ORAL 2 TIMES DAILY
Qty: 180 TABLET | Refills: 1 | OUTPATIENT
Start: 2023-10-06

## 2023-10-06 NOTE — TELEPHONE ENCOUNTER
Pharmacy Name:      Reference Number (if applicable):      Pharmacy representative name:      Pharmacy representative phone number:      What medication are you calling in regards to:      What question does the pharmacy have:      Who is the provider that prescribed the medication:      Additional notes: PATIENT CALLED STATED THAT HER PHARMACY  [facility] - [place], [place] - [address] [facility] [phone] [facility] [phone] [facility]  NEEDS KING GOMEZ MD GET APPROVED TO ORDER PATIENT PRESCRIPTIONS DUE TO SHE HAS SOME MEDICATIONS THAT NEED TO BE RENEWED.  THE PHARMACY PHONE # [phone] AND PATIENT PHONE # [phone].  ANY QUESTIONS PLEASE CALL THE PATIENT. THEY HAVE AN AUTHORIZATION # [id number] FOR KING GOMEZ MD

## 2023-10-09 NOTE — TELEPHONE ENCOUNTER
CALLED PT AND INFORMED HER THAT DR. GOMEZ HAS NEVER MET HER AND THAT HE NEEDS TO SEE HER IN THE CLINIC TO SEE WHY IS SHE NEEDING THE THREE MUSCLE RELAXER'S.

## 2023-10-11 RX ORDER — DULOXETIN HYDROCHLORIDE 60 MG/1
60 CAPSULE, DELAYED RELEASE ORAL DAILY
Qty: 90 CAPSULE | Refills: 1 | Status: SHIPPED | OUTPATIENT
Start: 2023-10-11

## 2023-10-11 RX ORDER — LEVOTHYROXINE SODIUM 0.05 MG/1
50 TABLET ORAL DAILY
Qty: 90 TABLET | Refills: 1 | Status: SHIPPED | OUTPATIENT
Start: 2023-10-11

## 2023-10-11 RX ORDER — FUROSEMIDE 20 MG/1
20 TABLET ORAL DAILY
Qty: 90 TABLET | Refills: 1 | Status: SHIPPED | OUTPATIENT
Start: 2023-10-11

## 2023-10-12 ENCOUNTER — OFFICE VISIT (OUTPATIENT)
Dept: FAMILY MEDICINE CLINIC | Facility: CLINIC | Age: 73
End: 2023-10-12
Payer: MEDICARE

## 2023-10-12 VITALS
HEART RATE: 70 BPM | BODY MASS INDEX: 54.86 KG/M2 | HEIGHT: 62 IN | SYSTOLIC BLOOD PRESSURE: 124 MMHG | OXYGEN SATURATION: 99 % | DIASTOLIC BLOOD PRESSURE: 68 MMHG

## 2023-10-12 DIAGNOSIS — M79.7 FIBROMYALGIA: ICD-10-CM

## 2023-10-12 DIAGNOSIS — J45.40 MODERATE PERSISTENT ASTHMA WITHOUT COMPLICATION: Primary | ICD-10-CM

## 2023-10-12 DIAGNOSIS — Z23 NEEDS FLU SHOT: ICD-10-CM

## 2023-10-12 PROCEDURE — G0008 ADMIN INFLUENZA VIRUS VAC: HCPCS | Performed by: INTERNAL MEDICINE

## 2023-10-12 PROCEDURE — 99214 OFFICE O/P EST MOD 30 MIN: CPT | Performed by: INTERNAL MEDICINE

## 2023-10-12 PROCEDURE — 1160F RVW MEDS BY RX/DR IN RCRD: CPT | Performed by: INTERNAL MEDICINE

## 2023-10-12 PROCEDURE — 90662 IIV NO PRSV INCREASED AG IM: CPT | Performed by: INTERNAL MEDICINE

## 2023-10-12 PROCEDURE — 3074F SYST BP LT 130 MM HG: CPT | Performed by: INTERNAL MEDICINE

## 2023-10-12 PROCEDURE — 1159F MED LIST DOCD IN RCRD: CPT | Performed by: INTERNAL MEDICINE

## 2023-10-12 PROCEDURE — 3078F DIAST BP <80 MM HG: CPT | Performed by: INTERNAL MEDICINE

## 2023-10-12 RX ORDER — PREDNISONE 1 MG/1
4 TABLET ORAL DAILY
Qty: 120 TABLET | Refills: 3 | Status: SHIPPED | OUTPATIENT
Start: 2023-10-12

## 2023-10-12 RX ORDER — BACLOFEN 10 MG/1
10 TABLET ORAL 2 TIMES DAILY
Qty: 180 TABLET | Refills: 1 | Status: SHIPPED | OUTPATIENT
Start: 2023-10-12

## 2023-10-12 RX ORDER — TIZANIDINE 4 MG/1
4 TABLET ORAL 2 TIMES DAILY
Qty: 180 TABLET | Refills: 1 | Status: SHIPPED | OUTPATIENT
Start: 2023-10-12

## 2023-10-12 RX ORDER — UMECLIDINIUM BROMIDE AND VILANTEROL TRIFENATATE 62.5; 25 UG/1; UG/1
1 POWDER RESPIRATORY (INHALATION)
Qty: 60 EACH | Refills: 11 | Status: SHIPPED | OUTPATIENT
Start: 2023-10-12

## 2023-10-18 ENCOUNTER — TELEPHONE (OUTPATIENT)
Dept: FAMILY MEDICINE CLINIC | Facility: CLINIC | Age: 73
End: 2023-10-18
Payer: MEDICARE

## 2023-10-18 NOTE — TELEPHONE ENCOUNTER
Faith with [facility] @ [phone] stated that she is needing a  update with the information that was faxed over to us on 10/6/2023

## 2023-10-18 NOTE — TELEPHONE ENCOUNTER
"Caller: Alison Colvin \"BK\"    Relationship to patient: Self    Best call back number: [phone]     PATIENT IS CALLING TO INFORM DR GOMEZ THAT [employer] WILL BE FAXING OVER SOME PAPERWORK THAT HE NEEDS TO APPROVE SO HER CHAIR CAN BE FIXED         "

## 2023-10-23 PROBLEM — R78.81 BACTEREMIA: Status: RESOLVED | Noted: 2021-05-10 | Resolved: 2023-10-23

## 2023-10-23 PROBLEM — B95.62 CELLULITIS DUE TO MRSA: Status: RESOLVED | Noted: 2021-05-10 | Resolved: 2023-10-23

## 2023-10-23 PROBLEM — L97.811: Status: RESOLVED | Noted: 2022-10-06 | Resolved: 2023-10-23

## 2023-10-23 PROBLEM — R50.9 FEVER: Status: RESOLVED | Noted: 2019-06-15 | Resolved: 2023-10-23

## 2023-10-23 PROBLEM — I10 HYPERTENSION: Status: RESOLVED | Noted: 2021-05-10 | Resolved: 2023-10-23

## 2023-10-23 PROBLEM — L03.90 CELLULITIS DUE TO MRSA: Status: RESOLVED | Noted: 2021-05-10 | Resolved: 2023-10-23

## 2023-10-23 NOTE — PROGRESS NOTES
Stan Badillo MD  Internal Medicine  [phone] (office)             10/12/2023    Patient Information  Alison Colvin                                                                                          [address] [facility] [place]  1950        Chief Complaint   Patient presents with    [facility]    Med Refill          History of Present Illness:    Alison Colvin is a 73 y.o. female who presents to the office to establish care with new PCP. Patient has morbid obesity, chronic pain, PAF, HTN, h/o PE, and chronic pain.     She reports asthma is controlled with prednisone and as needed various nebulizer treatments.     She is taking hydrocodone/APAP (through pain management), baclofen, tizanidine for chronic pain.       Allergies   Allergen Reactions    Pneumococcal Vaccines Delirium     Fever, chills    Asa [Aspirin]     Levofloxacin     Meperidine Other (See Comments)    Naproxen     Pregabalin Other (See Comments)    Propoxyphene     Sulfa Antibiotics Other (See Comments)           Current Outpatient Medications:     allopurinol (ZYLOPRIM) 100 MG tablet, TAKE 1 TABLET BY MOUTH  DAILY, Disp: 90 tablet, Rfl: 3    atorvastatin (LIPITOR) 10 MG tablet, Take 1 tablet by mouth Daily., Disp: 90 tablet, Rfl: 3    baclofen (LIORESAL) 10 MG tablet, Take 1 tablet by mouth 2 (Two) Times a Day., Disp: 180 tablet, Rfl: 1    BROVANA 15 MCG/2ML nebulizer solution, USE 1 VIAL PER NEBULIZER BID, Disp: , Rfl: 5    budesonide (PULMICORT) 1 MG/2ML nebulizer solution, Pulmicort SUSP; Patient Sig: Pulmicort SUSP USE 1 UNIT DOSE VIA NEBULIZER TWICE DAILY; 0; 25-Mar-2013; Active, Disp: , Rfl:     calcium carbonate (OS-SEBASTIÁN) 600 MG tablet, Take 1 tablet by mouth 2 (Two) Times a Day With Meals., Disp: , Rfl:     DULoxetine (CYMBALTA) 60 MG capsule, TAKE 1 CAPSULE BY MOUTH DAILY, Disp: 90 capsule, Rfl: 1    furosemide (LASIX) 20 MG tablet, TAKE 1 TABLET BY MOUTH DAILY, Disp: 90 tablet, Rfl: 1     ipratropium-albuterol (DUO-NEB) 0.5-2.5 mg/mL nebulizer, USE 1 VIAL PER NEBULIZER QID, Disp: , Rfl: 5    levothyroxine (SYNTHROID, LEVOTHROID) 50 MCG tablet, TAKE 1 TABLET BY MOUTH DAILY, Disp: 90 tablet, Rfl: 1    metoclopramide (REGLAN) 10 MG tablet, TAKE 1 TABLET BY MOUTH 4  TIMES DAILY, Disp: 360 tablet, Rfl: 3    Multiple Vitamins-Minerals (WOMENS 50+ MULTI VITAMIN/MIN PO), Take 1 tablet by mouth Daily., Disp: , Rfl:     nystatin (MYCOSTATIN) 234399 UNIT/GM powder, Apply  topically to the appropriate area as directed Every 12 (Twelve) Hours., Disp: 15 g, Rfl: 0    OXYGEN-HELIUM IN, Inhale., Disp: , Rfl:     pantoprazole (PROTONIX) 40 MG EC tablet, TAKE 1 TABLET BY MOUTH  TWICE DAILY, Disp: 180 tablet, Rfl: 3    potassium chloride (K-DUR,KLOR-CON) 20 MEQ CR tablet, TAKE 1 TABLET BY MOUTH  TWICE DAILY, Disp: 180 tablet, Rfl: 0    rOPINIRole (REQUIP) 0.25 MG tablet, TAKE 1 TABLET BY MOUTH EVERY  NIGHT ONE HOUR BEFORE BEDTIME, Disp: 90 tablet, Rfl: 0    tiZANidine (ZANAFLEX) 4 MG tablet, Take 1 tablet by mouth 2 (Two) Times a Day., Disp: 180 tablet, Rfl: 1    topiramate (TOPAMAX) 25 MG tablet, TAKE 1 TABLET BY MOUTH  TWICE DAILY, Disp: 180 tablet, Rfl: 3    warfarin (Coumadin) 1 MG tablet, Take 1 tablet twice a week in addition to her 4 mg daily, Disp: 30 tablet, Rfl: 5    warfarin (COUMADIN) 4 MG tablet, TAKE 1 TABLET BY MOUTH AT  NIGHT AS DIRECTED, Disp: 90 tablet, Rfl: 3    predniSONE (DELTASONE) 1 MG tablet, Take 4 tablets by mouth Daily., Disp: 120 tablet, Rfl: 3    Umeclidinium-Vilanterol (Anoro Ellipta) 62.5-25 MCG/ACT aerosol powder  inhaler, Inhale 1 puff Daily., Disp: 60 each, Rfl: 11      Social History     Socioeconomic History    Marital status:    Tobacco Use    Smoking status: Never    Smokeless tobacco: Never   Vaping Use    Vaping Use: Never used   Substance and Sexual Activity    Alcohol use: No    Drug use: No    Sexual activity: Defer         Vitals:    10/12/23 1051   BP: 124/68   BP  "Location: Right arm   Patient Position: Sitting   Cuff Size: Adult   Pulse: 70   SpO2: 99%   Height: 157.5 cm (62.01\")      Wt Readings from Last 3 Encounters:   10/18/22 136 kg (300 lb)   09/27/22 136 kg (300 lb)   10/24/19 104 kg (230 lb)     Body mass index is 54.86 kg/m².      Physical Exam  Vitals reviewed.   Constitutional:       Appearance: Normal appearance. She is obese.      Comments: Sitting comfortably in wheelchair   Pulmonary:      Effort: Pulmonary effort is normal.   Neurological:      Mental Status: She is alert and oriented to person, place, and time.   Psychiatric:         Mood and Affect: Mood normal.         Behavior: Behavior normal.            Lab/other results:  Common labs          2/16/2023    08:44 8/17/2023    08:38   Common Labs   Glucose 97  107    BUN 24  15    Creatinine 1.57  1.51    Sodium 139  139    Potassium 4.7  4.5    Chloride 102  103    Calcium 9.6  9.6    Total Protein 6.6  6.2    Albumin 4.3  4.0    Total Bilirubin 0.3  0.3    Alkaline Phosphatase 56  65    AST (SGOT) 13  16    ALT (SGPT) 12  12    WBC 10.18  8.49    Hemoglobin 11.5  10.8    Hematocrit 36.2  33.3    Platelets 162  177    Total Cholesterol 249  213    Triglycerides 206  187    HDL Cholesterol 76  70    LDL Cholesterol  137  111        Assessment/Plan:    Diagnoses and all orders for this visit:    1. Moderate persistent asthma without complication (Primary)  Comments:  Will try to taper off prednisone, start Anoro Ellipta.  Orders:  -     Umeclidinium-Vilanterol (Anoro Ellipta) 62.5-25 MCG/ACT aerosol powder  inhaler; Inhale 1 puff Daily.  Dispense: 60 each; Refill: 11  -     predniSONE (DELTASONE) 1 MG tablet; Take 4 tablets by mouth Daily.  Dispense: 120 tablet; Refill: 3    2. Needs flu shot  -     Fluzone High-Dose 65+yrs (5004-7000)    3. Fibromyalgia  Comments:  Discussed my concerns with concurrent opioid use. Patient insistent that she tolerates without problem.  Orders:  -     baclofen (LIORESAL) 10 " MG tablet; Take 1 tablet by mouth 2 (Two) Times a Day.  Dispense: 180 tablet; Refill: 1  -     tiZANidine (ZANAFLEX) 4 MG tablet; Take 1 tablet by mouth 2 (Two) Times a Day.  Dispense: 180 tablet; Refill: 1

## 2023-10-24 ENCOUNTER — TELEPHONE (OUTPATIENT)
Dept: FAMILY MEDICINE CLINIC | Facility: CLINIC | Age: 73
End: 2023-10-24
Payer: MEDICARE

## 2023-10-24 ENCOUNTER — TELEPHONE (OUTPATIENT)
Dept: FAMILY MEDICINE CLINIC | Facility: CLINIC | Age: 73
End: 2023-10-24

## 2023-10-24 RX ORDER — POTASSIUM CHLORIDE 20 MEQ/1
TABLET, EXTENDED RELEASE ORAL
Qty: 180 TABLET | Refills: 3 | Status: SHIPPED | OUTPATIENT
Start: 2023-10-24

## 2023-10-24 NOTE — TELEPHONE ENCOUNTER
----- Message from Corky Badillo MD sent at 10/23/2023 12:56 PM EDT -----  Regarding: FW:  Please inform patient that INR is still too high and we need to decrease warfarin dose. Have her take 4mg daily. Thanks.  ----- Message -----  From: Interface, Scans Incoming  Sent: 10/23/2023  11:46 AM EDT  To: Corky Badillo MD

## 2023-10-24 NOTE — TELEPHONE ENCOUNTER
"  Caller: Alison Colvin \"BK\"    Relationship: Self    Best call back number: [phone]       What was the call regarding: PATIENT STATES THAT [employer] SENT A FAX ON FRIDAY 10/20 TO GET PROVIDER TO SIGN OFF FOR PARTS FOR HER MOBILE CHAIR. SHE WOULD LIKE A STATUS UPDATE   "

## 2023-10-24 NOTE — TELEPHONE ENCOUNTER
Called pt and informed her INR is still too high and we need to decrease warfarin dose. Have her take 4mg daily.   Pt understood verbally

## 2023-10-25 NOTE — TELEPHONE ENCOUNTER
CALLED PT AND INFORMED HER THAT THE [employer]M WITH THE PROGRESS NOTE HAS BEEN FAXED TO THEM THIS MORNING 10/25/23. PT UNDERSTOOD VERBALLY AND WILL CONTACT THEM TO SEE IF THEY NEED ANYTHING ELSE.

## 2023-10-26 ENCOUNTER — TELEPHONE (OUTPATIENT)
Dept: FAMILY MEDICINE CLINIC | Facility: CLINIC | Age: 73
End: 2023-10-26

## 2023-10-26 NOTE — TELEPHONE ENCOUNTER
"  Caller: Alison Colvin \"BK\"    Relationship: Self    Best call back number: [phone]     What is the best time to reach you: ANYTIME    Who are you requesting to speak with (clinical staff, provider,  specific staff member): CLINICAL    What was the call regarding: PATIENT STATED SHE HAS AN EPIDURAL SCHEDULED FOR WEDNESDAY 11-.    PATIENT IS REQUESTING TO KNOW WHAT SHE SHOULD DO TO LOWER HER INR TO AROUND 1.    PLEASE CALL TO DISCUSS AND ADVISE.  "

## 2023-10-27 ENCOUNTER — TELEPHONE (OUTPATIENT)
Dept: FAMILY MEDICINE CLINIC | Facility: CLINIC | Age: 73
End: 2023-10-27
Payer: MEDICARE

## 2023-10-27 NOTE — TELEPHONE ENCOUNTER
Called pt and informed her I spoke with Dr. Pat about her concern about her INR being 3.5 on 10/20/23 and being on 4mg of Warfarin because it was too high.   Her Epidural is scheduled for Wednesday 11/1/23 and pt wants to make her INR around 1 and is requesting help.      I spoke with pt and informed her we would be happy to let Dr. Pat Know and he can enter a order for home health to give her in injection for her anticoagulant.      Pt did refuse the home health and stated that she has done this before and she does not need a home health order for the injection that Dr. Pat is welling to enter it for her.      Pt wants to be of her Warfarin for couple of days and get her procedure done.   Dr. Pat informed me to inform her that she needs to DISCONTINUE HER WARFARIN FOR 5 DAYS BEFORE THE EPIDURAL PROCEDURE.  Pt understood verbally and informed me that she had not had her Warfarin today and will not take it until the Procedure.

## 2023-11-02 ENCOUNTER — TELEPHONE (OUTPATIENT)
Dept: FAMILY MEDICINE CLINIC | Facility: CLINIC | Age: 73
End: 2023-11-02
Payer: MEDICARE

## 2023-11-02 RX ORDER — ROPINIROLE 0.25 MG/1
TABLET, FILM COATED ORAL
Qty: 90 TABLET | Refills: 3 | Status: SHIPPED | OUTPATIENT
Start: 2023-11-02

## 2023-11-08 ENCOUNTER — TELEPHONE (OUTPATIENT)
Dept: FAMILY MEDICINE CLINIC | Facility: CLINIC | Age: 73
End: 2023-11-08

## 2023-11-22 ENCOUNTER — TELEPHONE (OUTPATIENT)
Dept: FAMILY MEDICINE CLINIC | Facility: CLINIC | Age: 73
End: 2023-11-22

## 2023-11-22 NOTE — TELEPHONE ENCOUNTER
Hub staff attempted to follow warm transfer process and was unsuccessful     Caller: CHANDLER    Relationship to patient:     Best call back number:      Patient is needing: CHANDLER WITH INDR CALLED WITH ALERT INR FOR THIS PATIENT AND UNABLE TO TRANSFER TO THE OFFICE DUE NO ANSWER AND CALLER CHANDLER STATED THE CALL TIMED OUT  WOULD CALL BACK THEN HUNG UP.  WAS NOT ABLE TO TRANSFER TO [facility] AFTER FAILED WT.

## 2023-12-06 ENCOUNTER — OFFICE VISIT (OUTPATIENT)
Dept: FAMILY MEDICINE CLINIC | Facility: CLINIC | Age: 73
End: 2023-12-06
Payer: MEDICARE

## 2023-12-06 VITALS
RESPIRATION RATE: 16 BRPM | SYSTOLIC BLOOD PRESSURE: 122 MMHG | DIASTOLIC BLOOD PRESSURE: 78 MMHG | BODY MASS INDEX: 54.86 KG/M2 | HEART RATE: 109 BPM | HEIGHT: 62 IN | OXYGEN SATURATION: 80 %

## 2023-12-06 DIAGNOSIS — J45.40 MODERATE PERSISTENT ASTHMA WITHOUT COMPLICATION: Primary | ICD-10-CM

## 2023-12-06 DIAGNOSIS — E78.2 MIXED HYPERLIPIDEMIA: ICD-10-CM

## 2023-12-06 DIAGNOSIS — G47.62 NOCTURNAL LEG CRAMPS: ICD-10-CM

## 2023-12-06 PROCEDURE — 3078F DIAST BP <80 MM HG: CPT | Performed by: INTERNAL MEDICINE

## 2023-12-06 PROCEDURE — 1170F FXNL STATUS ASSESSED: CPT | Performed by: INTERNAL MEDICINE

## 2023-12-06 PROCEDURE — G0439 PPPS, SUBSEQ VISIT: HCPCS | Performed by: INTERNAL MEDICINE

## 2023-12-06 PROCEDURE — 99214 OFFICE O/P EST MOD 30 MIN: CPT | Performed by: INTERNAL MEDICINE

## 2023-12-06 PROCEDURE — 3074F SYST BP LT 130 MM HG: CPT | Performed by: INTERNAL MEDICINE

## 2023-12-06 RX ORDER — ROPINIROLE 0.25 MG/1
0.5 TABLET, FILM COATED ORAL NIGHTLY
Qty: 90 TABLET | Refills: 3 | Status: SHIPPED | OUTPATIENT
Start: 2023-12-06

## 2023-12-06 RX ORDER — PREDNISONE 1 MG/1
3 TABLET ORAL DAILY
Qty: 120 TABLET | Refills: 3 | Status: SHIPPED | OUTPATIENT
Start: 2023-12-06

## 2023-12-06 RX ORDER — ATORVASTATIN CALCIUM 40 MG/1
40 TABLET, FILM COATED ORAL DAILY
Qty: 90 TABLET | Refills: 3 | Status: SHIPPED | OUTPATIENT
Start: 2023-12-06

## 2023-12-06 NOTE — PROGRESS NOTES
The ABCs of the Annual Wellness Visit  Subsequent Medicare Wellness Visit    Subjective    Alison Colvin is a 73 y.o. female who presents for a Subsequent Medicare Wellness Visit.    The following portions of the patient's history were reviewed and   updated as appropriate: allergies, current medications, past family history, past medical history, past social history, past surgical history, and problem list.    Compared to one year ago, the patient feels her physical   health is better.    Compared to one year ago, the patient feels her mental   health is worse.    Recent Hospitalizations:  She was not admitted to the hospital during the last year.       Current Medical Providers:  Patient Care Team:  Corky Badillo MD as PCP - General (Internal Medicine)    Outpatient Medications Prior to Visit   Medication Sig Dispense Refill    allopurinol (ZYLOPRIM) 100 MG tablet TAKE 1 TABLET BY MOUTH  DAILY 90 tablet 3    baclofen (LIORESAL) 10 MG tablet Take 1 tablet by mouth 2 (Two) Times a Day. 180 tablet 1    BROVANA 15 MCG/2ML nebulizer solution USE 1 VIAL PER NEBULIZER BID  5    budesonide (PULMICORT) 1 MG/2ML nebulizer solution Pulmicort SUSP; Patient Sig: Pulmicort SUSP USE 1 UNIT DOSE VIA NEBULIZER TWICE DAILY; 0; 25-Mar-2013; Active      calcium carbonate (OS-SEBASTIÁN) 600 MG tablet Take 1 tablet by mouth 2 (Two) Times a Day With Meals.      DULoxetine (CYMBALTA) 60 MG capsule TAKE 1 CAPSULE BY MOUTH DAILY 90 capsule 1    furosemide (LASIX) 20 MG tablet TAKE 1 TABLET BY MOUTH DAILY 90 tablet 1    ipratropium-albuterol (DUO-NEB) 0.5-2.5 mg/mL nebulizer USE 1 VIAL PER NEBULIZER QID  5    levothyroxine (SYNTHROID, LEVOTHROID) 50 MCG tablet TAKE 1 TABLET BY MOUTH DAILY 90 tablet 1    metoclopramide (REGLAN) 10 MG tablet TAKE 1 TABLET BY MOUTH 4  TIMES DAILY 360 tablet 3    Multiple Vitamins-Minerals (WOMENS 50+ MULTI VITAMIN/MIN PO) Take 1 tablet by mouth Daily.      nystatin (MYCOSTATIN) 122113 UNIT/GM powder  Apply  topically to the appropriate area as directed Every 12 (Twelve) Hours. 15 g 0    OXYGEN-HELIUM IN Inhale.      pantoprazole (PROTONIX) 40 MG EC tablet TAKE 1 TABLET BY MOUTH  TWICE DAILY 180 tablet 3    potassium chloride (K-DUR,KLOR-CON) 20 MEQ CR tablet TAKE 1 TABLET BY MOUTH TWICE  DAILY 180 tablet 3    tiZANidine (ZANAFLEX) 4 MG tablet Take 1 tablet by mouth 2 (Two) Times a Day. 180 tablet 1    topiramate (TOPAMAX) 25 MG tablet TAKE 1 TABLET BY MOUTH  TWICE DAILY 180 tablet 3    Umeclidinium-Vilanterol (Anoro Ellipta) 62.5-25 MCG/ACT aerosol powder  inhaler Inhale 1 puff Daily. 60 each 11    warfarin (Coumadin) 1 MG tablet Take 1 tablet twice a week in addition to her 4 mg daily 30 tablet 5    warfarin (COUMADIN) 4 MG tablet TAKE 1 TABLET BY MOUTH AT  NIGHT AS DIRECTED 90 tablet 3    atorvastatin (LIPITOR) 10 MG tablet Take 1 tablet by mouth Daily. 90 tablet 3    predniSONE (DELTASONE) 1 MG tablet Take 4 tablets by mouth Daily. 120 tablet 3    rOPINIRole (REQUIP) 0.25 MG tablet TAKE 1 TABLET BY MOUTH AT NIGHT  1 HOUR BEFORE BEDTIME 90 tablet 3     No facility-administered medications prior to visit.       No opioid medication identified on active medication list. I have reviewed chart for other potential  high risk medication/s and harmful drug interactions in the elderly.        Aspirin is not on active medication list.  Aspirin use is contraindicated for this patient due to: current use of warfarin.  .    Patient Active Problem List   Diagnosis    Anemia    Asthma    Benign essential hypertension    Chronic venous insufficiency    Depression    Degeneration of intervertebral disc of lumbosacral region    Duodenal ulcer    Gastroesophageal reflux disease    Fibromyalgia    Gastric ulcer    Gastroparesis    Hypothyroidism    Migraine    Peripheral neuropathy    Restrictive lung disease    Urinary incontinence    Edema of leg    Osteoarthritis of both hands    Gout with tophus    PAF (paroxysmal atrial  "fibrillation)    History of pulmonary embolism    History of deep vein thrombophlebitis of lower extremity    History of inferior vena caval filter placement    Inferior vena cava occlusion    Chronic renal impairment, stage 3 (moderate)    Nocturnal hypoxia    Obstructive sleep apnea syndrome    Colon cancer    Diastolic dysfunction    History of hysterectomy with oophorectomy    IBS (irritable bowel syndrome)    Morbid obesity    Osteoarthritis of lumbar spine    Pulmonary hypertension    S/P partial colectomy    Stroke, thrombotic    Age-related osteoporosis without current pathological fracture    Mixed hyperlipidemia    Nocturnal leg cramps     Advance Care Planning   Advance Care Planning     Advance Directive is not on file.  ACP discussion was held with the patient during this visit. Patient has an advance directive (not in EMR), copy requested.     Objective    Vitals:    12/06/23 0846   BP: 122/78   BP Location: Right arm   Patient Position: Sitting   Cuff Size: Adult   Pulse: 109   Resp: 16   SpO2: (!) 80%   Height: 157.5 cm (62.01\")     Estimated body mass index is 54.86 kg/m² as calculated from the following:    Height as of this encounter: 157.5 cm (62.01\").    Weight as of 10/18/22: 136 kg (300 lb).    Class 3 Severe Obesity (BMI >=40). Obesity-related health conditions include the following: hypertension, dyslipidemias, and osteoarthritis. Obesity is unchanged. BMI is is above average; BMI management plan is completed. We discussed portion control and increasing exercise.      Does the patient have evidence of cognitive impairment? No          HEALTH RISK ASSESSMENT    Smoking Status:  Social History     Tobacco Use   Smoking Status Never   Smokeless Tobacco Never     Alcohol Consumption:  Social History     Substance and Sexual Activity   Alcohol Use No     Fall Risk Screen:    STEADI Fall Risk Assessment was completed, and patient is at HIGH risk for falls. Assessment completed " on:2023    Depression Screenin/6/2023     8:46 AM   PHQ-2/PHQ-9 Depression Screening   Little Interest or Pleasure in Doing Things 0-->not at all   Feeling Down, Depressed or Hopeless 0-->not at all   PHQ-9: Brief Depression Severity Measure Score 0       Health Habits and Functional and Cognitive Screenin/6/2023     8:44 AM   Functional & Cognitive Status   Do you have difficulty preparing food and eating? Yes   Do you have difficulty bathing yourself, getting dressed or grooming yourself? No   Do you have difficulty using the toilet? No   Do you have difficulty moving around from place to place? No   Do you have trouble with steps or getting out of a bed or a chair? No   Current Diet Well Balanced Diet   Dental Exam Not up to date   Eye Exam Not up to date   Exercise (times per week) 4 times per week   Current Exercises Include Other   Do you need help using the phone?  No   Are you deaf or do you have serious difficulty hearing?  No   Do you need help to go to places out of walking distance? Yes   Do you need help shopping? Yes   Do you need help preparing meals?  Yes   Do you need help with housework?  Yes   Do you need help with laundry? Yes   Do you need help taking your medications? No   Do you need help managing money? No   Do you ever drive or ride in a car without wearing a seat belt? No   Have you felt unusual stress, anger or loneliness in the last month? No   Who do you live with? Spouse   If you need help, do you have trouble finding someone available to you? Yes   Have you been bothered in the last four weeks by sexual problems? No   Do you have difficulty concentrating, remembering or making decisions? No       Age-appropriate Screening Schedule:  Refer to the list below for future screening recommendations based on patient's age, sex and/or medical conditions. Orders for these recommended tests are listed in the plan section. The patient has been provided with a written  plan.    Health Maintenance   Topic Date Due    ZOSTER VACCINE (1 of 2) Never done    COVID-19 Vaccine (4 - 2023-24 season) 09/01/2023    DXA SCAN  05/20/2024    LIPID PANEL  08/17/2024    ANNUAL WELLNESS VISIT  12/06/2024    TDAP/TD VACCINES (2 - Td or Tdap) 01/25/2029    HEPATITIS C SCREENING  Completed    INFLUENZA VACCINE  Completed    FECAL OCCULT BLOOD TEST  Discontinued    COLONOSCOPY  Discontinued                  CMS Preventative Services Quick Reference  Risk Factors Identified During Encounter  Chronic Pain:  Continue current medication regimen  Depression/Dysphoria: Current medication is effective, no change recommended  Immunizations Discussed/Encouraged: Shingrix, COVID19, and RSV (Respiratory Syncytial Virus)  Polypharmacy: Medication List reviewed and Medications are appropriate for patient  The above risks/problems have been discussed with the patient.  Pertinent information has been shared with the patient in the After Visit Summary.  An After Visit Summary and PPPS were made available to the patient.    Follow Up:   Next Medicare Wellness visit to be scheduled in 1 year.       Additional E&M Note during same encounter follows:  Patient has multiple medical problems which are significant and separately identifiable that require additional work above and beyond the Medicare Wellness Visit.      Chief Complaint  Medicare Wellness-subsequent    Subjective          Alison Colvin is also being seen today for asthma, nocturnal leg cramps and HLD.     She is sedentary. Reviewed recent labs. She is tolerating atorvastatin 10mg daily without problem and amenable to increasing.     She reports taking prednisone for decades for her lung disease. She reports dyspnea controlled since starting Anoro Ellipta. Patient is amenable to further decreases of prednisone.     She reports nocturnal leg cramps occasionally.       Objective   Vital Signs:  /78 (BP Location: Right arm, Patient Position: Sitting, Cuff  "Size: Adult)   Pulse 109   Resp 16   Ht 157.5 cm (62.01\")   SpO2 (!) 80%   BMI 54.86 kg/m²     Physical Exam  Vitals reviewed.   Constitutional:       Appearance: Normal appearance. She is obese.      Comments: Seated comfortably in wheelchair   Neurological:      Mental Status: She is alert and oriented to person, place, and time.   Psychiatric:         Mood and Affect: Mood normal.         Behavior: Behavior normal.          The following data was reviewed by: Corky Badillo MD on 12/06/2023:  Common labs          2/16/2023    08:44 8/17/2023    08:38   Common Labs   Glucose 97  107    BUN 24  15    Creatinine 1.57  1.51    Sodium 139  139    Potassium 4.7  4.5    Chloride 102  103    Calcium 9.6  9.6    Total Protein 6.6  6.2    Albumin 4.3  4.0    Total Bilirubin 0.3  0.3    Alkaline Phosphatase 56  65    AST (SGOT) 13  16    ALT (SGPT) 12  12    WBC 10.18  8.49    Hemoglobin 11.5  10.8    Hematocrit 36.2  33.3    Platelets 162  177    Total Cholesterol 249  213    Triglycerides 206  187    HDL Cholesterol 76  70    LDL Cholesterol  137  111      Data reviewed : 12/6/23         Assessment and Plan   Diagnoses and all orders for this visit:    1. Moderate persistent asthma without complication (Primary)  Comments:  Will continue to try to taper off prednisone, continue Anoro Ellipta. Discussed potential side effects of chronic CS use. Follow-up in one month.  Orders:  -     predniSONE (DELTASONE) 1 MG tablet; Take 3 tablets by mouth Daily.  Dispense: 120 tablet; Refill: 3    2. Mixed hyperlipidemia  Comments:  Increase atorvastatin to 40mg daily and check labs again in 6-12 months.  Orders:  -     atorvastatin (LIPITOR) 40 MG tablet; Take 1 tablet by mouth Daily.  Dispense: 90 tablet; Refill: 3    3. Nocturnal leg cramps  Comments:  Discussed and demonstrated some stretches. Pickle juice may also be helpful. Increase ropinirole to 0.5mg QHS in case RLS is contributing.  Orders:  -     rOPINIRole " (REQUIP) 0.25 MG tablet; Take 2 tablets by mouth Every Night. Take 1 hour before bedtime.  Dispense: 90 tablet; Refill: 3             Follow Up   No follow-ups on file.  Patient was given instructions and counseling regarding her condition or for health maintenance advice. Please see specific information pulled into the AVS if appropriate.

## 2023-12-17 DIAGNOSIS — I48.0 PAF (PAROXYSMAL ATRIAL FIBRILLATION): ICD-10-CM

## 2023-12-18 RX ORDER — WARFARIN SODIUM 4 MG/1
TABLET ORAL
Qty: 90 TABLET | Refills: 3 | Status: SHIPPED | OUTPATIENT
Start: 2023-12-18

## 2023-12-18 NOTE — TELEPHONE ENCOUNTER
Rx Refill Note  Requested Prescriptions     Pending Prescriptions Disp Refills    warfarin (COUMADIN) 4 MG tablet [Pharmacy Med Name: Warfarin Sodium 4 MG Oral Tablet] 90 tablet 3     Sig: TAKE 1 TABLET BY MOUTH AT NIGHT  AS DIRECTED      Last office visit with prescribing clinician: 8/23/2023   Last telemedicine visit with prescribing clinician: Visit date not found   Next office visit with prescribing clinician: Visit date not found                         Would you like a call back once the refill request has been completed: [] Yes [] No    If the office needs to give you a call back, can they leave a voicemail: [] Yes [] No    Payal Cooper MA  12/18/23, 11:47 EST

## 2023-12-20 ENCOUNTER — TELEPHONE (OUTPATIENT)
Dept: FAMILY MEDICINE CLINIC | Facility: CLINIC | Age: 73
End: 2023-12-20

## 2023-12-20 NOTE — TELEPHONE ENCOUNTER
"    Caller: Alison Colvin \"BK\"    Relationship: Self    Best call back number: [phone]     What medication are you requesting: ANTIBIOTICS    What are your current symptoms: COUGH, NOSE DRIP, SORE THROAT, CONGESTION, COUGHING UP BROWNISH MUCUS    How long have you been experiencing symptoms: 4 DAYS    Have you had these symptoms before:    [] Yes  [x] No    Have you been treated for these symptoms before:   [] Yes  [x] No    If a prescription is needed, what is your preferred pharmacy and phone number: [id number] - [facility] [place], [place] - [address] - [phone]  - [phone] FX     Additional notes: PLEASE ADVISE        "

## 2024-01-03 ENCOUNTER — TELEPHONE (OUTPATIENT)
Dept: FAMILY MEDICINE CLINIC | Facility: CLINIC | Age: 74
End: 2024-01-03

## 2024-01-03 NOTE — TELEPHONE ENCOUNTER
I called and spoke with pt and informed her that INR is slightly lower than goal, but Dr. Badillo don't think we need to change warfarin dose quite yet. I informed her to continue current schedule and check again in 1-2 weeks.

## 2024-01-03 NOTE — TELEPHONE ENCOUNTER
Caller: SRIRAM    Relationship: MD WALKER    Best call back number:     What is the best time to reach you:     Who are you requesting to speak with (clinical staff, provider,  specific staff member):     Do you know the name of the person who called:     What was the call regarding: SRIRAM WITH [facility] IS CALLING IN TO INFORM DR GOMEZ OF THE PATIENTS INR READING TAKEN ON 01/02/24.  THE READING WAS 1.9      Is it okay if the provider responds through MyChart:

## 2024-01-17 ENCOUNTER — TELEPHONE (OUTPATIENT)
Dept: FAMILY MEDICINE CLINIC | Facility: CLINIC | Age: 74
End: 2024-01-17

## 2024-01-24 ENCOUNTER — TELEPHONE (OUTPATIENT)
Dept: FAMILY MEDICINE CLINIC | Facility: CLINIC | Age: 74
End: 2024-01-24

## 2024-01-24 NOTE — TELEPHONE ENCOUNTER
Caller: MD INR    Best call back number: [phone]    What test was performed: INR    When was the test performed: 1/23/24    Additional notes: PT'S INR WAS 3.9

## 2024-01-24 NOTE — TELEPHONE ENCOUNTER
I have called pt and instruct patient to decrease weekly dose by 10% and repeat INR in one week. Pt understood verbally

## 2024-01-29 DIAGNOSIS — J45.40 MODERATE PERSISTENT ASTHMA WITHOUT COMPLICATION: ICD-10-CM

## 2024-01-29 RX ORDER — PREDNISONE 1 MG/1
2 TABLET ORAL DAILY
Qty: 60 TABLET | Refills: 0 | Status: SHIPPED | OUTPATIENT
Start: 2024-01-29

## 2024-01-29 NOTE — TELEPHONE ENCOUNTER
"  Caller: Alison Colvin \"BK\"    Relationship: Self    Best call back number: [phone]      Requested Prescriptions:   Requested Prescriptions     Pending Prescriptions Disp Refills    predniSONE (DELTASONE) 1 MG tablet 120 tablet 3     Sig: Take 3 tablets by mouth Daily.        Pharmacy where request should be sent: [facility] - [place] [address] [facility] [phone] [facility] [phone]      Last office visit with prescribing clinician: 12/6/2023   Last telemedicine visit with prescribing clinician: Visit date not found   Next office visit with prescribing clinician: 2/29/2024     Additional details provided by patient: PATIENT HAS 13 LEFT       Does the patient have less than a 3 day supply:  [] Yes  [x] No    Would you like a call back once the refill request has been completed: [x] Yes [] No    If the office needs to give you a call back, can they leave a voicemail: [x] Yes [] No    MARIANNE Frank   01/29/24 14:35 EST       "

## 2024-01-30 NOTE — TELEPHONE ENCOUNTER
I called pt and informed her that Dr. Badillo   Said to continue to taper off prednisone so he has decreased dose to 2mg daily.

## 2024-02-13 ENCOUNTER — TELEPHONE (OUTPATIENT)
Dept: FAMILY MEDICINE CLINIC | Facility: CLINIC | Age: 74
End: 2024-02-13

## 2024-02-13 NOTE — TELEPHONE ENCOUNTER
Caller: ZOIE BHATIA    Best call back number: [phone]     What was the call regarding: ON 02.12.24 PATIENT'S INR WAS 2.0

## 2024-02-14 RX ORDER — LEVOTHYROXINE SODIUM 0.05 MG/1
50 TABLET ORAL DAILY
Qty: 90 TABLET | Refills: 1 | Status: SHIPPED | OUTPATIENT
Start: 2024-02-14

## 2024-02-14 RX ORDER — DULOXETIN HYDROCHLORIDE 60 MG/1
60 CAPSULE, DELAYED RELEASE ORAL DAILY
Qty: 90 CAPSULE | Refills: 1 | Status: SHIPPED | OUTPATIENT
Start: 2024-02-14

## 2024-02-14 RX ORDER — FUROSEMIDE 20 MG/1
20 TABLET ORAL DAILY
Qty: 90 TABLET | Refills: 1 | Status: SHIPPED | OUTPATIENT
Start: 2024-02-14

## 2024-02-26 DIAGNOSIS — E03.9 ACQUIRED HYPOTHYROIDISM: ICD-10-CM

## 2024-02-26 DIAGNOSIS — I10 BENIGN ESSENTIAL HYPERTENSION: Primary | ICD-10-CM

## 2024-02-28 LAB
ALBUMIN SERPL-MCNC: 4 G/DL (ref 3.8–4.8)
ALBUMIN/CREAT UR: 17 MG/G CREAT (ref 0–29)
ALBUMIN/GLOB SERPL: 2.1 {RATIO} (ref 1.2–2.2)
ALP SERPL-CCNC: 61 IU/L (ref 44–121)
ALT SERPL-CCNC: 10 IU/L (ref 0–32)
APO B SERPL-MCNC: 52 MG/DL
AST SERPL-CCNC: 14 IU/L (ref 0–40)
BILIRUB SERPL-MCNC: 0.2 MG/DL (ref 0–1.2)
BUN SERPL-MCNC: 20 MG/DL (ref 8–27)
BUN/CREAT SERPL: 12 (ref 12–28)
CALCIUM SERPL-MCNC: 9.4 MG/DL (ref 8.7–10.3)
CHLORIDE SERPL-SCNC: 106 MMOL/L (ref 96–106)
CHOLEST SERPL-MCNC: 136 MG/DL (ref 100–199)
CO2 SERPL-SCNC: 23 MMOL/L (ref 20–29)
CREAT SERPL-MCNC: 1.61 MG/DL (ref 0.57–1)
CREAT UR-MCNC: 66.9 MG/DL
EGFRCR SERPLBLD CKD-EPI 2021: 33 ML/MIN/1.73
GLOBULIN SER CALC-MCNC: 1.9 G/DL (ref 1.5–4.5)
GLUCOSE SERPL-MCNC: 99 MG/DL (ref 70–99)
HDLC SERPL-MCNC: 58 MG/DL
LDLC SERPL CALC-MCNC: 48 MG/DL (ref 0–99)
LDLC/HDLC SERPL: 0.8 RATIO (ref 0–3.2)
MICROALBUMIN UR-MCNC: 11.5 UG/ML
POTASSIUM SERPL-SCNC: 4.7 MMOL/L (ref 3.5–5.2)
PROT SERPL-MCNC: 5.9 G/DL (ref 6–8.5)
SODIUM SERPL-SCNC: 141 MMOL/L (ref 134–144)
TRIGL SERPL-MCNC: 184 MG/DL (ref 0–149)
TSH SERPL DL<=0.005 MIU/L-ACNC: 5.18 UIU/ML (ref 0.45–4.5)
VLDLC SERPL CALC-MCNC: 30 MG/DL (ref 5–40)

## 2024-02-29 ENCOUNTER — OFFICE VISIT (OUTPATIENT)
Dept: FAMILY MEDICINE CLINIC | Facility: CLINIC | Age: 74
End: 2024-02-29
Payer: MEDICARE

## 2024-02-29 VITALS
WEIGHT: 293 LBS | SYSTOLIC BLOOD PRESSURE: 114 MMHG | DIASTOLIC BLOOD PRESSURE: 72 MMHG | HEART RATE: 70 BPM | RESPIRATION RATE: 16 BRPM | BODY MASS INDEX: 53.92 KG/M2 | HEIGHT: 62 IN

## 2024-02-29 DIAGNOSIS — G47.62 NOCTURNAL LEG CRAMPS: Primary | ICD-10-CM

## 2024-02-29 DIAGNOSIS — M25.552 PAIN OF LEFT HIP: ICD-10-CM

## 2024-02-29 DIAGNOSIS — J45.40 MODERATE PERSISTENT ASTHMA WITHOUT COMPLICATION: ICD-10-CM

## 2024-02-29 DIAGNOSIS — E78.2 MIXED HYPERLIPIDEMIA: ICD-10-CM

## 2024-02-29 PROCEDURE — 99214 OFFICE O/P EST MOD 30 MIN: CPT | Performed by: INTERNAL MEDICINE

## 2024-02-29 PROCEDURE — 3078F DIAST BP <80 MM HG: CPT | Performed by: INTERNAL MEDICINE

## 2024-02-29 PROCEDURE — 3074F SYST BP LT 130 MM HG: CPT | Performed by: INTERNAL MEDICINE

## 2024-02-29 RX ORDER — HYDROCODONE BITARTRATE AND ACETAMINOPHEN 10; 325 MG/1; MG/1
1 TABLET ORAL 3 TIMES DAILY
COMMUNITY
Start: 2024-02-15

## 2024-02-29 RX ORDER — ROPINIROLE 1 MG/1
1 TABLET, FILM COATED ORAL NIGHTLY
Qty: 90 TABLET | Refills: 3 | Status: SHIPPED | OUTPATIENT
Start: 2024-02-29

## 2024-02-29 RX ORDER — PREDNISONE 1 MG/1
2 TABLET ORAL DAILY
Qty: 180 TABLET | Refills: 0 | Status: SHIPPED | OUTPATIENT
Start: 2024-02-29

## 2024-02-29 NOTE — ASSESSMENT & PLAN NOTE
No changes to medication regimen at this time. Patient is a nonsmoker. She will try to schedule follow-up with pulm soon.

## 2024-02-29 NOTE — PROGRESS NOTES
Stan Badillo MD  Internal Medicine  [phone] (office)             02/29/2024    Patient Information  Alison Colvin                                                                                          [address] [facility] [place] [facility]  1950        Chief Complaint   Patient presents with    Results    Follow-up     Requesting a power mobility exam          History of Present Illness:    Alison Colvin is a 73 y.o. female who presents to the office for routine follow-up.     She reports dyspnea is stable, but is requesting to continue prednisone 2mg daily. She's not had recent follow-up  with pulmonology due to transportation issues.     Patient requesting new wheelchair as hers is falling apart.     She reports chronic L hip and groin pain.     Reports ropinirole 0.5mg QHS is helpful for RLS, but symptoms haven't totally resolved.   Health Maintenance Due   Topic Date Due    ZOSTER VACCINE (1 of 2) Never done    RSV Vaccine - Adults (1 - 1-dose 60+ series) Never done    COVID-19 Vaccine (4 - 2023-24 season) 09/01/2023        Allergies   Allergen Reactions    Pneumococcal Vaccines Delirium     Fever, chills    Asa [Aspirin]     Levofloxacin     Meperidine Other (See Comments)    Naproxen     Pregabalin Other (See Comments)    Propoxyphene     Sulfa Antibiotics Other (See Comments)           Current Outpatient Medications:     allopurinol (ZYLOPRIM) 100 MG tablet, TAKE 1 TABLET BY MOUTH  DAILY, Disp: 90 tablet, Rfl: 3    atorvastatin (LIPITOR) 40 MG tablet, Take 1 tablet by mouth Daily., Disp: 90 tablet, Rfl: 3    baclofen (LIORESAL) 10 MG tablet, Take 1 tablet by mouth 2 (Two) Times a Day., Disp: 180 tablet, Rfl: 1    BROVANA 15 MCG/2ML nebulizer solution, USE 1 VIAL PER NEBULIZER BID, Disp: , Rfl: 5    budesonide (PULMICORT) 1 MG/2ML nebulizer solution, Pulmicort SUSP; Patient Sig: Pulmicort SUSP USE 1 UNIT DOSE VIA NEBULIZER TWICE DAILY; 0; 25-Mar-2013; Active, Disp: , Rfl:      calcium carbonate (OS-SEBASTIÁN) 600 MG tablet, Take 1 tablet by mouth 2 (Two) Times a Day With Meals., Disp: , Rfl:     DULoxetine (CYMBALTA) 60 MG capsule, Take 1 capsule by mouth Daily., Disp: 90 capsule, Rfl: 1    furosemide (LASIX) 20 MG tablet, Take 1 tablet by mouth Daily., Disp: 90 tablet, Rfl: 1    HYDROcodone-acetaminophen (NORCO)  MG per tablet, Take 1 tablet by mouth 3 times a day., Disp: , Rfl:     ipratropium-albuterol (DUO-NEB) 0.5-2.5 mg/mL nebulizer, USE 1 VIAL PER NEBULIZER QID, Disp: , Rfl: 5    levothyroxine (SYNTHROID, LEVOTHROID) 50 MCG tablet, Take 1 tablet by mouth Daily., Disp: 90 tablet, Rfl: 1    metoclopramide (REGLAN) 10 MG tablet, TAKE 1 TABLET BY MOUTH 4  TIMES DAILY, Disp: 360 tablet, Rfl: 3    Multiple Vitamins-Minerals (WOMENS 50+ MULTI VITAMIN/MIN PO), Take 1 tablet by mouth Daily., Disp: , Rfl:     nystatin (MYCOSTATIN) 150318 UNIT/GM powder, Apply  topically to the appropriate area as directed Every 12 (Twelve) Hours., Disp: 15 g, Rfl: 0    OXYGEN-HELIUM IN, Inhale., Disp: , Rfl:     pantoprazole (PROTONIX) 40 MG EC tablet, TAKE 1 TABLET BY MOUTH  TWICE DAILY, Disp: 180 tablet, Rfl: 3    potassium chloride (K-DUR,KLOR-CON) 20 MEQ CR tablet, TAKE 1 TABLET BY MOUTH TWICE  DAILY, Disp: 180 tablet, Rfl: 3    predniSONE (DELTASONE) 1 MG tablet, Take 2 tablets by mouth Daily., Disp: 180 tablet, Rfl: 0    tiZANidine (ZANAFLEX) 4 MG tablet, Take 1 tablet by mouth 2 (Two) Times a Day., Disp: 180 tablet, Rfl: 1    topiramate (TOPAMAX) 25 MG tablet, TAKE 1 TABLET BY MOUTH  TWICE DAILY, Disp: 180 tablet, Rfl: 3    Umeclidinium-Vilanterol (Anoro Ellipta) 62.5-25 MCG/ACT aerosol powder  inhaler, Inhale 1 puff Daily., Disp: 60 each, Rfl: 11    warfarin (Coumadin) 1 MG tablet, Take 1 tablet twice a week in addition to her 4 mg daily, Disp: 30 tablet, Rfl: 5    warfarin (COUMADIN) 4 MG tablet, TAKE 1 TABLET BY MOUTH AT NIGHT  AS DIRECTED, Disp: 90 tablet, Rfl: 3    rOPINIRole (REQUIP) 1 MG  "tablet, Take 1 tablet by mouth Every Night. Take 1 hour before bedtime., Disp: 90 tablet, Rfl: 3      Social History     Socioeconomic History    Marital status:    Tobacco Use    Smoking status: Never    Smokeless tobacco: Never   Vaping Use    Vaping Use: Never used   Substance and Sexual Activity    Alcohol use: No    Drug use: No    Sexual activity: Defer         Vitals:    02/29/24 1014   BP: 114/72   BP Location: Right arm   Patient Position: Sitting   Cuff Size: Adult   Pulse: 70   Resp: 16   Weight: 136 kg (300 lb)   Height: 157.5 cm (62.01\")      Wt Readings from Last 3 Encounters:   02/29/24 136 kg (300 lb)   10/18/22 136 kg (300 lb)   09/27/22 136 kg (300 lb)     Body mass index is 54.86 kg/m².      Physical Exam  Vitals reviewed.   Constitutional:       Comments: Obese F in wheelchair, NAD   Pulmonary:      Effort: Pulmonary effort is normal.   Musculoskeletal:      Comments: Unable to exam hip due to being wheelchair bound   Neurological:      Mental Status: She is alert and oriented to person, place, and time.   Psychiatric:         Mood and Affect: Mood normal.         Behavior: Behavior normal.            Lab/other results:  Common labs          8/17/2023    08:38 2/26/2024    08:58   Common Labs   Glucose 107  99    BUN 15  20    Creatinine 1.51  1.61    Sodium 139  141    Potassium 4.5  4.7    Chloride 103  106    Calcium 9.6  9.4    Total Protein 6.2  5.9    Albumin 4.0  4.0    Total Bilirubin 0.3  0.2    Alkaline Phosphatase 65  61    AST (SGOT) 16  14    ALT (SGPT) 12  10    WBC 8.49     Hemoglobin 10.8     Hematocrit 33.3     Platelets 177     Total Cholesterol 213  136    Triglycerides 187  184    HDL Cholesterol 70  58    LDL Cholesterol  111  48    Microalbumin, Urine  11.5        Assessment/Plan:    Diagnoses and all orders for this visit:    1. Nocturnal leg cramps (Primary)  Assessment & Plan:  Leg cramps and RLS improving. Will increase ropinirole to 1mg QHS for now. "     Orders:  -     rOPINIRole (REQUIP) 1 MG tablet; Take 1 tablet by mouth Every Night. Take 1 hour before bedtime.  Dispense: 90 tablet; Refill: 3    2. Moderate persistent asthma without complication  Comments:  Will continue to try to taper off prednisone, continue Anoro Ellipta. Discussed potential side effects of chronic CS use. Follow-up in one month.  Assessment & Plan:  No changes to medication regimen at this time. Patient is a nonsmoker. She will try to schedule follow-up with pulm soon.     Orders:  -     predniSONE (DELTASONE) 1 MG tablet; Take 2 tablets by mouth Daily.  Dispense: 180 tablet; Refill: 0    3. Pain of left hip  Comments:  Unfortunately unable to examine hip today. She has history of osteoporosis and has been taking prednisone for years. Will check plain films.  Orders:  -     XR Hip With or Without Pelvis 2 - 3 View Left; Future    4. Mixed hyperlipidemia  Assessment & Plan:   Lipid abnormalities are improving with treatment    Plan:  Continue same medication/s without change.      Discussed medication dosage, use, side effects, and goals of treatment in detail.    Counseled patient on lifestyle modifications to help control hyperlipidemia.     Patient Treatment Goals:   LDL goal is under 100    Followup in 1 year.

## 2024-02-29 NOTE — ASSESSMENT & PLAN NOTE
Lipid abnormalities are improving with treatment    Plan:  Continue same medication/s without change.      Discussed medication dosage, use, side effects, and goals of treatment in detail.    Counseled patient on lifestyle modifications to help control hyperlipidemia.     Patient Treatment Goals:   LDL goal is under 100    Followup in 1 year.

## 2024-03-07 ENCOUNTER — TELEPHONE (OUTPATIENT)
Dept: FAMILY MEDICINE CLINIC | Facility: CLINIC | Age: 74
End: 2024-03-07
Payer: MEDICARE

## 2024-03-07 ENCOUNTER — HOSPITAL ENCOUNTER (OUTPATIENT)
Dept: GENERAL RADIOLOGY | Facility: HOSPITAL | Age: 74
Discharge: HOME | End: 2024-03-07
Admitting: INTERNAL MEDICINE
Payer: MEDICARE

## 2024-03-07 ENCOUNTER — TELEPHONE (OUTPATIENT)
Dept: FAMILY MEDICINE CLINIC | Facility: CLINIC | Age: 74
End: 2024-03-07

## 2024-03-07 DIAGNOSIS — M25.552 PAIN OF LEFT HIP: ICD-10-CM

## 2024-03-07 PROCEDURE — 73502 X-RAY EXAM HIP UNI 2-3 VIEWS: CPT

## 2024-03-07 NOTE — TELEPHONE ENCOUNTER
"Caller: Alison Colvin \"BK\"    Relationship to patient: Self    Best call back number: [phone]    Patient is needing: PATIENT WAS WANTING TO INFORM DR GOMEZ THAT A FORM SHOULD HAVE BEEN FAXED TO HIM THAT IS AN EVALUATION FORM FOR A MOBILITY CHAIR AND WAS JUST WANTING TO LET HIM KNOW TO KEEP ON FILE FOR HER FUTURE APPOINTMENT.      "

## 2024-03-13 ENCOUNTER — TELEPHONE (OUTPATIENT)
Dept: FAMILY MEDICINE CLINIC | Facility: CLINIC | Age: 74
End: 2024-03-13
Payer: MEDICARE

## 2024-03-13 NOTE — TELEPHONE ENCOUNTER
Hub to read: Unfortunately, Dr. Badillo is no longer with [facility]. Please schedule appointment with new PCP to establish care and address your medical concerns.

## 2024-03-20 ENCOUNTER — TELEPHONE (OUTPATIENT)
Dept: FAMILY MEDICINE CLINIC | Facility: CLINIC | Age: 74
End: 2024-03-20
Payer: MEDICARE

## 2024-03-20 NOTE — TELEPHONE ENCOUNTER
Radha with [facility] called patient INR is 1.8    I called spoke with patient she is currently taking 4mg Monday-Friday 8mg sat,sun.  Per last note Dr. Pat said to take 8mg Monday and Friday 4mg all other days. Patient did not follow how Dr. Pat said to take the warfarin and patient has been also eating lettuce.     Please advise on any changes

## 2024-03-21 DIAGNOSIS — G43.009 MIGRAINE WITHOUT AURA AND WITHOUT STATUS MIGRAINOSUS, NOT INTRACTABLE: Primary | ICD-10-CM

## 2024-03-21 RX ORDER — TOPIRAMATE 25 MG/1
25 TABLET ORAL 2 TIMES DAILY
Qty: 180 TABLET | Refills: 3 | Status: SHIPPED | OUTPATIENT
Start: 2024-03-21

## 2024-03-21 NOTE — TELEPHONE ENCOUNTER
Rx Refill Note  Requested Prescriptions     Pending Prescriptions Disp Refills    topiramate (TOPAMAX) 25 MG tablet 180 tablet 3     Sig: Take 1 tablet by mouth 2 (Two) Times a Day.      Last office visit with prescribing clinician: Visit date not found   Last telemedicine visit with prescribing clinician: Visit date not found   Next office visit with prescribing clinician: 4/4/2024                         Would you like a call back once the refill request has been completed: [] Yes [] No    If the office needs to give you a call back, can they leave a voicemail: [] Yes [] No    Emely Parson MA  03/21/24, 11:44 EDT

## 2024-03-26 ENCOUNTER — TELEPHONE (OUTPATIENT)
Dept: FAMILY MEDICINE CLINIC | Facility: CLINIC | Age: 74
End: 2024-03-26

## 2024-03-26 NOTE — TELEPHONE ENCOUNTER
Caller: CHRISTI    Relationship:     Best call back number: [phone]    What was the call regarding: INR FROM 3-25-24  2.4

## 2024-03-29 ENCOUNTER — TELEPHONE (OUTPATIENT)
Dept: FAMILY MEDICINE CLINIC | Facility: CLINIC | Age: 74
End: 2024-03-29

## 2024-03-29 NOTE — TELEPHONE ENCOUNTER
"  Caller: Alison Colvin \"BK\"    Relationship: Self    Best call back number: [phone]     What is the best time to reach you: ANYTIME    What was the call regarding: PATIENT STATED PAPERWORK FOR HER TO RECEIVE A HOVERROUND WAS FAXED FROM THE [facility] FOR DR GOMEZ BEFORE HE OFFBOARDED.    PATIENT IS REQUESTING TO KNOW IF THIS PAPERWORK IS STILL IN HER FILE, OR IF THIS SHOULD BVE FAXED AGAIN FOR DR GRAFF TO COMPLETE.    PLEASE CALL TO DISCUSS AND ADVISE.  "

## 2024-04-02 NOTE — TELEPHONE ENCOUNTER
"  Caller: Alison Colvin \"BK\"    Relationship: Self    Best call back number: [phone]     What was the call regarding: PATIENT IS CALLING TO SEE IF DR SEGAL EVER RECEIVED THE PAPERWORK FOR THE HOVEROUND   "

## 2024-04-04 ENCOUNTER — OFFICE VISIT (OUTPATIENT)
Dept: FAMILY MEDICINE CLINIC | Facility: CLINIC | Age: 74
End: 2024-04-04
Payer: MEDICARE

## 2024-04-04 VITALS
RESPIRATION RATE: 18 BRPM | OXYGEN SATURATION: 94 % | TEMPERATURE: 97.5 F | BODY MASS INDEX: 35.7 KG/M2 | HEIGHT: 62 IN | DIASTOLIC BLOOD PRESSURE: 70 MMHG | HEART RATE: 60 BPM | WEIGHT: 194 LBS | SYSTOLIC BLOOD PRESSURE: 134 MMHG

## 2024-04-04 DIAGNOSIS — Z79.01 ON WARFARIN THERAPY: ICD-10-CM

## 2024-04-04 DIAGNOSIS — E78.2 MIXED HYPERLIPIDEMIA: ICD-10-CM

## 2024-04-04 DIAGNOSIS — N18.32 CHRONIC RENAL IMPAIRMENT, STAGE 3B: ICD-10-CM

## 2024-04-04 DIAGNOSIS — E03.9 ACQUIRED HYPOTHYROIDISM: ICD-10-CM

## 2024-04-04 DIAGNOSIS — Z76.89 ENCOUNTER FOR POWER MOBILITY DEVICE ASSESSMENT: Primary | ICD-10-CM

## 2024-04-04 DIAGNOSIS — K21.9 GASTROESOPHAGEAL REFLUX DISEASE, UNSPECIFIED WHETHER ESOPHAGITIS PRESENT: ICD-10-CM

## 2024-04-04 RX ORDER — PANTOPRAZOLE SODIUM 40 MG/1
40 TABLET, DELAYED RELEASE ORAL DAILY
Qty: 90 TABLET | Refills: 2 | Status: SHIPPED | OUTPATIENT
Start: 2024-04-04

## 2024-04-04 RX ORDER — LEVOTHYROXINE SODIUM 0.07 MG/1
75 TABLET ORAL DAILY
Qty: 90 TABLET | Refills: 2 | Status: SHIPPED | OUTPATIENT
Start: 2024-04-04

## 2024-04-04 NOTE — ASSESSMENT & PLAN NOTE
INR 2.0. Maintaining target goal of 2-3  Continue warfarin therapy 4 mg every MWF and 1 mg every other day as prescribed

## 2024-04-04 NOTE — ASSESSMENT & PLAN NOTE
Lab review indicates elevated TSH from previous value  Will increase levothyroxine to 75 mcg daily  Advice patient to take medication on an empty to ensure adequate absorption  To be reassessed in 6 months
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
## 2024-04-04 NOTE — ASSESSMENT & PLAN NOTE
Renal condition is stable.  Continue current treatment regimen.  Avoid nephrotoxic drugs such as NSAIDs.  Renal condition will be reassessed in 6 months.

## 2024-04-04 NOTE — ASSESSMENT & PLAN NOTE
Patient has severe spondylotic changes and degenerative interventricular discs of the lumbosacral spine resulting in lumbar stenosis with radiculopathy pain of the lower extremities preventing mobility.    PMD is necessary to enable patient to ambulate and perform her MR ADLs such as going to the bathroom to bathe, going to the kitchen to prepare her meals and going to the bedroom to dress up.    Patient cannot use cane or walker due to poor balance with history of falls and RLD of 2/5 & LLE of 2/5.  Patient cannot use a manual wheelchair due to weakness in the upper extremity [RUE 1/5, LUE 1/5]  Patient cannot use a scooter [POV] due to lack of postural stability, always rocking back-and-forth due to pain in the back    Patient can safely operate the power mobility device both mentally and physically as she has done in the past.  Patient is willing and motivated to use the power mobility device in her home.

## 2024-04-04 NOTE — ASSESSMENT & PLAN NOTE
Lipid abnormalities are improving with treatment    Plan:  Continue same medication/s without change.  Atorvastatin 40 mg daily  Counseled patient on lifestyle modifications to help control hyperlipidemia.     Patient Treatment Goals:   LDL goal less than 70    Followup in 6 months.

## 2024-04-05 ENCOUNTER — TELEPHONE (OUTPATIENT)
Dept: FAMILY MEDICINE CLINIC | Facility: CLINIC | Age: 74
End: 2024-04-05

## 2024-04-11 DIAGNOSIS — M79.7 FIBROMYALGIA: ICD-10-CM

## 2024-04-11 DIAGNOSIS — J45.40 MODERATE PERSISTENT ASTHMA WITHOUT COMPLICATION: ICD-10-CM

## 2024-04-11 NOTE — TELEPHONE ENCOUNTER
"  Caller: Marii Alison WILLIAM \"BK\"    Relationship: Self    Best call back number: [phone]    Requested Prescriptions:   Requested Prescriptions     Pending Prescriptions Disp Refills    tiZANidine (ZANAFLEX) 4 MG tablet 180 tablet 1     Sig: Take 1 tablet by mouth 2 (Two) Times a Day.    predniSONE (DELTASONE) 1 MG tablet 180 tablet 0     Sig: Take 2 tablets by mouth Daily.        Pharmacy where request should be sent: [facility] ([facility]) - [place] [address] [facility] [phone] [facility] [phone] FX     Last office visit with prescribing clinician: 4/4/2024   Last telemedicine visit with prescribing clinician: Visit date not found   Next office visit with prescribing clinician: 9/5/2024     Additional details provided by patient: STATED THAT THERE SHOULD BE A FAX COMING FROM THE PHARMACY FOR THE REST OF THE MEDICATIONS BUT THESE ARE THE CLOSEST TO BEING OUT. STATED THAT THEY DO HAVE ABOUT 2 WEEKS OF THE MEDICATION REMAINING. STATED THAT THEY HAD INFORMED THEM THEY WERE GOING TO BE DOING 100 DAY SUPPLIES INSTEAD OF THE 90.    Does the patient have less than a 3 day supply:  [] Yes  [x] No    Would you like a call back once the refill request has been completed: [] Yes [x] No    If the office needs to give you a call back, can they leave a voicemail: [] Yes [x] No    Dot Bowling Rep   04/11/24 13:09 EDT       "

## 2024-04-15 DIAGNOSIS — M79.7 FIBROMYALGIA: ICD-10-CM

## 2024-04-15 RX ORDER — PREDNISONE 1 MG/1
2 TABLET ORAL DAILY
Qty: 180 TABLET | Refills: 0 | OUTPATIENT
Start: 2024-04-15

## 2024-04-15 RX ORDER — TIZANIDINE 4 MG/1
4 TABLET ORAL 2 TIMES DAILY
Qty: 180 TABLET | Refills: 1 | OUTPATIENT
Start: 2024-04-15

## 2024-04-15 RX ORDER — BACLOFEN 10 MG/1
10 TABLET ORAL 2 TIMES DAILY
Qty: 180 TABLET | Refills: 1 | Status: SHIPPED | OUTPATIENT
Start: 2024-04-15

## 2024-04-15 RX ORDER — METOCLOPRAMIDE 10 MG/1
10 TABLET ORAL 4 TIMES DAILY
Qty: 360 TABLET | Refills: 3 | Status: SHIPPED | OUTPATIENT
Start: 2024-04-15

## 2024-04-15 NOTE — TELEPHONE ENCOUNTER
Rx Refill Note  Requested Prescriptions     Pending Prescriptions Disp Refills    metoclopramide (REGLAN) 10 MG tablet 360 tablet 3     Sig: Take 1 tablet by mouth 4 (Four) Times a Day.    baclofen (LIORESAL) 10 MG tablet 180 tablet 1     Sig: Take 1 tablet by mouth 2 (Two) Times a Day.      Last office visit with prescribing clinician: 4/4/2024   Last telemedicine visit with prescribing clinician: Visit date not found   Next office visit with prescribing clinician: 9/5/2024                         Would you like a call back once the refill request has been completed: [] Yes [] No    If the office needs to give you a call back, can they leave a voicemail: [] Yes [] No    Emely Parson MA  04/15/24, 08:52 EDT

## 2024-04-18 ENCOUNTER — TELEPHONE (OUTPATIENT)
Dept: FAMILY MEDICINE CLINIC | Facility: CLINIC | Age: 74
End: 2024-04-18

## 2024-04-18 NOTE — TELEPHONE ENCOUNTER
Caller: JEREMY    Relationship: Other/[facility]    Best call back number:     [phone]       What was the call regarding:       THE OFFICE OF [facility] HAS FAXED OVER PAPERWORK THAT THEY NEED CLARIFICATION ON.  IN THE MEDICAL RECORDS THAT WAS FAXED OVER THERE WAS DISCREPANCY IN THE PATIENT'S WEIGHT AND THEY NEED THAT TO BE CLARIFIED.    THE PATIENTS WEIGHT WILL EFFECT THE CHAIR THAT THE PATIENT GETS. AND THEY WANT TO ENSURE SHE HAS THE PROPER EQUIPMENT.      PLEASE ADVISE

## 2024-04-22 ENCOUNTER — TELEPHONE (OUTPATIENT)
Dept: FAMILY MEDICINE CLINIC | Facility: CLINIC | Age: 74
End: 2024-04-22

## 2024-04-22 NOTE — TELEPHONE ENCOUNTER
"Caller: Alison Colvin \"BK\"    Relationship: Self    Best call back number:     [phone]       Requested Prescriptions:   tiZANidine (ZANAFLEX) 4 MG tablet [11085] (Order [id number])      predniSONE (DELTASONE) 1 MG tablet          Pharmacy where request should be sent:    [facility] ([facility] - [place] [address] [facility] [phone] [facility] [phone]      Last office visit with prescribing clinician: 4/4/2024   Last telemedicine visit with prescribing clinician: Visit date not found   Next office visit with prescribing clinician: 9/5/2024     Additional details provided by patienT  PATIENT REQUESTING THE TIZANIDINE FOR HER MUSCLE SPASM    PREDNISONE IS FOR COPD     Does the patient have less than a 3 day supply:  [] Yes  [x] No    Would you like a call back once the refill request has been completed: [] Yes [] No    If the office needs to give you a call back, can they leave a voicemail: [] Yes [] No    Antonio Oneal   04/22/24 15:40 EDT            "

## 2024-04-23 ENCOUNTER — TELEPHONE (OUTPATIENT)
Dept: FAMILY MEDICINE CLINIC | Facility: CLINIC | Age: 74
End: 2024-04-23

## 2024-04-23 DIAGNOSIS — J45.40 MODERATE PERSISTENT ASTHMA WITHOUT COMPLICATION: ICD-10-CM

## 2024-04-23 NOTE — TELEPHONE ENCOUNTER
Caller: [facility]    Relationship: [facility]    Best call back number: [phone]     REFERENCE NUMBER:[id number]    What is the best time to reach you: M,TUE 8:30-7 W,THU, FRI 8:30-6    Who are you requesting to speak with (clinical staff, provider,  specific staff member): CLINICAL    What was the call regarding: PRETTY FROM [facility] STATED THAT THEY RECEIVED OFFICE NOTED FROM 2/29/24 THAT STATED PATIENT  POUNDS BUT THEN THEY RECEIVED OFFICE NOTES AND PRESCRIPTION FROM 4/4/24 THAT STATED PATIENT  POUNDS. PRETTY IS REQUESTING TO KNOW WHICH WEIGHT IS CORRECT AND FOR IT TO BE UPDATED. SHE STATED THAT A LINE THROUGH THE INCORRECT DATE AND INITIALED AND DATED WITH THE CORRECT WEIGHT ADDED CAN BE SENT BACK OVER TO THEM.PLEASE ADVISE.

## 2024-04-23 NOTE — TELEPHONE ENCOUNTER
Rx Refill Note  Requested Prescriptions     Pending Prescriptions Disp Refills    predniSONE (DELTASONE) 1 MG tablet 180 tablet 0     Sig: Take 2 tablets by mouth Daily.      Last office visit with prescribing clinician: 4/4/2024   Last telemedicine visit with prescribing clinician: Visit date not found   Next office visit with prescribing clinician: 9/5/2024                         Would you like a call back once the refill request has been completed: [] Yes [] No    If the office needs to give you a call back, can they leave a voicemail: [] Yes [] No    Emely Parson MA  04/23/24, 09:44 EDT

## 2024-04-24 ENCOUNTER — TELEPHONE (OUTPATIENT)
Dept: FAMILY MEDICINE CLINIC | Facility: CLINIC | Age: 74
End: 2024-04-24

## 2024-04-24 RX ORDER — PREDNISONE 1 MG/1
2 TABLET ORAL DAILY
Qty: 180 TABLET | Refills: 0 | OUTPATIENT
Start: 2024-04-24

## 2024-04-24 NOTE — TELEPHONE ENCOUNTER
Caller: [facility]    Relationship:     Best call back number: [phone]    What was the call regarding: REPORTING INR READING 4.0, TESTED ON 4/23/24

## 2024-04-26 NOTE — TELEPHONE ENCOUNTER
Spoke with Indigo at [facility] and let her know that the April weight is correct

## 2024-04-26 NOTE — TELEPHONE ENCOUNTER
Hub staff attempted to follow warm transfer process and was unsuccessful     [facility]    Relationship to patient:     Best call back number: DID NOT LEAVE A CALLBACK NUMBER SAID THEY WOULD CALL MONDAY     Patient is needing: RETURNING A CALL TO [facility]

## 2024-04-29 ENCOUNTER — TELEPHONE (OUTPATIENT)
Dept: FAMILY MEDICINE CLINIC | Facility: CLINIC | Age: 74
End: 2024-04-29

## 2024-04-29 NOTE — TELEPHONE ENCOUNTER
UNABLE TO WARM TRANSFER     Caller: ALISON SIMMONS    Relationship to patient: Other    Best call back number: [phone]     HOVER[facility] IS CONFIRMING IF DELIVERY DOCUMENT HAS BEEN RECEIVED    ALISON RICE DOCUMENT WAS FAX OVER 4/26/24    REFERENCE NUMBER [id number]

## 2024-04-30 ENCOUNTER — TELEPHONE (OUTPATIENT)
Dept: FAMILY MEDICINE CLINIC | Facility: CLINIC | Age: 74
End: 2024-04-30

## 2024-04-30 NOTE — TELEPHONE ENCOUNTER
[facility]    Relationship: Other    Best call back number: [phone]     What is the best time to reach you: 8:30 - 6:00 EST    REFERENCE NUMBER: [phone]    Do you know the name of the person who called: BK    What was the call regarding: BK STATED PAPERWORK WAS FAXED ON 04- AND ON 04- FOR THE PATIENTS POWER CHAIR.    BK IS REQUESTING TO KNOW IF THIS HAS BEEN RECEIVED, AND IS REQUESTING THIS BE SIGNED AND COMPLETED BY DR GRAFF, THEN RETURNED.    RETURN FAX [phone].

## 2024-05-01 ENCOUNTER — TELEPHONE (OUTPATIENT)
Dept: FAMILY MEDICINE CLINIC | Facility: CLINIC | Age: 74
End: 2024-05-01

## 2024-05-01 NOTE — TELEPHONE ENCOUNTER
Caller: MD IN[facility]    Relationship: [facility]    Best call back number: [phone]    What was the call regarding: CALLING TO REPORT INR READING FOR TODAY (5/1/24), 2.0.

## 2024-05-02 NOTE — TELEPHONE ENCOUNTER
"Caller: Alison Colvin \"BK\"    Relationship: Self    Best call back number: [phone]     What was the call regarding: PATIENT CALLED TO FOLLOW UP ON STATUS OF PAPERWORK. SHE WAS TOLD BY [facility] THAT THEY HAD FAXED A FORM FOR DR GRAFF AGAIN TODAY. PLEASE CALL TO CONFIRM    "

## 2024-05-07 DIAGNOSIS — E03.9 ACQUIRED HYPOTHYROIDISM: ICD-10-CM

## 2024-05-07 RX ORDER — LEVOTHYROXINE SODIUM 0.07 MG/1
75 TABLET ORAL DAILY
Qty: 90 TABLET | Refills: 2 | Status: SHIPPED | OUTPATIENT
Start: 2024-05-07

## 2024-05-15 DIAGNOSIS — J45.40 MODERATE PERSISTENT ASTHMA WITHOUT COMPLICATION: ICD-10-CM

## 2024-05-15 RX ORDER — PREDNISONE 1 MG/1
2 TABLET ORAL DAILY
Qty: 180 TABLET | Refills: 0 | OUTPATIENT
Start: 2024-05-15

## 2024-05-15 NOTE — TELEPHONE ENCOUNTER
"Caller: Alison Colvin \"BK\"    Relationship: Self    Best call back number: [phone]    Requested Prescriptions:   Requested Prescriptions     Pending Prescriptions Disp Refills    predniSONE (DELTASONE) 1 MG tablet 180 tablet 0     Sig: Take 2 tablets by mouth Daily.        Pharmacy where request should be sent: [facility] ([facility] [place] [address] [facility] [phone] [facility] [phone]      Last office visit with prescribing clinician: 4/4/2024   Last telemedicine visit with prescribing clinician: Visit date not found   Next office visit with prescribing clinician: 9/5/2024     Does the patient have less than a 3 day supply:  [] Yes  [x] No    Dot Yepez Rep   05/15/24 11:49 EDT         "

## 2024-05-28 DIAGNOSIS — J45.40 MODERATE PERSISTENT ASTHMA WITHOUT COMPLICATION: ICD-10-CM

## 2024-05-28 NOTE — TELEPHONE ENCOUNTER
"  Caller: Alison Colvin \"BK\"    Relationship: Self    Best call back number: [phone]     Requested Prescriptions:   Requested Prescriptions     Pending Prescriptions Disp Refills    predniSONE (DELTASONE) 1 MG tablet 180 tablet 0     Sig: Take 2 tablets by mouth Daily.        Pharmacy where request should be sent: [address] [phone] [facility] [phone]      Last office visit with prescribing clinician: 4/4/2024   Last telemedicine visit with prescribing clinician: Visit date not found   Next office visit with prescribing clinician: 9/5/2024       PULMONARY ADVISED THAT SHE SHOULD NOT GO OFF THIS MEDICATION!      PATIENT WISHES TO BE NOTIFIED WHEN MEDICATION IS FAXED OR CALLED IN, PLEASE.    Does the patient have less than a 3 day supply:  [] Yes  [x] No    Dot Odom Rep   05/28/24 10:54 EDT       "

## 2024-05-29 ENCOUNTER — HOSPITAL ENCOUNTER (OUTPATIENT)
Dept: BONE DENSITY | Facility: HOSPITAL | Age: 74
Discharge: HOME | End: 2024-05-29
Admitting: INTERNAL MEDICINE
Payer: MEDICARE

## 2024-05-29 ENCOUNTER — HOSPITAL ENCOUNTER (OUTPATIENT)
Dept: MAMMOGRAPHY | Facility: HOSPITAL | Age: 74
End: 2024-05-29
Payer: MEDICARE

## 2024-05-29 DIAGNOSIS — M81.0 AGE-RELATED OSTEOPOROSIS WITHOUT CURRENT PATHOLOGICAL FRACTURE: ICD-10-CM

## 2024-05-29 PROCEDURE — 77080 DXA BONE DENSITY AXIAL: CPT

## 2024-05-29 RX ORDER — PREDNISONE 1 MG/1
2 TABLET ORAL DAILY
Qty: 180 TABLET | Refills: 0 | OUTPATIENT
Start: 2024-05-29

## 2024-05-31 DIAGNOSIS — J45.40 MODERATE PERSISTENT ASTHMA WITHOUT COMPLICATION: ICD-10-CM

## 2024-05-31 RX ORDER — PREDNISONE 1 MG/1
2 TABLET ORAL DAILY
Qty: 180 TABLET | Refills: 0 | OUTPATIENT
Start: 2024-05-31

## 2024-05-31 NOTE — TELEPHONE ENCOUNTER
Not appropriate to refill. Prolonged systemic steroid is not safe & not an indication for COPD treatment. She is already on inhaled corticosteroid for her that.

## 2024-05-31 NOTE — TELEPHONE ENCOUNTER
"Caller: Alison Colvin \"BK\"    Relationship: Self    Best call back number: [phone]    Requested Prescriptions:   Requested Prescriptions     Pending Prescriptions Disp Refills    predniSONE (DELTASONE) 1 MG tablet 180 tablet 0     Sig: Take 2 tablets by mouth Daily.        Pharmacy where request should be sent: [address] [phone] [facility] [phone]      Last office visit with prescribing clinician: 4/4/2024   Last telemedicine visit with prescribing clinician: Visit date not found   Next office visit with prescribing clinician: 9/5/2024     Additional details provided by patient: PATIENT STATED THAT THE MEDICATION IS USED FOR HER COPD AND SHE HAS ABOUT A WEEK OF THE MEDICATION LEFT    Does the patient have less than a 3 day supply:  [] Yes  [x] No    Would you like a call back once the refill request has been completed: [x] Yes [] No    If the office needs to give you a call back, can they leave a voicemail: [x] Yes [] No    Dot Sotomayor Rep   05/31/24 08:47 EDT     "

## 2024-06-03 DIAGNOSIS — J45.40 MODERATE PERSISTENT ASTHMA WITHOUT COMPLICATION: ICD-10-CM

## 2024-06-03 RX ORDER — PREDNISONE 1 MG/1
2 TABLET ORAL DAILY
Qty: 180 TABLET | Refills: 0 | Status: SHIPPED | OUTPATIENT
Start: 2024-06-03

## 2024-06-11 ENCOUNTER — TELEPHONE (OUTPATIENT)
Dept: FAMILY MEDICINE CLINIC | Facility: CLINIC | Age: 74
End: 2024-06-11
Payer: MEDICARE

## 2024-06-11 NOTE — TELEPHONE ENCOUNTER
"Relay     \"Please inform patient that her dexa scan indicate worsening bone density. She will need to be on medication for osteoporosis. She should schedule an appointment to discuss options for therapy. \"                  "

## 2024-06-12 ENCOUNTER — HOSPITAL ENCOUNTER (OUTPATIENT)
Dept: MAMMOGRAPHY | Facility: HOSPITAL | Age: 74
Discharge: HOME | End: 2024-06-12
Admitting: INTERNAL MEDICINE
Payer: MEDICARE

## 2024-06-12 PROCEDURE — 77063 BREAST TOMOSYNTHESIS BI: CPT

## 2024-06-12 PROCEDURE — 77067 SCR MAMMO BI INCL CAD: CPT

## 2024-07-05 ENCOUNTER — TELEPHONE (OUTPATIENT)
Dept: FAMILY MEDICINE CLINIC | Facility: CLINIC | Age: 74
End: 2024-07-05
Payer: MEDICARE

## 2024-07-05 DIAGNOSIS — M1A.9XX1 GOUT WITH TOPHUS: ICD-10-CM

## 2024-07-05 DIAGNOSIS — G47.62 NOCTURNAL LEG CRAMPS: ICD-10-CM

## 2024-07-05 RX ORDER — ROPINIROLE 1 MG/1
2 TABLET, FILM COATED ORAL NIGHTLY
Qty: 180 TABLET | Refills: 3 | Status: SHIPPED | OUTPATIENT
Start: 2024-07-05 | End: 2024-07-05 | Stop reason: SDUPTHER

## 2024-07-05 RX ORDER — ROPINIROLE 1 MG/1
2 TABLET, FILM COATED ORAL NIGHTLY
Qty: 180 TABLET | Refills: 3 | Status: SHIPPED | OUTPATIENT
Start: 2024-07-05

## 2024-07-05 RX ORDER — ALLOPURINOL 100 MG/1
100 TABLET ORAL DAILY
Qty: 90 TABLET | Refills: 3 | Status: SHIPPED | OUTPATIENT
Start: 2024-07-05

## 2024-07-05 NOTE — TELEPHONE ENCOUNTER
Rx Refill Note  Requested Prescriptions     Pending Prescriptions Disp Refills    allopurinol (ZYLOPRIM) 100 MG tablet 90 tablet 3     Sig: Take 1 tablet by mouth Daily.      Last office visit with prescribing clinician: 4/4/2024   Last telemedicine visit with prescribing clinician: Visit date not found   Next office visit with prescribing clinician: 9/5/2024                         Would you like a call back once the refill request has been completed: [] Yes [] No    If the office needs to give you a call back, can they leave a voicemail: [] Yes [] No    Emely Parson MA  07/05/24, 13:40 EDT

## 2024-07-05 NOTE — TELEPHONE ENCOUNTER
"Caller: Alison Colvin \"BK\"    Relationship: Self    Best call back number: [phone]     What medication are you requesting: rOPINIRole (REQUIP) 1 MG tablet    What are your current symptoms: PAIN IN LEGS    Have you had these symptoms before:    [x] Yes  [] No    Have you been treated for these symptoms before:   [x] Yes  [] No    If a prescription is needed, what is your preferred pharmacy and phone number: [facility] - [place], [place] - [address] [facility] [phone] [facility] [phone] FX     Additional notes: PATIENT STATED SHE WAS ADVISED BY HER PREVIOUS PROVIDERS, DR DAILEY AND DR GOMEZ, THAT IF HER LEGS BOTHERED HER TOO MUCH, SHE SHOULD TAKE TWO TABLETS AT NIGHT BEFORE BED RATHER THAN JUST ONE.    PATIENT STATED HER LEGS HAVE BEEN BOTHERING HER GREATLY, AND SHE WOULD LIKE DR GRAFF TO CHANGE HER PRESCRIPTION TO TAKING TWO TABLETS AT NIGHT.    PATIENT STATED SHE CURRENTLY HAS 2 NIGHTS REMAINING OF THIS MEDICATION, AND 7 PILLS REMAINING FOR NEXT WEEK.    PATIENT STATED HER PHARMACY SENT A FAX TO REQUEST A REFILL, HOWEVER THIS DID NOT INCLUDE HER REQUEST FOR THE INCREASED DOSAGE.    PLEASE CALL TO INFORM PATIENT IF THIS MAY BE DONE OR NOT.    "

## 2024-07-08 ENCOUNTER — TELEPHONE (OUTPATIENT)
Dept: FAMILY MEDICINE CLINIC | Facility: CLINIC | Age: 74
End: 2024-07-08

## 2024-07-08 NOTE — TELEPHONE ENCOUNTER
Spoke with patient. She hasn't had a covid test, and can't make it in to be seen do to her  being just as sick as her. She doesn't have mychart said she would try to set it up and call back. Pt okay to be scheduled for video visit if possible. Patient said she will send me a message once it's hooked up or call me back.

## 2024-07-08 NOTE — TELEPHONE ENCOUNTER
"Caller: Alison Colvin \"BK\"    Relationship: Self    Best call back number: [phone]     What medication are you requesting: ANTIBIOTIC    What are your current symptoms: COUGH / CHILLS / BODY ACHES / FATIGUE    How long have you been experiencing symptoms: STARTED 07/05/24    Have you had these symptoms before:    [] Yes  [x] No    Have you been treated for these symptoms before:   [] Yes  [x] No    If a prescription is needed, what is your preferred pharmacy and phone number: [id number] - [place], [place] - [address] - [phone]  - [phone]      Additional notes: PLEASE ADVISE        "

## 2024-07-10 NOTE — TELEPHONE ENCOUNTER
"Caller: Alison Colvin \"BK\"    Relationship to patient: Self    Best call back number: [phone]    Patient is needing: PATIENT CALLED AND SAID SHE WENT TO URGENT CARE YESTERDAY, 07/09/24 AND WAS GIVEN ANTIBIOTICS     SHE WANTED TO LET DR. GRAFF KNOW, SHE SAID THE URGENT CARE WAS CONCERNED BECAUSE THEY SAID SHE WAS CLOSE TO BECOMING SEPTIC BECAUSE OF GREEN PHLEGM   "

## 2024-07-11 ENCOUNTER — OFFICE VISIT (OUTPATIENT)
Dept: FAMILY MEDICINE CLINIC | Facility: CLINIC | Age: 74
End: 2024-07-11
Payer: MEDICARE

## 2024-07-11 VITALS
HEIGHT: 62 IN | RESPIRATION RATE: 16 BRPM | OXYGEN SATURATION: 93 % | TEMPERATURE: 98.5 F | BODY MASS INDEX: 35.47 KG/M2 | SYSTOLIC BLOOD PRESSURE: 118 MMHG | DIASTOLIC BLOOD PRESSURE: 56 MMHG | HEART RATE: 87 BPM

## 2024-07-11 DIAGNOSIS — B37.0 ORAL THRUSH: Primary | ICD-10-CM

## 2024-07-11 PROCEDURE — 99213 OFFICE O/P EST LOW 20 MIN: CPT | Performed by: INTERNAL MEDICINE

## 2024-07-11 PROCEDURE — 3074F SYST BP LT 130 MM HG: CPT | Performed by: INTERNAL MEDICINE

## 2024-07-11 PROCEDURE — 3078F DIAST BP <80 MM HG: CPT | Performed by: INTERNAL MEDICINE

## 2024-07-11 PROCEDURE — 1125F AMNT PAIN NOTED PAIN PRSNT: CPT | Performed by: INTERNAL MEDICINE

## 2024-07-11 RX ORDER — CEFDINIR 300 MG/1
600 CAPSULE ORAL
COMMUNITY
Start: 2024-07-09 | End: 2024-07-16

## 2024-07-11 NOTE — PROGRESS NOTES
Chief Complaint   Patient presents with    Fever    Cough    Chills    Thrush    Headache    Generalized Body Aches   History of Present Illness:  Patient is here today for follow up of URI. She had flu-like symptoms few days ago and was seen at [facility]. She reports to be getting better however she has been having painful oral thrush for the past few day, associated with blisters which has been making it difficult for her to eat/drink. No difficulty swallowing or pain with swallowing.    PMH:   Outpatient Medications Prior to Visit   Medication Sig Dispense Refill    cefdinir (OMNICEF) 300 MG capsule Take 2 capsules by mouth.      allopurinol (ZYLOPRIM) 100 MG tablet Take 1 tablet by mouth Daily. 90 tablet 3    atorvastatin (LIPITOR) 40 MG tablet Take 1 tablet by mouth Daily. 90 tablet 3    baclofen (LIORESAL) 10 MG tablet Take 1 tablet by mouth 2 (Two) Times a Day. 180 tablet 1    BROVANA 15 MCG/2ML nebulizer solution USE 1 VIAL PER NEBULIZER BID  5    budesonide (PULMICORT) 1 MG/2ML nebulizer solution Pulmicort SUSP; Patient Sig: Pulmicort SUSP USE 1 UNIT DOSE VIA NEBULIZER TWICE DAILY; 0; 25-Mar-2013; Active      calcium carbonate (OS-SEBASTIÁN) 600 MG tablet Take 1 tablet by mouth 2 (Two) Times a Day With Meals.      DULoxetine (CYMBALTA) 60 MG capsule Take 1 capsule by mouth Daily. 90 capsule 1    furosemide (LASIX) 20 MG tablet Take 1 tablet by mouth Daily. 90 tablet 1    HYDROcodone-acetaminophen (NORCO)  MG per tablet Take 1 tablet by mouth 3 times a day.      ipratropium-albuterol (DUO-NEB) 0.5-2.5 mg/mL nebulizer USE 1 VIAL PER NEBULIZER QID  5    levothyroxine (SYNTHROID, LEVOTHROID) 75 MCG tablet Take 1 tablet by mouth Daily. 90 tablet 2    metoclopramide (REGLAN) 10 MG tablet Take 1 tablet by mouth 4 (Four) Times a Day. 360 tablet 3    Multiple Vitamins-Minerals (WOMENS 50+ MULTI VITAMIN/MIN PO) Take 1 tablet by mouth Daily.      nystatin (MYCOSTATIN) 013190 UNIT/GM powder Apply   "topically to the appropriate area as directed Every 12 (Twelve) Hours. 15 g 0    OXYGEN-HELIUM IN Inhale.      pantoprazole (PROTONIX) 40 MG EC tablet Take 1 tablet by mouth Daily. 90 tablet 2    potassium chloride (K-DUR,KLOR-CON) 20 MEQ CR tablet TAKE 1 TABLET BY MOUTH TWICE  DAILY 180 tablet 3    predniSONE (DELTASONE) 1 MG tablet Take 2 tablets by mouth Daily. 180 tablet 0    rOPINIRole (REQUIP) 1 MG tablet Take 2 tablets by mouth Every Night. Take 1 hour before bedtime. 180 tablet 3    topiramate (TOPAMAX) 25 MG tablet Take 1 tablet by mouth 2 (Two) Times a Day. 180 tablet 3    Umeclidinium-Vilanterol (Anoro Ellipta) 62.5-25 MCG/ACT aerosol powder  inhaler Inhale 1 puff Daily. 60 each 11    warfarin (Coumadin) 1 MG tablet Take 1 tablet twice a week in addition to her 4 mg daily 30 tablet 5    warfarin (COUMADIN) 4 MG tablet TAKE 1 TABLET BY MOUTH AT NIGHT  AS DIRECTED 90 tablet 3     No facility-administered medications prior to visit.      Allergies   Allergen Reactions    Pneumococcal Vaccines Delirium     Fever, chills    Asa [Aspirin]     Levofloxacin     Meperidine Other (See Comments)    Naproxen     Pregabalin Other (See Comments)    Propoxyphene     Sulfa Antibiotics Other (See Comments)     Past Surgical History:   Procedure Laterality Date    CATARACT EXTRACTION      COLON SURGERY      COLONOSCOPY      ENDOSCOPY N/A 6/17/2016    Procedure: ESOPHAGOGASTRODUODENOSCOPY with biopsy and viral culture;  Surgeon: Presley Cornelius MD;  Location: [facility] ENDOSCOPY;  Service:     HIP SURGERY      HYSTERECTOMY       family history includes Cancer in her brother and mother; Other in her father.   reports that she has never smoked. She has never been exposed to tobacco smoke. She has never used smokeless tobacco. She reports that she does not drink alcohol and does not use drugs.     /56   Pulse 87   Temp 98.5 °F (36.9 °C)   Resp 16   Ht 157.5 cm (62.01\")   SpO2 93%   BMI 35.47 kg/m²   Physical " Exam  HENT:      Mouth/Throat:      Mouth: Mucous membranes are moist. Oral lesions present.      Tongue: Lesions present.      Comments: Whitish plaques on the tongue with multiple small cuts & lacerations.                  Diagnoses and all orders for this visit:    1. Oral thrush (Primary)  -     nystatin (MYCOSTATIN) 100,000 unit/mL suspension; Swish and swallow 5 mL 4 (Four) Times a Day.  Dispense: 473 mL; Refill: 0             Return for Next scheduled follow up.

## 2024-07-17 ENCOUNTER — TELEPHONE (OUTPATIENT)
Dept: FAMILY MEDICINE CLINIC | Facility: CLINIC | Age: 74
End: 2024-07-17
Payer: MEDICARE

## 2024-07-17 NOTE — TELEPHONE ENCOUNTER
Rec'd call from [facility] about pt's INR which was 2.2 done on July 16th.     Pt's # [phone]

## 2024-07-31 DIAGNOSIS — I10 BENIGN ESSENTIAL HYPERTENSION: ICD-10-CM

## 2024-07-31 DIAGNOSIS — I51.89 DIASTOLIC DYSFUNCTION: ICD-10-CM

## 2024-07-31 DIAGNOSIS — F32.A DEPRESSION, UNSPECIFIED DEPRESSION TYPE: ICD-10-CM

## 2024-07-31 DIAGNOSIS — I87.2 CHRONIC VENOUS INSUFFICIENCY: Primary | ICD-10-CM

## 2024-07-31 RX ORDER — DULOXETIN HYDROCHLORIDE 60 MG/1
60 CAPSULE, DELAYED RELEASE ORAL DAILY
Qty: 90 CAPSULE | Refills: 1 | Status: SHIPPED | OUTPATIENT
Start: 2024-07-31

## 2024-07-31 RX ORDER — FUROSEMIDE 20 MG/1
20 TABLET ORAL DAILY
Qty: 90 TABLET | Refills: 1 | Status: SHIPPED | OUTPATIENT
Start: 2024-07-31

## 2024-08-07 ENCOUNTER — TELEPHONE (OUTPATIENT)
Dept: FAMILY MEDICINE CLINIC | Facility: CLINIC | Age: 74
End: 2024-08-07
Payer: MEDICARE

## 2024-08-12 DIAGNOSIS — I51.89 DIASTOLIC DYSFUNCTION: Primary | ICD-10-CM

## 2024-08-12 DIAGNOSIS — I10 BENIGN ESSENTIAL HYPERTENSION: ICD-10-CM

## 2024-08-12 RX ORDER — POTASSIUM CHLORIDE 20 MEQ/1
20 TABLET, EXTENDED RELEASE ORAL 2 TIMES DAILY
Qty: 180 TABLET | Refills: 0 | Status: SHIPPED | OUTPATIENT
Start: 2024-08-12

## 2024-08-15 ENCOUNTER — TELEPHONE (OUTPATIENT)
Dept: FAMILY MEDICINE CLINIC | Facility: CLINIC | Age: 74
End: 2024-08-15

## 2024-08-15 NOTE — TELEPHONE ENCOUNTER
Caller: CHRISTI    Relationship to patient: Other    Best call back number: [phone]    Patient is needing: THEY SENT AN ORDER ON 8/7/24 REQUESTING THAT THE PATIENTS INR BE MONITORED. PLEASE REACH OUT TO LET THEM KNOW IF THIS WAS RECEIVED.

## 2024-08-15 NOTE — TELEPHONE ENCOUNTER
Called customer service they need a new rx with you as the prescribing Dr for the Inr checks Dr Faustin was the last Md on the orders fax order to [phone]

## 2024-08-27 ENCOUNTER — TELEPHONE (OUTPATIENT)
Dept: FAMILY MEDICINE CLINIC | Facility: CLINIC | Age: 74
End: 2024-08-27
Payer: MEDICARE

## 2024-08-27 NOTE — TELEPHONE ENCOUNTER
[facility] CALLED WITH OUT OF RANGE INR FOR PATIENT. IT WAS TAKEN YESTERDAY AND IT WAS 3.1      PALMIRA

## 2024-09-01 DIAGNOSIS — J45.40 MODERATE PERSISTENT ASTHMA WITHOUT COMPLICATION: ICD-10-CM

## 2024-09-01 DIAGNOSIS — M79.7 FIBROMYALGIA: ICD-10-CM

## 2024-09-03 RX ORDER — BACLOFEN 10 MG/1
10 TABLET ORAL 2 TIMES DAILY
Qty: 180 TABLET | Refills: 1 | Status: SHIPPED | OUTPATIENT
Start: 2024-09-03

## 2024-09-03 RX ORDER — PREDNISONE 1 MG/1
2 TABLET ORAL DAILY
Qty: 180 TABLET | Refills: 0 | OUTPATIENT
Start: 2024-09-03

## 2024-09-03 NOTE — TELEPHONE ENCOUNTER
Rx Refill Note  Requested Prescriptions     Pending Prescriptions Disp Refills    predniSONE (DELTASONE) 1 MG tablet [Pharmacy Med Name: PREDNISONE  1MG  TAB] 180 tablet 0     Sig: TAKE 2 TABLETS BY MOUTH DAILY    baclofen (LIORESAL) 10 MG tablet [Pharmacy Med Name: Baclofen 10 MG Oral Tablet] 180 tablet 1     Sig: TAKE 1 TABLET BY MOUTH TWICE  DAILY      Last office visit with prescribing clinician: 7/11/2024   Last telemedicine visit with prescribing clinician: Visit date not found   Next office visit with prescribing clinician: 9/5/2024                         Would you like a call back once the refill request has been completed: [] Yes [] No    If the office needs to give you a call back, can they leave a voicemail: [] Yes [] No    Emely Parson MA  09/03/24, 08:13 EDT

## 2024-09-04 DIAGNOSIS — E03.9 ACQUIRED HYPOTHYROIDISM: Primary | ICD-10-CM

## 2024-09-04 DIAGNOSIS — I27.20 PULMONARY HYPERTENSION: ICD-10-CM

## 2024-09-04 DIAGNOSIS — E78.2 MIXED HYPERLIPIDEMIA: ICD-10-CM

## 2024-09-05 ENCOUNTER — OFFICE VISIT (OUTPATIENT)
Dept: FAMILY MEDICINE CLINIC | Facility: CLINIC | Age: 74
End: 2024-09-05
Payer: MEDICARE

## 2024-09-05 VITALS
RESPIRATION RATE: 14 BRPM | DIASTOLIC BLOOD PRESSURE: 62 MMHG | HEART RATE: 71 BPM | TEMPERATURE: 98.4 F | BODY MASS INDEX: 35.47 KG/M2 | HEIGHT: 62 IN | SYSTOLIC BLOOD PRESSURE: 98 MMHG | OXYGEN SATURATION: 93 %

## 2024-09-05 DIAGNOSIS — E78.2 MIXED HYPERLIPIDEMIA: Primary | ICD-10-CM

## 2024-09-05 DIAGNOSIS — Z79.01 ON WARFARIN THERAPY: ICD-10-CM

## 2024-09-05 DIAGNOSIS — N18.32 CHRONIC RENAL IMPAIRMENT, STAGE 3B: ICD-10-CM

## 2024-09-05 DIAGNOSIS — J44.9 CHRONIC OBSTRUCTIVE PULMONARY DISEASE, UNSPECIFIED COPD TYPE: ICD-10-CM

## 2024-09-05 DIAGNOSIS — I48.0 PAF (PAROXYSMAL ATRIAL FIBRILLATION): ICD-10-CM

## 2024-09-05 DIAGNOSIS — E03.9 ACQUIRED HYPOTHYROIDISM: ICD-10-CM

## 2024-09-05 LAB
ALBUMIN SERPL-MCNC: 4.1 G/DL (ref 3.5–5.2)
ALBUMIN/GLOB SERPL: 1.9 G/DL
ALP SERPL-CCNC: 64 U/L (ref 39–117)
ALT SERPL-CCNC: 15 U/L (ref 1–33)
AST SERPL-CCNC: 17 U/L (ref 1–32)
BILIRUB SERPL-MCNC: 0.3 MG/DL (ref 0–1.2)
BUN SERPL-MCNC: 18 MG/DL (ref 8–23)
BUN/CREAT SERPL: 12.9 (ref 7–25)
CALCIUM SERPL-MCNC: 9.5 MG/DL (ref 8.6–10.5)
CHLORIDE SERPL-SCNC: 101 MMOL/L (ref 98–107)
CHOLEST SERPL-MCNC: 129 MG/DL (ref 0–200)
CO2 SERPL-SCNC: 25.9 MMOL/L (ref 22–29)
CREAT SERPL-MCNC: 1.4 MG/DL (ref 0.57–1)
EGFRCR SERPLBLD CKD-EPI 2021: 39.3 ML/MIN/1.73
GLOBULIN SER CALC-MCNC: 2.2 GM/DL
GLUCOSE SERPL-MCNC: 90 MG/DL (ref 65–99)
HDLC SERPL-MCNC: 51 MG/DL (ref 40–60)
LDLC SERPL CALC-MCNC: 50 MG/DL (ref 0–100)
LDLC/HDLC SERPL: 0.87 {RATIO}
POTASSIUM SERPL-SCNC: 4.1 MMOL/L (ref 3.5–5.2)
PROT SERPL-MCNC: 6.3 G/DL (ref 6–8.5)
SODIUM SERPL-SCNC: 138 MMOL/L (ref 136–145)
TRIGL SERPL-MCNC: 169 MG/DL (ref 0–150)
TSH SERPL DL<=0.005 MIU/L-ACNC: 2.43 UIU/ML (ref 0.27–4.2)
VLDLC SERPL CALC-MCNC: 28 MG/DL (ref 5–40)

## 2024-09-05 PROCEDURE — 3078F DIAST BP <80 MM HG: CPT | Performed by: INTERNAL MEDICINE

## 2024-09-05 PROCEDURE — 1125F AMNT PAIN NOTED PAIN PRSNT: CPT | Performed by: INTERNAL MEDICINE

## 2024-09-05 PROCEDURE — 99214 OFFICE O/P EST MOD 30 MIN: CPT | Performed by: INTERNAL MEDICINE

## 2024-09-05 PROCEDURE — 3074F SYST BP LT 130 MM HG: CPT | Performed by: INTERNAL MEDICINE

## 2024-09-05 NOTE — ASSESSMENT & PLAN NOTE
COPD is stable.    Plan:  Continue same medication/s without change.    Warning signs of respiratory distress were reviewed with the patient.   Discussed distinction between quick-relief and maintenance control medications.  Discussed monitoring symptoms and use of quick-relief medications and contacting provider early in the course of exacerbations..    Patient Treatment Goals:   symptom prevention, minimizing limitation in activity, prevention of exacerbations and use of ER/inpatient care, maintenance of optimal pulmonary function, and minimization of adverse effects of treatment    Followup at the next regular appointment.

## 2024-09-05 NOTE — ASSESSMENT & PLAN NOTE
PT/INR 3.1, slightly above therapeutic range 2.0-3.0  Continue on current treatment regimen  Will continue to monitor PT/INR biweekly.

## 2024-09-05 NOTE — ASSESSMENT & PLAN NOTE
Lipid abnormalities are improving with treatment    Plan:  Continue same medication/s without change.      Counseled patient on lifestyle modifications to help control hyperlipidemia.   Cholesterol lowering dietary information shared with patient.  Advised patient to exercise for 150 minutes weekly. (30 minute brisk walk, 5 days a week for example)  Weight Loss encouraged    Patient Treatment Goals:   LDL goal is less than 70    Followup in 6 months.

## 2024-09-05 NOTE — PROGRESS NOTES
Chief Complaint   Patient presents with    Follow-up     Pt here to f/u on labs, complaining of heel pain     Asthma    Anemia    Hypertension    Hypothyroidism   History of Present Illness:  Patient is here for follow-up of hyperlipidemia, hypothyroidism, COPD, CKD stage III and GERD.  She reports intermittent bilateral heel pain mostly at night when lying on bed.  Still have nocturnal leg cramps but is improving on medication.  Lab review indicate stable lipid abnormalities, TSH improved to normal since increasing levothyroxine to 75 mcg, stable CKD, no worsening kidney function.  PT/INR 3.1 [slightly above therapeutic range]    PMH:   Outpatient Medications Prior to Visit   Medication Sig Dispense Refill    allopurinol (ZYLOPRIM) 100 MG tablet Take 1 tablet by mouth Daily. 90 tablet 3    atorvastatin (LIPITOR) 40 MG tablet Take 1 tablet by mouth Daily. 90 tablet 3    baclofen (LIORESAL) 10 MG tablet TAKE 1 TABLET BY MOUTH TWICE  DAILY 180 tablet 1    BROVANA 15 MCG/2ML nebulizer solution USE 1 VIAL PER NEBULIZER BID  5    budesonide (PULMICORT) 1 MG/2ML nebulizer solution Pulmicort SUSP; Patient Sig: Pulmicort SUSP USE 1 UNIT DOSE VIA NEBULIZER TWICE DAILY; 0; 25-Mar-2013; Active      calcium carbonate (OS-SEBASTIÁN) 600 MG tablet Take 1 tablet by mouth 2 (Two) Times a Day With Meals.      DULoxetine (CYMBALTA) 60 MG capsule Take 1 capsule by mouth Daily. 90 capsule 1    furosemide (LASIX) 20 MG tablet Take 1 tablet by mouth Daily. 90 tablet 1    HYDROcodone-acetaminophen (NORCO)  MG per tablet Take 1 tablet by mouth 3 times a day.      ipratropium-albuterol (DUO-NEB) 0.5-2.5 mg/mL nebulizer USE 1 VIAL PER NEBULIZER QID  5    levothyroxine (SYNTHROID, LEVOTHROID) 75 MCG tablet Take 1 tablet by mouth Daily. 90 tablet 2    metoclopramide (REGLAN) 10 MG tablet Take 1 tablet by mouth 4 (Four) Times a Day. 360 tablet 3    Multiple Vitamins-Minerals (WOMENS 50+ MULTI VITAMIN/MIN PO) Take 1 tablet by mouth Daily.       nystatin (MYCOSTATIN) 100,000 unit/mL suspension Swish and swallow 5 mL 4 (Four) Times a Day. 473 mL 0    nystatin (MYCOSTATIN) 605309 UNIT/GM powder Apply  topically to the appropriate area as directed Every 12 (Twelve) Hours. 15 g 0    OXYGEN-HELIUM IN Inhale.      pantoprazole (PROTONIX) 40 MG EC tablet Take 1 tablet by mouth Daily. 90 tablet 2    potassium chloride (KLOR-CON M20) 20 MEQ CR tablet Take 1 tablet by mouth 2 (Two) Times a Day. 180 tablet 0    rOPINIRole (REQUIP) 1 MG tablet Take 2 tablets by mouth Every Night. Take 1 hour before bedtime. 180 tablet 3    topiramate (TOPAMAX) 25 MG tablet Take 1 tablet by mouth 2 (Two) Times a Day. 180 tablet 3    Umeclidinium-Vilanterol (Anoro Ellipta) 62.5-25 MCG/ACT aerosol powder  inhaler Inhale 1 puff Daily. 60 each 11    warfarin (Coumadin) 1 MG tablet Take 1 tablet twice a week in addition to her 4 mg daily 30 tablet 5    warfarin (COUMADIN) 4 MG tablet TAKE 1 TABLET BY MOUTH AT NIGHT  AS DIRECTED 90 tablet 3    predniSONE (DELTASONE) 1 MG tablet Take 2 tablets by mouth Daily. 180 tablet 0     No facility-administered medications prior to visit.      Allergies   Allergen Reactions    Pneumococcal Vaccines Delirium     Fever, chills    Asa [Aspirin]     Levofloxacin     Meperidine Other (See Comments)    Naproxen     Pregabalin Other (See Comments)    Propoxyphene     Sulfa Antibiotics Other (See Comments)     Past Surgical History:   Procedure Laterality Date    CATARACT EXTRACTION      COLON SURGERY      COLONOSCOPY      ENDOSCOPY N/A 6/17/2016    Procedure: ESOPHAGOGASTRODUODENOSCOPY with biopsy and viral culture;  Surgeon: Presley Cornelius MD;  Location: [facility] ENDOSCOPY;  Service:     HIP SURGERY      HYSTERECTOMY       family history includes Cancer in her brother and mother; Other in her father.   reports that she has never smoked. She has never been exposed to tobacco smoke. She has never used smokeless tobacco. She reports that she does not drink  "alcohol and does not use drugs.     BP 98/62 (BP Location: Right arm, Patient Position: Sitting, Cuff Size: Adult)   Pulse 71   Temp 98.4 °F (36.9 °C) (Oral)   Resp 14   Ht 157.5 cm (62.01\")   SpO2 93%   BMI 35.47 kg/m²   Physical Exam  Constitutional:       Appearance: Normal appearance.   HENT:      Head: Normocephalic and atraumatic.   Cardiovascular:      Heart sounds: Normal heart sounds.   Pulmonary:      Breath sounds: Normal breath sounds.   Neurological:      Mental Status: She is alert and oriented to person, place, and time.          The following data was reviewed by: Jackelyn Tidwell MD on 09/05/2024:  Common labs          2/26/2024    08:58 9/4/2024    10:21   Common Labs   Glucose 99  90    BUN 20  18    Creatinine 1.61  1.40    Sodium 141  138    Potassium 4.7  4.1    Chloride 106  101    Calcium 9.4  9.5    Total Protein 5.9  6.3    Albumin 4.0  4.1    Total Bilirubin 0.2  0.3    Alkaline Phosphatase 61  64    AST (SGOT) 14  17    ALT (SGPT) 10  15    Total Cholesterol 136  129    Triglycerides 184  169    HDL Cholesterol 58  51    LDL Cholesterol  48  50    Microalbumin, Urine 11.5       TSH          2/26/2024    08:58 9/4/2024    10:21   TSH   TSH 5.180  2.430           Diagnoses and all orders for this visit:    1. Mixed hyperlipidemia (Primary)  Assessment & Plan:   Lipid abnormalities are improving with treatment    Plan:  Continue same medication/s without change.      Counseled patient on lifestyle modifications to help control hyperlipidemia.   Cholesterol lowering dietary information shared with patient.  Advised patient to exercise for 150 minutes weekly. (30 minute brisk walk, 5 days a week for example)  Weight Loss encouraged    Patient Treatment Goals:   LDL goal is less than 70    Followup in 6 months.      2. Chronic renal impairment, stage 3b  Assessment & Plan:  Renal condition is stable.  Continue current treatment regimen.  Weight loss.  Regular aerobic exercise.  Renal " condition will be reassessed in 6 months.      3. Chronic obstructive pulmonary disease, unspecified COPD type  Assessment & Plan:  COPD is stable.    Plan:  Continue same medication/s without change.    Warning signs of respiratory distress were reviewed with the patient.   Discussed distinction between quick-relief and maintenance control medications.  Discussed monitoring symptoms and use of quick-relief medications and contacting provider early in the course of exacerbations..    Patient Treatment Goals:   symptom prevention, minimizing limitation in activity, prevention of exacerbations and use of ER/inpatient care, maintenance of optimal pulmonary function, and minimization of adverse effects of treatment    Followup at the next regular appointment.        4. Acquired hypothyroidism  Assessment & Plan:  TSH improved back to normal  Will continue on levothyroxine 75 mcg daily  To repeat TSH in 1 year      5. PAF (paroxysmal atrial fibrillation)  Assessment & Plan:  Asymptomatic and vitally stable  Continue on warfarin therapy      6. On warfarin therapy  Assessment & Plan:  PT/INR 3.1, slightly above therapeutic range 2.0-3.0  Continue on current treatment regimen  Will continue to monitor PT/INR biweekly.               Return in about 6 months (around 3/5/2025) for Follow up with fasting labs.

## 2024-09-05 NOTE — ASSESSMENT & PLAN NOTE
Renal condition is stable.  Continue current treatment regimen.  Weight loss.  Regular aerobic exercise.  Renal condition will be reassessed in 6 months.

## 2024-09-06 ENCOUNTER — TELEPHONE (OUTPATIENT)
Dept: FAMILY MEDICINE CLINIC | Facility: CLINIC | Age: 74
End: 2024-09-06

## 2024-09-06 NOTE — TELEPHONE ENCOUNTER
Caller: SRIRAM NORMAN    Best call back number: [phone]*    What was the call regarding: INR READING FOR 9/5/24, IS 2.0, AT 10:10PM

## 2024-09-24 ENCOUNTER — TELEPHONE (OUTPATIENT)
Dept: FAMILY MEDICINE CLINIC | Facility: CLINIC | Age: 74
End: 2024-09-24

## 2024-09-24 NOTE — TELEPHONE ENCOUNTER
Hub staff attempted to follow warm transfer process and was unsuccessful     Caller: CHRISTI    Relationship to patient:     Best call back number: [phone]     Patient is needing: PATIENT HAS AN OUT OF RANGE INR OF 2.7.    PLEASE CONTACT [facility] TO ADVISE.         THANKS

## 2024-10-02 ENCOUNTER — TELEPHONE (OUTPATIENT)
Dept: FAMILY MEDICINE CLINIC | Facility: CLINIC | Age: 74
End: 2024-10-02
Payer: MEDICARE

## 2024-10-09 ENCOUNTER — NURSE TRIAGE (OUTPATIENT)
Dept: CALL CENTER | Facility: HOSPITAL | Age: 74
End: 2024-10-09
Payer: MEDICARE

## 2024-10-09 NOTE — TELEPHONE ENCOUNTER
"[facility]- MD/INR called Marie - the patient self test at home - INR 2.8 for 10/08/24-  Will call results to the office.   The office back line was called.     The office was notified of the lab results.   Reason for Disposition   Lab or radiology calling with test results   Lab or radiology calling with CRITICAL test results    Additional Information   Negative: Lab calling with strep throat test results and triager can call in prescription   Negative: Lab calling with urinalysis test results and triager can call in prescription   Negative: Medication questions   Negative: Medication renewal and refill questions   Negative: Pre-operative or pre-procedural questions   Negative: ED call to PCP (i.e., primary care provider; doctor, NP, or PA)   Negative: Doctor (or NP/PA) call to PCP   Negative: Call about patient who is currently hospitalized   Negative: [1] Follow-up call from patient regarding patient's clinical status AND [2] information urgent   Negative: [1] Caller requests to speak ONLY to PCP AND [2] URGENT question   Negative: [1] Caller requests to speak to PCP now AND [2] won't tell us reason for call  (Exception: If 10 pm to 6 am, caller must first discuss reason for the call.)   Negative: Notification of hospital admission   Negative: Notification of death   Negative: Caller requesting lab results  (Exception: Routine or non-urgent lab result.)    Answer Assessment - Initial Assessment Questions  1. REASON FOR CALL or QUESTION: \"What is your reason for calling today?\" or \"How can I best  help you?\" or \"What question do you have that I can help answer?\"      Lab results from MR/INR of 2.8 for INR   2. CALLER: Document the source of call. (e.g., laboratory, patient).      Marie with the MD/AARON    Protocols used: PCP Call - No Triage-ADULT-AH    "

## 2024-10-15 DIAGNOSIS — I48.0 PAF (PAROXYSMAL ATRIAL FIBRILLATION): ICD-10-CM

## 2024-10-15 DIAGNOSIS — I10 BENIGN ESSENTIAL HYPERTENSION: ICD-10-CM

## 2024-10-15 DIAGNOSIS — I51.89 DIASTOLIC DYSFUNCTION: ICD-10-CM

## 2024-10-15 DIAGNOSIS — E78.2 MIXED HYPERLIPIDEMIA: ICD-10-CM

## 2024-10-15 RX ORDER — POTASSIUM CHLORIDE 1500 MG/1
20 TABLET, EXTENDED RELEASE ORAL 2 TIMES DAILY
Qty: 180 TABLET | Refills: 3 | Status: SHIPPED | OUTPATIENT
Start: 2024-10-15

## 2024-10-15 RX ORDER — WARFARIN SODIUM 4 MG/1
4 TABLET ORAL NIGHTLY
Qty: 90 TABLET | Refills: 3 | Status: SHIPPED | OUTPATIENT
Start: 2024-10-15

## 2024-10-15 RX ORDER — ATORVASTATIN CALCIUM 40 MG/1
40 TABLET, FILM COATED ORAL DAILY
Qty: 90 TABLET | Refills: 3 | Status: SHIPPED | OUTPATIENT
Start: 2024-10-15

## 2024-10-17 ENCOUNTER — TELEPHONE (OUTPATIENT)
Dept: FAMILY MEDICINE CLINIC | Facility: CLINIC | Age: 74
End: 2024-10-17

## 2024-10-17 NOTE — TELEPHONE ENCOUNTER
Caller: [facility]    Best call back number: [phone]    What test was performed: INR    When was the test performed: 10/16/24    Additional notes: PT'S INR WAS 3.3.

## 2024-10-30 ENCOUNTER — TELEPHONE (OUTPATIENT)
Dept: FAMILY MEDICINE CLINIC | Facility: CLINIC | Age: 74
End: 2024-10-30
Payer: MEDICARE

## 2024-11-06 ENCOUNTER — TELEPHONE (OUTPATIENT)
Dept: FAMILY MEDICINE CLINIC | Facility: CLINIC | Age: 74
End: 2024-11-06
Payer: MEDICARE

## 2024-11-13 ENCOUNTER — TELEPHONE (OUTPATIENT)
Dept: FAMILY MEDICINE CLINIC | Facility: CLINIC | Age: 74
End: 2024-11-13
Payer: MEDICARE

## 2024-11-13 NOTE — TELEPHONE ENCOUNTER
Received a call from Mishel with [facility] [phone] stated that pt does self test and she tested today at a 2.2 INR

## 2024-11-20 ENCOUNTER — TELEPHONE (OUTPATIENT)
Dept: FAMILY MEDICINE CLINIC | Facility: CLINIC | Age: 74
End: 2024-11-20
Payer: MEDICARE

## 2024-11-20 NOTE — TELEPHONE ENCOUNTER
2.5 out of range INR reported from [facility].  [phone] if you have any questions.        Please contact pt with plan, thanks

## 2024-11-25 NOTE — TELEPHONE ENCOUNTER
Called the number listed 3x and  every time it picks up it hangs up, spoke with the patient, patient is aware.

## 2024-12-04 ENCOUNTER — TELEPHONE (OUTPATIENT)
Dept: FAMILY MEDICINE CLINIC | Facility: CLINIC | Age: 74
End: 2024-12-04

## 2024-12-04 NOTE — TELEPHONE ENCOUNTER
Caller:CHRISTI BHATIA     Phone:[phone]     INR Number Being Reported:2.0      Day of the Week  Monday Tuesday Wednesday Thursday Friday Saturday Sunday     Dose Taken

## 2024-12-15 DIAGNOSIS — I51.89 DIASTOLIC DYSFUNCTION: ICD-10-CM

## 2024-12-15 DIAGNOSIS — F32.A DEPRESSION, UNSPECIFIED DEPRESSION TYPE: ICD-10-CM

## 2024-12-16 RX ORDER — FUROSEMIDE 20 MG/1
20 TABLET ORAL DAILY
Qty: 90 TABLET | Refills: 3 | Status: SHIPPED | OUTPATIENT
Start: 2024-12-16

## 2024-12-16 RX ORDER — DULOXETIN HYDROCHLORIDE 60 MG/1
60 CAPSULE, DELAYED RELEASE ORAL DAILY
Qty: 90 CAPSULE | Refills: 3 | Status: SHIPPED | OUTPATIENT
Start: 2024-12-16

## 2024-12-18 ENCOUNTER — TELEPHONE (OUTPATIENT)
Dept: FAMILY MEDICINE CLINIC | Facility: CLINIC | Age: 74
End: 2024-12-18

## 2024-12-26 ENCOUNTER — TELEPHONE (OUTPATIENT)
Dept: FAMILY MEDICINE CLINIC | Facility: CLINIC | Age: 74
End: 2024-12-26
Payer: MEDICARE

## 2025-01-02 ENCOUNTER — TELEPHONE (OUTPATIENT)
Dept: FAMILY MEDICINE CLINIC | Facility: CLINIC | Age: 75
End: 2025-01-02
Payer: MEDICARE

## 2025-01-03 DIAGNOSIS — K21.9 GASTROESOPHAGEAL REFLUX DISEASE, UNSPECIFIED WHETHER ESOPHAGITIS PRESENT: ICD-10-CM

## 2025-01-03 DIAGNOSIS — G43.009 MIGRAINE WITHOUT AURA AND WITHOUT STATUS MIGRAINOSUS, NOT INTRACTABLE: ICD-10-CM

## 2025-01-03 DIAGNOSIS — E03.9 ACQUIRED HYPOTHYROIDISM: ICD-10-CM

## 2025-01-08 ENCOUNTER — TELEPHONE (OUTPATIENT)
Dept: FAMILY MEDICINE CLINIC | Facility: CLINIC | Age: 75
End: 2025-01-08

## 2025-01-08 RX ORDER — TOPIRAMATE 25 MG/1
25 TABLET, FILM COATED ORAL 2 TIMES DAILY
Qty: 180 TABLET | Refills: 3 | Status: SHIPPED | OUTPATIENT
Start: 2025-01-08

## 2025-01-08 RX ORDER — PANTOPRAZOLE SODIUM 40 MG/1
40 TABLET, DELAYED RELEASE ORAL DAILY
Qty: 90 TABLET | Refills: 3 | Status: SHIPPED | OUTPATIENT
Start: 2025-01-08

## 2025-01-08 RX ORDER — LEVOTHYROXINE SODIUM 75 UG/1
75 TABLET ORAL DAILY
Qty: 90 TABLET | Refills: 3 | Status: SHIPPED | OUTPATIENT
Start: 2025-01-08

## 2025-01-08 NOTE — TELEPHONE ENCOUNTER
Caller: MIHAELA    Relationship: Other [facility]    Best call back number: [phone]     What was the call regarding: REPORTING AN OUT OF RANGE (NOT CRITICAL) INR OF 2.0. PLEASE CONTACT PATIENT WITH DOSAGE INSTRUCTIONS

## 2025-01-10 ENCOUNTER — DOCUMENTATION (OUTPATIENT)
Dept: FAMILY MEDICINE CLINIC | Facility: CLINIC | Age: 75
End: 2025-01-10
Payer: MEDICARE

## 2025-02-05 ENCOUNTER — TELEPHONE (OUTPATIENT)
Dept: FAMILY MEDICINE CLINIC | Facility: CLINIC | Age: 75
End: 2025-02-05

## 2025-02-05 NOTE — TELEPHONE ENCOUNTER
Caller: SAULD    Relationship to patient: Provider    Best call back number: [phone]     INR RESULTS ARE 1.8 FOR 02/05/25

## 2025-02-10 DIAGNOSIS — I48.0 PAF (PAROXYSMAL ATRIAL FIBRILLATION): ICD-10-CM

## 2025-02-10 DIAGNOSIS — R79.1 SUBTHERAPEUTIC INTERNATIONAL NORMALIZED RATIO (INR): Primary | ICD-10-CM

## 2025-02-10 RX ORDER — WARFARIN SODIUM 1 MG/1
TABLET ORAL
Qty: 30 TABLET | Refills: 3 | Status: SHIPPED | OUTPATIENT
Start: 2025-02-10

## 2025-02-12 ENCOUNTER — TELEPHONE (OUTPATIENT)
Dept: FAMILY MEDICINE CLINIC | Facility: CLINIC | Age: 75
End: 2025-02-12
Payer: MEDICARE

## 2025-02-19 ENCOUNTER — TELEPHONE (OUTPATIENT)
Dept: FAMILY MEDICINE CLINIC | Facility: CLINIC | Age: 75
End: 2025-02-19

## 2025-02-19 NOTE — TELEPHONE ENCOUNTER
Caller: JULIANA BARRAZA    Phone: [phone]     INR Number Being Reported: 2.0 AS OF 02/18/2025     JULIANA STATES THIS IS AN OUT OF RANGE INR.

## 2025-02-26 ENCOUNTER — OFFICE VISIT (OUTPATIENT)
Dept: FAMILY MEDICINE CLINIC | Facility: CLINIC | Age: 75
End: 2025-02-26
Payer: MEDICARE

## 2025-02-26 VITALS
OXYGEN SATURATION: 93 % | DIASTOLIC BLOOD PRESSURE: 76 MMHG | WEIGHT: 173 LBS | HEIGHT: 62 IN | SYSTOLIC BLOOD PRESSURE: 120 MMHG | HEART RATE: 69 BPM | TEMPERATURE: 97.4 F | BODY MASS INDEX: 31.83 KG/M2 | RESPIRATION RATE: 18 BRPM

## 2025-02-26 DIAGNOSIS — R52 GENERALIZED BODY ACHES: ICD-10-CM

## 2025-02-26 DIAGNOSIS — F41.9 ANXIETY-LIKE SYMPTOMS: Primary | ICD-10-CM

## 2025-02-26 LAB
EXPIRATION DATE: NORMAL
FLUAV AG UPPER RESP QL IA.RAPID: NOT DETECTED
FLUBV AG UPPER RESP QL IA.RAPID: NOT DETECTED
INTERNAL CONTROL: NORMAL
Lab: NORMAL
SARS-COV-2 AG UPPER RESP QL IA.RAPID: NOT DETECTED

## 2025-02-26 PROCEDURE — 87428 SARSCOV & INF VIR A&B AG IA: CPT | Performed by: INTERNAL MEDICINE

## 2025-02-26 PROCEDURE — 3074F SYST BP LT 130 MM HG: CPT | Performed by: INTERNAL MEDICINE

## 2025-02-26 PROCEDURE — 3078F DIAST BP <80 MM HG: CPT | Performed by: INTERNAL MEDICINE

## 2025-02-26 PROCEDURE — 1125F AMNT PAIN NOTED PAIN PRSNT: CPT | Performed by: INTERNAL MEDICINE

## 2025-02-26 PROCEDURE — 99213 OFFICE O/P EST LOW 20 MIN: CPT | Performed by: INTERNAL MEDICINE

## 2025-02-26 RX ORDER — ALPRAZOLAM 0.5 MG
0.5 TABLET ORAL NIGHTLY PRN
Qty: 30 TABLET | Refills: 0 | Status: SHIPPED | OUTPATIENT
Start: 2025-02-26

## 2025-02-26 NOTE — TELEPHONE ENCOUNTER
Caller: CHRISTI    Relationship to patient: Other    Best call back number: [phone]       MIHAELA IS REPORTING OUT OF RANGE INR     READING ON 02/26/25 IS 3.0

## 2025-02-26 NOTE — PROGRESS NOTES
"Chief Complaint  Generalized Body Aches and Headache (Pt here got body aches states for the past month she's been having headaches and pressure in her body, been taking ibuprofen and doesn't help at all also complains of muscle spasms and baclofen not working for those, complains of sinus issues all winter )    Subjective        Alison Colvin presents to [facility]  History of Present Illness  Patient is a 74-year-old female who presented to the office today with complaints of generalized body aches and headache.  She reports pressure-like discomfort in her chest, back, abdomen and head for the past 1 month or so.  Reports pressure-like headache, does not feel like her usual migraine, no associated nausea/vomiting, visual disturbance, photophobia or phonophobia.  Symptoms usually worse at night disturbing her sleep but can occur sometimes during the day.  Took some ibuprofen occasionally without any significant relief.  Denies any chest pain presently, cough, difficulty breathing, palpitations, blurry vision, muscle weakness/numbness/tingling in the extremities, fever/chills, nausea/vomiting, lightheadedness/syncope.  Reports having been exposed to sick contact [her  who recently had viral URI]      Objective   Vital Signs:  /76 (BP Location: Right arm, Patient Position: Sitting, Cuff Size: Adult)   Pulse 69   Temp 97.4 °F (36.3 °C) (Temporal)   Resp 18   Ht 157.5 cm (62.01\")   Wt 78.5 kg (173 lb)   SpO2 93%   BMI 31.63 kg/m²   Estimated body mass index is 31.63 kg/m² as calculated from the following:    Height as of this encounter: 157.5 cm (62.01\").    Weight as of this encounter: 78.5 kg (173 lb).          Physical Exam  Constitutional:       Appearance: Normal appearance.   HENT:      Head: Normocephalic and atraumatic.      Mouth/Throat:      Mouth: Mucous membranes are moist.   Eyes:      Conjunctiva/sclera: Conjunctivae normal.   Cardiovascular:      Rate " and Rhythm: Normal rate and regular rhythm.      Pulses: Normal pulses.      Heart sounds: Normal heart sounds.   Pulmonary:      Effort: Pulmonary effort is normal.      Breath sounds: Normal breath sounds.   Abdominal:      General: Bowel sounds are normal. There is no distension.      Palpations: Abdomen is soft.      Tenderness: There is no abdominal tenderness.   Neurological:      Mental Status: She is alert and oriented to person, place, and time.   Psychiatric:         Mood and Affect: Mood normal.        Result Review :  The following data was reviewed by: Jackelyn Tidwell MD on 02/26/2025:                Assessment and Plan   Diagnoses and all orders for this visit:    1. Anxiety-like symptoms (Primary)  -     ALPRAZolam (Xanax) 0.5 MG tablet; Take 1 tablet by mouth At Night As Needed for Anxiety or Sleep.  Dispense: 30 tablet; Refill: 0    2. Generalized body aches  -     POCT SARS-CoV-2 Antigen EMMA + Flu             Follow Up   No follow-ups on file.  Patient was given instructions and counseling regarding her condition or for health maintenance advice. Please see specific information pulled into the AVS if appropriate.

## 2025-03-05 DIAGNOSIS — I10 BENIGN ESSENTIAL HYPERTENSION: ICD-10-CM

## 2025-03-05 DIAGNOSIS — E03.9 HYPOTHYROIDISM, UNSPECIFIED TYPE: ICD-10-CM

## 2025-03-05 DIAGNOSIS — E78.2 MIXED HYPERLIPIDEMIA: ICD-10-CM

## 2025-03-05 DIAGNOSIS — I27.20 PULMONARY HYPERTENSION: Primary | ICD-10-CM

## 2025-03-06 ENCOUNTER — TELEPHONE (OUTPATIENT)
Dept: FAMILY MEDICINE CLINIC | Facility: CLINIC | Age: 75
End: 2025-03-06

## 2025-03-06 LAB
ALBUMIN SERPL-MCNC: 3.9 G/DL (ref 3.5–5.2)
ALBUMIN/GLOB SERPL: 2 G/DL
ALP SERPL-CCNC: 51 U/L (ref 39–117)
ALT SERPL-CCNC: 23 U/L (ref 1–33)
AST SERPL-CCNC: 19 U/L (ref 1–32)
BASOPHILS # BLD AUTO: ABNORMAL 10*3/UL
BASOPHILS # BLD MANUAL: 0 10*3/MM3 (ref 0–0.2)
BASOPHILS NFR BLD MANUAL: 0 % (ref 0–1.5)
BILIRUB SERPL-MCNC: 0.3 MG/DL (ref 0–1.2)
BUN SERPL-MCNC: 23 MG/DL (ref 8–23)
BUN/CREAT SERPL: 16.9 (ref 7–25)
CALCIUM SERPL-MCNC: 9.2 MG/DL (ref 8.6–10.5)
CHLORIDE SERPL-SCNC: 103 MMOL/L (ref 98–107)
CHOLEST SERPL-MCNC: 150 MG/DL (ref 0–200)
CO2 SERPL-SCNC: 25 MMOL/L (ref 22–29)
CREAT SERPL-MCNC: 1.36 MG/DL (ref 0.57–1)
DIFFERENTIAL COMMENT: ABNORMAL
EGFRCR SERPLBLD CKD-EPI 2021: 40.5 ML/MIN/1.73
EOSINOPHIL # BLD AUTO: ABNORMAL 10*3/UL
EOSINOPHIL # BLD MANUAL: 0.6 10*3/MM3 (ref 0–0.4)
EOSINOPHIL NFR BLD AUTO: ABNORMAL %
EOSINOPHIL NFR BLD MANUAL: 7 % (ref 0.3–6.2)
ERYTHROCYTE [DISTWIDTH] IN BLOOD BY AUTOMATED COUNT: 14 % (ref 12.3–15.4)
GLOBULIN SER CALC-MCNC: 2 GM/DL
GLUCOSE SERPL-MCNC: 93 MG/DL (ref 65–99)
HCT VFR BLD AUTO: 37.2 % (ref 34–46.6)
HDLC SERPL-MCNC: 71 MG/DL (ref 40–60)
HGB BLD-MCNC: 11.8 G/DL (ref 12–15.9)
LDLC SERPL CALC-MCNC: 52 MG/DL (ref 0–100)
LDLC/HDLC SERPL: 0.65 {RATIO}
LYMPHOCYTES # BLD AUTO: ABNORMAL 10*3/UL
LYMPHOCYTES # BLD MANUAL: 1.64 10*3/MM3 (ref 0.7–3.1)
LYMPHOCYTES NFR BLD AUTO: ABNORMAL %
LYMPHOCYTES NFR BLD MANUAL: 19 % (ref 19.6–45.3)
MCH RBC QN AUTO: 29.7 PG (ref 26.6–33)
MCHC RBC AUTO-ENTMCNC: 31.7 G/DL (ref 31.5–35.7)
MCV RBC AUTO: 93.7 FL (ref 79–97)
MONOCYTES # BLD MANUAL: 1.04 10*3/MM3 (ref 0.1–0.9)
MONOCYTES NFR BLD AUTO: ABNORMAL %
MONOCYTES NFR BLD MANUAL: 12 % (ref 5–12)
NEUTROPHILS # BLD MANUAL: 5.36 10*3/MM3 (ref 1.7–7)
NEUTROPHILS NFR BLD AUTO: ABNORMAL %
NEUTROPHILS NFR BLD MANUAL: 62 % (ref 42.7–76)
PLATELET # BLD AUTO: 196 10*3/MM3 (ref 140–450)
PLATELET BLD QL SMEAR: ABNORMAL
POTASSIUM SERPL-SCNC: 4.6 MMOL/L (ref 3.5–5.2)
PROT SERPL-MCNC: 5.9 G/DL (ref 6–8.5)
RBC # BLD AUTO: 3.97 10*6/MM3 (ref 3.77–5.28)
RBC MORPH BLD: ABNORMAL
SODIUM SERPL-SCNC: 139 MMOL/L (ref 136–145)
T4 FREE SERPL-MCNC: 1.01 NG/DL (ref 0.92–1.68)
TRIGL SERPL-MCNC: 164 MG/DL (ref 0–150)
TSH SERPL DL<=0.005 MIU/L-ACNC: 2.32 UIU/ML (ref 0.27–4.2)
VLDLC SERPL CALC-MCNC: 27 MG/DL (ref 5–40)
WBC # BLD AUTO: 8.64 10*3/MM3 (ref 3.4–10.8)

## 2025-03-12 ENCOUNTER — OFFICE VISIT (OUTPATIENT)
Dept: FAMILY MEDICINE CLINIC | Facility: CLINIC | Age: 75
End: 2025-03-12
Payer: MEDICARE

## 2025-03-12 VITALS
OXYGEN SATURATION: 98 % | RESPIRATION RATE: 16 BRPM | WEIGHT: 173 LBS | DIASTOLIC BLOOD PRESSURE: 64 MMHG | SYSTOLIC BLOOD PRESSURE: 118 MMHG | TEMPERATURE: 97.5 F | BODY MASS INDEX: 31.83 KG/M2 | HEART RATE: 65 BPM | HEIGHT: 62 IN

## 2025-03-12 DIAGNOSIS — B07.9 VERRUCA VULGARIS: ICD-10-CM

## 2025-03-12 DIAGNOSIS — N18.32 CHRONIC RENAL IMPAIRMENT, STAGE 3B: ICD-10-CM

## 2025-03-12 DIAGNOSIS — Z23 IMMUNIZATION DUE: ICD-10-CM

## 2025-03-12 DIAGNOSIS — H91.93 DECREASED HEARING OF BOTH EARS: ICD-10-CM

## 2025-03-12 DIAGNOSIS — E03.9 ACQUIRED HYPOTHYROIDISM: ICD-10-CM

## 2025-03-12 DIAGNOSIS — J44.9 CHRONIC OBSTRUCTIVE PULMONARY DISEASE, UNSPECIFIED COPD TYPE: ICD-10-CM

## 2025-03-12 DIAGNOSIS — I48.0 PAF (PAROXYSMAL ATRIAL FIBRILLATION): ICD-10-CM

## 2025-03-12 DIAGNOSIS — Z00.00 MEDICARE ANNUAL WELLNESS VISIT, SUBSEQUENT: Primary | ICD-10-CM

## 2025-03-12 DIAGNOSIS — Z91.81 AT MODERATE RISK FOR FALL: ICD-10-CM

## 2025-03-12 PROBLEM — N18.30 CHRONIC RENAL IMPAIRMENT, STAGE 3 (MODERATE): Status: RESOLVED | Noted: 2017-07-11 | Resolved: 2025-03-12

## 2025-03-12 RX ORDER — WARFARIN SODIUM 4 MG/1
4 TABLET ORAL NIGHTLY
Qty: 90 TABLET | Refills: 3 | Status: SHIPPED | OUTPATIENT
Start: 2025-03-12 | End: 2025-03-12

## 2025-03-12 RX ORDER — WARFARIN SODIUM 4 MG/1
4 TABLET ORAL NIGHTLY
Qty: 90 TABLET | Refills: 3 | Status: SHIPPED | OUTPATIENT
Start: 2025-03-12

## 2025-03-12 NOTE — PATIENT INSTRUCTIONS
Advance Care Planning and Advance Directives     You make decisions on a daily basis - decisions about where you want to live, your career, your home, your life. Perhaps one of the most important decisions you face is your choice for future medical care. Take time to talk with your family and your healthcare team and start planning today.  Advance Care Planning is a process that can help you:  Understand possible future healthcare decisions in light of your own experiences  Reflect on those decision in light of your goals and values  Discuss your decisions with those closest to you and the healthcare professionals that care for you  Make a plan by creating a document that reflects your wishes    Surrogate Decision Maker  In the event of a medical emergency, which has left you unable to communicate or to make your own decisions, you would need someone to make decisions for you.  It is important to discuss your preferences for medical treatment with this person while you are in good health.     Qualities of a surrogate decision maker:  Willing to take on this role and responsibility  Knows what you want for future medical care  Willing to follow your wishes even if they don't agree with them  Able to make difficult medical decisions under stressful circumstances    Advance Directives  These are legal documents you can create that will guide your healthcare team and decision maker(s) when needed. These documents can be stored in the electronic medical record.    Living Will - a legal document to guide your care if you have a terminal condition or a serious illness and are unable to communicate. States vary by statute in document names/types, but most forms may include one or more of the following:        -  Directions regarding life-prolonging treatments        -  Directions regarding artificially provided nutrition/hydration        -  Choosing a healthcare decision maker        -  Direction regarding organ/tissue  donation    Durable Power of  for Healthcare - this document names an -in-fact to make medical decisions for you, but it may also allow this person to make personal and financial decisions for you. Please seek the advice of an  if you need this type of document.    **Advance Directives are not required and no one may discriminate against you if you do not sign one.    Medical Orders  Many states allow specific forms/orders signed by your physician to record your wishes for medical treatment in your current state of health. This form, signed in personal communication with your physician, addresses resuscitation and other medical interventions that you may or may not want.      For more information or to schedule a time with a [facility] Advance Care Planning Facilitator contact: [facility] or call [phone] and someone will contact you directly.  Fall Prevention in the Home, Adult  Falls can cause injuries and affect people of all ages. There are many simple things that you can do to make your home safe and to help prevent falls.  If you need it, ask for help making these changes.  What actions can I take to prevent falls?  General information  Use good lighting in all rooms. Make sure to:  Replace any light bulbs that burn out.  Turn on lights if it is dark and use night-lights.  Keep items that you use often in easy-to-reach places. Lower the shelves around your home if needed.  Move furniture so that there are clear paths around it.  Do not keep throw rugs or other things on the floor that can make you trip.  If any of your floors are uneven, fix them.  Add color or contrast paint or tape to clearly mckinley and help you see:  Grab bars or handrails.  First and last steps of staircases.  Where the edge of each step is.  If you use a ladder or stepladder:  Make sure that it is fully opened. Do not climb a closed ladder.  Make sure the sides of the ladder are locked in  place.  Have someone hold the ladder while you use it.  Know where your pets are as you move through your home.  What can I do in the bathroom?         Keep the floor dry. Clean up any water that is on the floor right away.  Remove soap buildup in the bathtub or shower. Buildup makes bathtubs and showers slippery.  Use non-skid mats or decals on the floor of the bathtub or shower.  Attach bath mats securely with double-sided, non-slip rug tape.  If you need to sit down while you are in the shower, use a non-slip stool.  Install grab bars by the toilet and in the bathtub and shower. Do not use towel bars as grab bars.  What can I do in the bedroom?  Make sure that you have a light by your bed that is easy to reach.  Do not use any sheets or blankets on your bed that hang to the floor.  Have a firm bench or chair with side arms that you can use for support when you get dressed.  What can I do in the kitchen?  Clean up any spills right away.  If you need to reach something above you, use a sturdy step stool that has a grab bar.  Keep electrical cables out of the way.  Do not use floor polish or wax that makes floors slippery.  What can I do with my stairs?  Do not leave anything on the stairs.  Make sure that you have a light switch at the top and the bottom of the stairs. Have them installed if you do not have them.  Make sure that there are handrails on both sides of the stairs. Fix handrails that are broken or loose. Make sure that handrails are as long as the staircases.  Install non-slip stair treads on all stairs in your home if they do not have carpet.  Avoid having throw rugs at the top or bottom of stairs, or secure the rugs with carpet tape to prevent them from moving.  Choose a carpet design that does not hide the edge of steps on the stairs. Make sure that carpet is firmly attached to the stairs. Fix any carpet that is loose or worn.  What can I do on the outside of my home?  Use bright outdoor  lighting.  Repair the edges of walkways and driveways and fix any cracks. Clear paths of anything that can make you trip, such as tools or rocks.  Add color or contrast paint or tape to clearly mckinley and help you see high doorway thresholds.  Trim any bushes or trees on the main path into your home.  Check that handrails are securely fastened and in good repair. Both sides of all steps should have handrails.  Install guardrails along the edges of any raised decks or porches.  Have leaves, snow, and ice cleared regularly. Use sand, salt, or ice melt on walkways during winter months if you live where there is ice and snow.  In the garage, clean up any spills right away, including grease or oil spills.  What other actions can I take?  Review your medicines with your health care provider. Some medicines can make you confused or feel dizzy. This can increase your chance of falling.  Wear closed-toe shoes that fit well and support your feet. Wear shoes that have rubber soles and low heels.  Use a cane, walker, scooter, or crutches that help you move around if needed.  Talk with your provider about other ways that you can decrease your risk of falls. This may include seeing a physical therapist to learn to do exercises to improve movement and strength.  Where to find more information  Centers for Disease Control and PreventionDESI: cdc.gov  National [place] on Aging: dino.nih.gov  National [place] on Aging: dino.nih.gov  Contact a health care provider if:  You are afraid of falling at home.  You feel weak, drowsy, or dizzy at home.  You fall at home.  Get help right away if you:  Lose consciousness or have trouble moving after a fall.  Have a fall that causes a head injury.  These symptoms may be an emergency. Get help right away. Call 911.  Do not wait to see if the symptoms will go away.  Do not drive yourself to the hospital.  This information is not intended to replace advice given to you by your health care  provider. Make sure you discuss any questions you have with your health care provider.  Document Revised: 08/21/2023 Document Reviewed: 08/21/2023  Elsevier Patient Education © 2024 [employer]  Sit-to-Stand Exercise    The sit-to-stand exercise (also known as the chair stand or chair rise exercise) strengthens your lower body and helps you maintain or improve your mobility and independence. The end goal is to do the sit-to-stand exercise without using your hands. This will be easier as you become stronger. You should always talk with your health care provider before starting any exercise program, especially if you have had recent surgery.  Do the exercise exactly as told by your health care provider and adjust it as directed. It is normal to feel mild stretching, pulling, tightness, or discomfort as you do this exercise, but you should stop right away if you feel sudden pain or your pain gets worse. Do not begin doing this exercise until told by your health care provider.  What the sit-to-stand exercise does  The sit-to-stand exercise helps to strengthen the muscles in your thighs and the muscles in the center of your body that give you stability (core muscles). This exercise is especially helpful if:  You have had knee or hip surgery.  You have trouble getting up from a chair, out of a car, or off the toilet due to muscle weakness.  How to do the sit-to-stand exercise  Sit toward the front edge of a sturdy chair without armrests. Your knees should be bent and your feet should be flat on the floor and shoulder-width apart and underneath your hips.  Place your hands lightly on each side of the seat. Keep your back and neck as straight as possible, with your chest slightly forward.  Breathe in slowly. Lean forward and slightly shift your weight to the front of your feet.  Breathe out as you slowly stand up. Try not to support any weight with your hands.  Stand and pause for a full breath in and out.  Breathe in as  you sit down slowly. Tighten your core and abdominal muscles to control your lowering as much as possible. You should lower yourself back to the chair slowly, not just drop back into the seat.  Breathe out slowly.  Do this exercise 10-15 times. If needed, do it fewer times until you build up strength.  Rest for 1 minute, then do another set of 10-15 repetitions.  To change the difficulty of the sit-to-stand exercise  If the exercise is too difficult, use a chair with sturdy armrests, and push off the armrests to help you come to the standing position. You can also use the armrests to help slowly lower yourself back to sitting. As this gets easier, try to use your arms less. You can also place a firm cushion or pillow on the chair to make the surface higher.  If this exercise is too easy, do not use your arms to help raise or lower yourself. You can also wear a weighted vest, use hand weights, increase your repetitions, or try a lower chair.  General tips  You may feel tired when starting an exercise routine. This is normal.  You may have muscle soreness that lasts a few days. This is normal. As you get stronger, you may not feel muscle soreness.  Use smooth, steady movements.  Do not  hold your breath during strength exercises. This can cause unsafe changes in your blood pressure.  Breathe in slowly through your nose, and breathe out slowly through your mouth.  Summary  Strengthening your lower body is an important step to help you move safely and independently.  The sit-to-stand exercise helps strengthen the muscles in your thighs and core.  You should always talk with your health care provider before starting any exercise program, especially if you have had recent surgery.  This information is not intended to replace advice given to you by your health care provider. Make sure you discuss any questions you have with your health care provider.  Document Revised: 04/10/2022 Document Reviewed: 04/10/2022  [employer]  Patient Education ©  [employer]  You are due for Shingrix vaccination series ( the newest shingles vaccine).  It is a two shot series spaced 2-6 months apart. Please get this vaccine series started at your earliest convenience at your local pharmacy to help avoid shingles outbreak. It is more effective than the old Zostavax vaccine and is recommended even if you have had the Zostavax vaccine in the past.  Once the Shingrix series is completed, it does not need to be repeated.   For more information, please look at the website below:  https://www.cdc.gov/vaccines/vpd/shingles/public/shingrix/index.html    You are due for RSV vaccination. (provides protection against Respiratory Syncytial Virus Infection) Please  get the immunization at your local pharmacy at your earliest convenience. This immunization is currently a one time vaccine only. Please click on the link for more information about this vaccine.   https://www.cdc.gov/rsv/vaccines/older-adults.html    You are due for a Covid 19 vaccination. (provides protection against Covid 19 Viral Infection) Please  talk to your pharmacist and get the immunization at your local pharmacy at your earliest convenience. Please click on the link for more information about this vaccine.   https://www.cdc.gov/coronavirus/2019-ncov/vaccines/stay-up-to-date.html        Medicare Wellness  Personal Prevention Plan of Service     Date of Office Visit:    Encounter Provider:  Jackelyn Tidwell MD  Place of Service:  [facility]  Patient Name: Alison Colvin  :  1950    As part of the Medicare Wellness portion of your visit today, we are providing you with this personalized preventive plan of services (PPPS). This plan is based upon recommendations of the [place] Preventive Services Task Force (USPSTF) and the Advisory Committee on Immunization Practices (ACIP).    This lists the preventive care services that should be considered, and  provides dates of when you are due. Items listed as completed are up-to-date and do not require any further intervention.    Health Maintenance   Topic Date Due   • ZOSTER VACCINE (1 of 2) Never done   • RSV Vaccine - Adults (1 - 1-dose 75+ series) Never done   • INFLUENZA VACCINE  07/01/2024   • COVID-19 Vaccine (4 - 2024-25 season) 09/01/2024   • ANNUAL WELLNESS VISIT  12/06/2024   • BMI FOLLOWUP  12/06/2024   • LIPID PANEL  03/05/2026   • DXA SCAN  05/29/2026   • TDAP/TD VACCINES (2 - Td or Tdap) 01/25/2029   • HEPATITIS C SCREENING  Completed   • MAMMOGRAM  Discontinued   • COLORECTAL CANCER SCREENING  Discontinued       No orders of the defined types were placed in this encounter.      No follow-ups on file.

## 2025-03-12 NOTE — ASSESSMENT & PLAN NOTE
Normal TSH and free T4.  Clinically stable and asymptomatic  Continue on levothyroxine 75 mcg daily  To be reassess in 1 year

## 2025-03-12 NOTE — ASSESSMENT & PLAN NOTE
Clinically stable.    Most recent PT/INR 2.0, within therapeutic range.  Continue on warfarin therapy    Orders:    warfarin (COUMADIN) 4 MG tablet; Take 1 tablet by mouth Every Night. Takes 1-1/2 tablet every night on Monday Wednesday and Friday and 1 tablet every Tuesday, Thursday, Saturday and Sunday

## 2025-03-12 NOTE — PROGRESS NOTES
Subjective   The ABCs of the Annual Wellness Visit  Medicare Wellness Visit      Alison Colvin is a 74 y.o. patient who presents for a Medicare Wellness Visit.    The following portions of the patient's history were reviewed and   updated as appropriate: allergies, current medications, past family history, past medical history, past social history, past surgical history, and problem list.    Compared to one year ago, the patient's physical   health is better.  Compared to one year ago, the patient's mental   health is the same.    Recent Hospitalizations:  She was not admitted to the hospital during the last year.     Current Medical Providers:  Patient Care Team:  Jackelyn Tidwell MD as PCP - General (Internal Medicine)    Outpatient Medications Prior to Visit   Medication Sig Dispense Refill    allopurinol (ZYLOPRIM) 100 MG tablet Take 1 tablet by mouth Daily. 90 tablet 3    ALPRAZolam (Xanax) 0.5 MG tablet Take 1 tablet by mouth At Night As Needed for Anxiety or Sleep. 30 tablet 0    atorvastatin (LIPITOR) 40 MG tablet Take 1 tablet by mouth Daily. 90 tablet 3    baclofen (LIORESAL) 10 MG tablet TAKE 1 TABLET BY MOUTH TWICE  DAILY 180 tablet 1    BROVANA 15 MCG/2ML nebulizer solution USE 1 VIAL PER NEBULIZER BID  5    budesonide (PULMICORT) 1 MG/2ML nebulizer solution Pulmicort SUSP; Patient Sig: Pulmicort SUSP USE 1 UNIT DOSE VIA NEBULIZER TWICE DAILY; 0; 25-Mar-2013; Active      calcium carbonate (OS-SEBASTIÁN) 600 MG tablet Take 1 tablet by mouth 2 (Two) Times a Day With Meals.      DULoxetine (CYMBALTA) 60 MG capsule TAKE 1 CAPSULE BY MOUTH DAILY 90 capsule 3    furosemide (LASIX) 20 MG tablet TAKE 1 TABLET BY MOUTH DAILY 90 tablet 3    HYDROcodone-acetaminophen (NORCO)  MG per tablet Take 1 tablet by mouth 3 times a day.      ipratropium-albuterol (DUO-NEB) 0.5-2.5 mg/mL nebulizer USE 1 VIAL PER NEBULIZER QID  5    levothyroxine (SYNTHROID, LEVOTHROID) 75 MCG tablet TAKE 1 TABLET BY MOUTH DAILY 90 tablet  3    metoclopramide (REGLAN) 10 MG tablet Take 1 tablet by mouth 4 (Four) Times a Day. 360 tablet 3    Multiple Vitamins-Minerals (WOMENS 50+ MULTI VITAMIN/MIN PO) Take 1 tablet by mouth Daily.      nystatin (MYCOSTATIN) 100,000 unit/mL suspension Swish and swallow 5 mL 4 (Four) Times a Day. 473 mL 0    nystatin (MYCOSTATIN) 178863 UNIT/GM powder Apply  topically to the appropriate area as directed Every 12 (Twelve) Hours. 15 g 0    OXYGEN-HELIUM IN Inhale.      pantoprazole (PROTONIX) 40 MG EC tablet TAKE 1 TABLET BY MOUTH DAILY 90 tablet 3    potassium chloride (KLOR-CON M20) 20 MEQ CR tablet TAKE 1 TABLET BY MOUTH TWICE  DAILY 180 tablet 3    rOPINIRole (REQUIP) 1 MG tablet Take 2 tablets by mouth Every Night. Take 1 hour before bedtime. 180 tablet 3    topiramate (TOPAMAX) 25 MG tablet TAKE 1 TABLET BY MOUTH TWICE  DAILY 180 tablet 3    Umeclidinium-Vilanterol (Anoro Ellipta) 62.5-25 MCG/ACT aerosol powder  inhaler Inhale 1 puff Daily. 60 each 11    warfarin (COUMADIN) 1 MG tablet Take 1 mg tablet Monday Wednesday Friday in addition to 4 mg tablet daily 30 tablet 3    warfarin (COUMADIN) 4 MG tablet Take 1 tablet by mouth Every Night. 90 tablet 3     No facility-administered medications prior to visit.     Opioid medication/s are on active medication list.  and I have evaluated her active treatment plan and pain score trends (see table).  Vitals:    03/12/25 0900   PainSc: 8    PainLoc: Back     I have reviewed the chart for potential of high risk medication and harmful drug interactions in the elderly.        Aspirin is not on active medication list.  Aspirin use is not indicated based on review of current medical condition/s. Risk of harm outweighs potential benefits.  .    Patient Active Problem List   Diagnosis    Anemia    Asthma    Benign essential hypertension    Chronic venous insufficiency    Depression    Degeneration of intervertebral disc of lumbosacral region    Duodenal ulcer    Gastroesophageal  "reflux disease    Fibromyalgia    Gastric ulcer    Gastroparesis    Hypothyroidism    Migraine    Peripheral neuropathy    Restrictive lung disease    Urinary incontinence    Edema of leg    Osteoarthritis of both hands    Gout with tophus    PAF (paroxysmal atrial fibrillation)    History of pulmonary embolism    History of deep vein thrombophlebitis of lower extremity    History of inferior vena caval filter placement    Inferior vena cava occlusion    Nocturnal hypoxia    Obstructive sleep apnea syndrome    Colon cancer    Diastolic dysfunction    History of hysterectomy with oophorectomy    IBS (irritable bowel syndrome)    Osteoarthritis of lumbar spine    Pulmonary hypertension    S/P partial colectomy    Stroke, thrombotic    Age-related osteoporosis without current pathological fracture    Mixed hyperlipidemia    Nocturnal leg cramps    Encounter for power mobility device assessment    On warfarin therapy    COPD (chronic obstructive pulmonary disease)     Advance Care Planning Advance Directive is not on file.  ACP discussion was held with the patient during this visit. Patient has an advance directive (not in EMR), copy requested.            Objective   Vitals:    03/12/25 0900   BP: 118/64   BP Location: Right arm   Patient Position: Sitting   Cuff Size: Adult   Pulse: 65   Resp: 16   Temp: 97.5 °F (36.4 °C)   TempSrc: Temporal   SpO2: 98%   Weight: 78.5 kg (173 lb)   Height: 157.5 cm (62.01\")   PainSc: 8    PainLoc: Back       Estimated body mass index is 31.63 kg/m² as calculated from the following:    Height as of this encounter: 157.5 cm (62.01\").    Weight as of this encounter: 78.5 kg (173 lb).    BMI is >= 30 and <35. (Class 1 Obesity). The following options were offered after discussion;: weight loss educational material (shared in after visit summary), exercise counseling/recommendations, and nutrition counseling/recommendations           Does the patient have evidence of cognitive impairment? " No  Lab Results   Component Value Date    CHLPL 150 03/05/2025    TRIG 164 (H) 03/05/2025    HDL 71 (H) 03/05/2025    LDL 52 03/05/2025    VLDL 27 03/05/2025     Cryotherapy, Skin Lesion    Date/Time: 3/12/2025 9:52 AM    Performed by: Jackelyn Tidwell MD  Authorized by: Jackelyn Tidwell MD  Consent: Verbal consent obtained  Patient identity confirmed: verbally with patient  Local anesthesia used: no    Anesthesia:  Local anesthesia used: no    Sedation:  Patient sedated: no    Patient tolerance: patient tolerated the procedure well with no immediate complications                                                                                                Health  Risk Assessment    Smoking Status:  Social History     Tobacco Use   Smoking Status Never    Passive exposure: Never   Smokeless Tobacco Never     Alcohol Consumption:  Social History     Substance and Sexual Activity   Alcohol Use No       Fall Risk Screen  STEADI Fall Risk Assessment was completed, and patient is at MODERATE risk for falls. Assessment completed on:3/12/2025    Depression Screening   Little interest or pleasure in doing things? Not at all   Feeling down, depressed, or hopeless? Not at all   PHQ-2 Total Score 0      Health Habits and Functional and Cognitive Screening:      3/12/2025     8:57 AM   Functional & Cognitive Status   Do you have difficulty preparing food and eating? Yes   Do you have difficulty bathing yourself, getting dressed or grooming yourself? Yes   Do you have difficulty using the toilet? No   Do you have difficulty moving around from place to place? Yes   Do you have trouble with steps or getting out of a bed or a chair? Yes   Current Diet Low Fat Diet   Dental Exam Up to date   Eye Exam Not up to date   Exercise (times per week) 0 times per week   Current Exercises Include No Regular Exercise   Do you need help using the phone?  No   Are you deaf or do you have serious difficulty hearing?  Yes   Do you need help to  go to places out of walking distance? Yes   Do you need help shopping? Yes   Do you need help preparing meals?  Yes   Do you need help with housework?  Yes   Do you need help with laundry? Yes   Do you need help taking your medications? No   Do you need help managing money? No   Do you ever drive or ride in a car without wearing a seat belt? No   Have you felt unusual stress, anger or loneliness in the last month? No   Who do you live with? Spouse   If you need help, do you have trouble finding someone available to you? No   Have you been bothered in the last four weeks by sexual problems? No   Do you have difficulty concentrating, remembering or making decisions? Yes           Age-appropriate Screening Schedule:  Refer to the list below for future screening recommendations based on patient's age, sex and/or medical conditions. Orders for these recommended tests are listed in the plan section. The patient has been provided with a written plan.    Health Maintenance List  Health Maintenance   Topic Date Due    ZOSTER VACCINE (1 of 2) Never done    RSV Vaccine - Adults (1 - 1-dose 75+ series) Never done    ANNUAL WELLNESS VISIT  12/06/2024    BMI FOLLOWUP  12/06/2024    COVID-19 Vaccine (5 - 2024-25 season) 04/18/2025    LIPID PANEL  03/05/2026    DXA SCAN  05/29/2026    TDAP/TD VACCINES (2 - Td or Tdap) 01/25/2029    HEPATITIS C SCREENING  Completed    INFLUENZA VACCINE  Completed    MAMMOGRAM  Discontinued    COLORECTAL CANCER SCREENING  Discontinued                                                                                                                                                CMS Preventative Services Quick Reference  Risk Factors Identified During Encounter  Hearing Problem: Referral to Audiologist ordered  Immunizations Discussed/Encouraged: RSV (Respiratory Syncytial Virus)  Vision Screening Recommended    The above risks/problems have been discussed with the patient.  Pertinent information has  "been shared with the patient in the After Visit Summary.  An After Visit Summary and PPPS were made available to the patient.    Follow Up:   Next Medicare Wellness visit to be scheduled in 1 year.         Additional E&M Note during same encounter follows:  Patient has additional, significant, and separately identifiable condition(s)/problem(s) that require work above and beyond the Medicare Wellness Visit     Chief Complaint  Medicare Wellness-subsequent    Subjective   HPI  BK is also being seen today for routine follow-up of HLD, stage III CKD, hypothyroidism, and A-fib.  She reports a small skin lesion/mole on the forearm that has been there for many years however recently has become very itchy and disturbing for her.                 Objective   Vital Signs:  /64 (BP Location: Right arm, Patient Position: Sitting, Cuff Size: Adult)   Pulse 65   Temp 97.5 °F (36.4 °C) (Temporal)   Resp 16   Ht 157.5 cm (62.01\")   Wt 78.5 kg (173 lb)   SpO2 98%   BMI 31.63 kg/m²   Physical Exam  Constitutional:       Appearance: Normal appearance.   HENT:      Head: Normocephalic and atraumatic.   Cardiovascular:      Heart sounds: Normal heart sounds.   Pulmonary:      Breath sounds: Normal breath sounds.   Abdominal:      General: Bowel sounds are normal.      Palpations: Abdomen is soft.   Skin:            Comments: Pedunculated hyperpigmented skin lesion on the forearm, with no surrounding tenderness, swelling or erythema.   Neurological:      Mental Status: She is alert and oriented to person, place, and time.         The following data was reviewed by: Jackelyn Tidwell MD on 03/12/2025:    Common labs          9/4/2024    10:21 3/5/2025    09:43   Common Labs   Glucose 90  93    BUN 18  23    Creatinine 1.40  1.36    Sodium 138  139    Potassium 4.1  4.6    Chloride 101  103    Calcium 9.5  9.2    Albumin 4.1  3.9    Total Bilirubin 0.3  0.3    Alkaline Phosphatase 64  51    AST (SGOT) 17  19    ALT (SGPT) 15 "  23    WBC  8.64    Hemoglobin  11.8    Hematocrit  37.2    Platelets  196    Total Cholesterol 129  150    Triglycerides 169  164    HDL Cholesterol 51  71    LDL Cholesterol  50  52      TSH          9/4/2024    10:21 3/5/2025    09:43   TSH   TSH 2.430  2.320           Assessment and Plan Additional age appropriate preventative wellness advice topics were discussed during today's preventative wellness exam(some topics already addressed during AWV portion of the note above):    Physical Activity: Advised cardiovascular activity 150 minutes per week as tolerated. (example brisk walk for 30 minutes, 5 days a week).     Nutrition: Discussed nutrition plan with patient. Information shared in after visit summary. Goal is for a well balanced diet to enhance overall health.     Healthy Weight: Discussed current and goal BMI with patient. Steps to attain this goal discussed. Information shared in after visit summary.     Medicare annual wellness visit, subsequent  Discussed the importance of maintaining a healthy weight and getting regular exercise.  Educated patient on the benefits of healthy diet.  Advise follow-up annually for wellness exams.       At moderate risk for fall  At home fall prevention instructions given       Immunization due  Up-to-date with COVID and flu vaccination  Recommended RSV       Chronic obstructive pulmonary disease, unspecified COPD type  COPD is stable.    Plan:  Continue same medication/s without change.      Patient Treatment Goals:   symptom prevention, minimizing limitation in activity, prevention of exacerbations and use of ER/inpatient care, maintenance of optimal pulmonary function, and minimization of adverse effects of treatment    Followup in 6 months.         PAF (paroxysmal atrial fibrillation)  Clinically stable.    Most recent PT/INR 2.0, within therapeutic range.  Continue on warfarin therapy    Orders:    warfarin (COUMADIN) 4 MG tablet; Take 1 tablet by mouth Every Night.  Takes 1-1/2 tablet every night on Monday Wednesday and Friday and 1 tablet every Tuesday, Thursday, Saturday and Sunday    Chronic renal impairment, stage 3b  Renal condition is stable.  Continue current treatment regimen.  Renal condition will be reassessed in 6 months.         Acquired hypothyroidism  Normal TSH and free T4.  Clinically stable and asymptomatic  Continue on levothyroxine 75 mcg daily  To be reassess in 1 year       Decreased hearing of both ears    Orders:    Ambulatory Referral to Audiology    Verruca vulgaris    Orders:    Cryotherapy, Skin Lesion            Follow Up   Return in about 6 months (around 9/12/2025) for Follow up with fasting labs.  Patient was given instructions and counseling regarding her condition or for health maintenance advice. Please see specific information pulled into the AVS if appropriate.     no

## 2025-03-12 NOTE — ASSESSMENT & PLAN NOTE
Renal condition is stable.  Continue current treatment regimen.  Renal condition will be reassessed in 6 months.

## 2025-03-12 NOTE — ASSESSMENT & PLAN NOTE
COPD is stable.    Plan:  Continue same medication/s without change.      Patient Treatment Goals:   symptom prevention, minimizing limitation in activity, prevention of exacerbations and use of ER/inpatient care, maintenance of optimal pulmonary function, and minimization of adverse effects of treatment    Followup in 6 months.

## 2025-03-15 DIAGNOSIS — M79.7 FIBROMYALGIA: ICD-10-CM

## 2025-03-17 RX ORDER — BACLOFEN 10 MG/1
10 TABLET ORAL 2 TIMES DAILY
Qty: 180 TABLET | Refills: 1 | Status: SHIPPED | OUTPATIENT
Start: 2025-03-17

## 2025-03-19 ENCOUNTER — TELEPHONE (OUTPATIENT)
Dept: FAMILY MEDICINE CLINIC | Facility: CLINIC | Age: 75
End: 2025-03-19
Payer: MEDICARE

## 2025-03-24 DIAGNOSIS — F41.9 ANXIETY-LIKE SYMPTOMS: ICD-10-CM

## 2025-03-24 NOTE — TELEPHONE ENCOUNTER
"     Caller: Alison Colvin \"BK\"    Relationship: Self    Best call back number: [phone]     Requested Prescriptions:   Requested Prescriptions     Pending Prescriptions Disp Refills    ALPRAZolam (Xanax) 0.5 MG tablet 30 tablet 0     Sig: Take 1 tablet by mouth At Night As Needed for Anxiety or Sleep.        Pharmacy where request should be sent: [facility] - [place] [address] [facility] [phone] [facility] [phone]      Last office visit with prescribing clinician: 3/12/2025   Last telemedicine visit with prescribing clinician: Visit date not found   Next office visit with prescribing clinician: 9/24/2025     Additional details provided by patient:      Does the patient have less than a 3 day supply:  [x] Yes  [] No    Would you like a call back once the refill request has been completed: [x] Yes [] No    If the office needs to give you a call back, can they leave a voicemail: [x] Yes [] No    Dot Perales Rep   03/24/25 15:29 EDT        "

## 2025-03-26 ENCOUNTER — TELEPHONE (OUTPATIENT)
Dept: FAMILY MEDICINE CLINIC | Facility: CLINIC | Age: 75
End: 2025-03-26

## 2025-04-07 ENCOUNTER — TELEPHONE (OUTPATIENT)
Dept: FAMILY MEDICINE CLINIC | Facility: CLINIC | Age: 75
End: 2025-04-07
Payer: MEDICARE

## 2025-04-09 ENCOUNTER — TELEPHONE (OUTPATIENT)
Dept: FAMILY MEDICINE CLINIC | Facility: CLINIC | Age: 75
End: 2025-04-09

## 2025-04-16 ENCOUNTER — TELEPHONE (OUTPATIENT)
Dept: FAMILY MEDICINE CLINIC | Facility: CLINIC | Age: 75
End: 2025-04-16

## 2025-04-16 NOTE — TELEPHONE ENCOUNTER
[facility] CALLED TO ALERT THE PROVIDER OF AN OUT OF RANGE INR BUT DURING THE TRANSFER PROCESS THEY HUNG UP BEFORE I COULD REACH THE OFFICE. DID NOT GET A CALL BACK NUMBER, I'M SORRY.

## 2025-04-23 ENCOUNTER — TELEPHONE (OUTPATIENT)
Dept: FAMILY MEDICINE CLINIC | Facility: CLINIC | Age: 75
End: 2025-04-23

## 2025-05-01 RX ORDER — ALPRAZOLAM 0.5 MG
0.5 TABLET ORAL NIGHTLY PRN
Qty: 30 TABLET | Refills: 0 | Status: SHIPPED | OUTPATIENT
Start: 2025-05-01

## 2025-05-13 ENCOUNTER — OFFICE VISIT (OUTPATIENT)
Dept: FAMILY MEDICINE CLINIC | Facility: CLINIC | Age: 75
End: 2025-05-13
Payer: MEDICARE

## 2025-05-13 ENCOUNTER — TELEPHONE (OUTPATIENT)
Dept: FAMILY MEDICINE CLINIC | Facility: CLINIC | Age: 75
End: 2025-05-13

## 2025-05-13 VITALS
WEIGHT: 167.2 LBS | HEART RATE: 71 BPM | HEIGHT: 62 IN | OXYGEN SATURATION: 96 % | SYSTOLIC BLOOD PRESSURE: 128 MMHG | DIASTOLIC BLOOD PRESSURE: 80 MMHG | BODY MASS INDEX: 30.77 KG/M2 | TEMPERATURE: 97.3 F | RESPIRATION RATE: 16 BRPM

## 2025-05-13 DIAGNOSIS — T14.8XXA MUSCLE STRAIN: Primary | ICD-10-CM

## 2025-05-13 DIAGNOSIS — L98.9 BENIGN SKIN LESION OF FACE: ICD-10-CM

## 2025-05-13 NOTE — PROGRESS NOTES
"Chief Complaint  Leg Pain (Leg pain/knot in groin for about a week, spot on face for 2 years ??? Skin cancer)    Subjective        Alison Colvin presents to [facility]  Leg Pain         History of Present Illness  The patient is a 74-year-old female who presents for evaluation of a hard knot in her left groin.    She reports the presence of a hard, painful knot in her left groin, which she has been aware of for approximately one week. The pain intensifies at night, particularly when she is at rest, and is accompanied by a throbbing sensation in her leg. She suspects that the discomfort may be due to a pulled muscle. There has been no recent trauma or falls, and no changes in skin condition. Her mobility is limited, with her only walking to the bathroom and relying on a mobile chair for most of her daily activities. She is currently on a regimen of pain medication, taken three times daily, and a muscle relaxant.  She reports pain is improving.    She has a spot on her skin that has been present for about 2 years. She has been applying lotion and antibiotic cream on it, but it does not go away.        Objective   Vital Signs:  /80 (BP Location: Right arm, Patient Position: Sitting, Cuff Size: Adult)   Pulse 71   Temp 97.3 °F (36.3 °C) (Temporal)   Resp 16   Ht 157.5 cm (62.01\")   Wt 75.8 kg (167 lb 3.2 oz)   SpO2 96%   BMI 30.57 kg/m²   Estimated body mass index is 30.57 kg/m² as calculated from the following:    Height as of this encounter: 157.5 cm (62.01\").    Weight as of this encounter: 75.8 kg (167 lb 3.2 oz).            Physical Exam  Musculoskeletal:      Left lower leg: Swelling present. No tenderness.        Legs:       Comments: Small non-tender firm immobile lump on the left groin area that is a little sore with hip adduction. No overlying skin changes.   Skin:     Comments: Yellow-pigmented lesion on the left side of the face below the eye. Non tender, no " erythema or swelling.        Result Review :                Assessment and Plan   Diagnoses and all orders for this visit:    1. Muscle strain (Primary)    2. Benign skin lesion of face        Assessment & Plan  1. Muscle sprain.  - The pain is likely due to a muscle sprain in the left groin area.  - Physical examination revealed a knot that is slightly painful to touch but not severe.  - Advised to apply ice or a heating pad to the affected area to alleviate swelling and discomfort.  - Pain medication should be taken as needed. If there is no improvement, she should inform the clinic.    2. Benign skin lesion.  - She reports a spot on her skin that has been present for about 2 years.  - Physical exam revealed yellow pigmented skin lesion below the left eye  -This looks like a benign skin lesion.  Will continue to monitor for now and watch out for any acute changes.      Follow-up  - The patient will follow up in September 2025.           Follow Up   Return for Next scheduled follow up.  Patient was given instructions and counseling regarding her condition or for health maintenance advice. Please see specific information pulled into the AVS if appropriate.     Patient or patient representative verbalized consent for the use of Ambient Listening during the visit with  Jackelyn Tidwell MD for chart documentation. 5/13/2025  10:39 EDT

## 2025-05-13 NOTE — TELEPHONE ENCOUNTER
Caller: MITUL BARRAZA    Relationship: Other    Best call back number: [phone]    What was the call regarding: REPORTING OUT OF RANGE INR. IT WAS 1.4 FOR YESTERDAY 05/12/25.

## 2025-05-19 ENCOUNTER — TELEPHONE (OUTPATIENT)
Dept: FAMILY MEDICINE CLINIC | Facility: CLINIC | Age: 75
End: 2025-05-19

## 2025-05-19 DIAGNOSIS — R10.32 LEFT GROIN PAIN: Primary | ICD-10-CM

## 2025-05-19 NOTE — TELEPHONE ENCOUNTER
PATIENT CALLED AND STATES HER LEFT HIP BONE POPPED AS SHE WAS GETTING INTO BED ON 5/13/25    SHE IS IN PAIN AND IT BURNS, BUT SHE CAN WALK.    SHE HAS RIDE TO [employer] TOMORROW.    SHE WOULD LIKE TO HAVE ORDER SENT TO [facility]    CALL BACK NUMBER [phone]

## 2025-05-21 ENCOUNTER — TELEPHONE (OUTPATIENT)
Dept: FAMILY MEDICINE CLINIC | Facility: CLINIC | Age: 75
End: 2025-05-21

## 2025-05-22 ENCOUNTER — HOSPITAL ENCOUNTER (OUTPATIENT)
Dept: GENERAL RADIOLOGY | Facility: HOSPITAL | Age: 75
Discharge: HOME | End: 2025-05-22
Admitting: INTERNAL MEDICINE
Payer: MEDICARE

## 2025-05-22 PROCEDURE — 73502 X-RAY EXAM HIP UNI 2-3 VIEWS: CPT

## 2025-06-03 DIAGNOSIS — M1A.9XX1 GOUT WITH TOPHUS: ICD-10-CM

## 2025-06-04 ENCOUNTER — TELEPHONE (OUTPATIENT)
Dept: FAMILY MEDICINE CLINIC | Facility: CLINIC | Age: 75
End: 2025-06-04
Payer: MEDICARE

## 2025-06-04 RX ORDER — ALLOPURINOL 100 MG/1
100 TABLET ORAL DAILY
Qty: 90 TABLET | Refills: 3 | Status: SHIPPED | OUTPATIENT
Start: 2025-06-04

## 2025-06-09 RX ORDER — METOCLOPRAMIDE 10 MG/1
10 TABLET ORAL 4 TIMES DAILY
Qty: 360 TABLET | Refills: 3 | OUTPATIENT
Start: 2025-06-09

## 2025-06-18 ENCOUNTER — TELEPHONE (OUTPATIENT)
Dept: FAMILY MEDICINE CLINIC | Facility: CLINIC | Age: 75
End: 2025-06-18

## 2025-06-22 DIAGNOSIS — G47.62 NOCTURNAL LEG CRAMPS: ICD-10-CM

## 2025-06-23 RX ORDER — ROPINIROLE 1 MG/1
2 TABLET, FILM COATED ORAL NIGHTLY
Qty: 180 TABLET | Refills: 3 | Status: SHIPPED | OUTPATIENT
Start: 2025-06-23

## 2025-06-25 ENCOUNTER — TELEPHONE (OUTPATIENT)
Dept: FAMILY MEDICINE CLINIC | Facility: CLINIC | Age: 75
End: 2025-06-25

## 2025-06-25 NOTE — TELEPHONE ENCOUNTER
Caller: ARPAN (MDINKATY)    Relationship:     Best call back number: [phone]     What is the best time to reach you: ANY    Who are you requesting to speak with (clinical staff, provider,  specific staff member): CLINICAL STAFF     Do you know the name of the person who called:      What was the call regarding: CALLER IS REPORTING AN OUT OF RANGE INR : 1.5 TAKEN ON 06/24/25    Is it okay if the provider responds through MyChart:

## 2025-07-08 ENCOUNTER — TELEPHONE (OUTPATIENT)
Dept: FAMILY MEDICINE CLINIC | Facility: CLINIC | Age: 75
End: 2025-07-08

## 2025-07-09 DIAGNOSIS — Z12.31 BREAST CANCER SCREENING BY MAMMOGRAM: Primary | ICD-10-CM

## 2025-07-14 ENCOUNTER — OFFICE VISIT (OUTPATIENT)
Dept: FAMILY MEDICINE CLINIC | Facility: CLINIC | Age: 75
End: 2025-07-14
Payer: MEDICARE

## 2025-07-14 VITALS
OXYGEN SATURATION: 95 % | DIASTOLIC BLOOD PRESSURE: 68 MMHG | TEMPERATURE: 97.1 F | WEIGHT: 172 LBS | SYSTOLIC BLOOD PRESSURE: 120 MMHG | HEIGHT: 62 IN | BODY MASS INDEX: 31.65 KG/M2 | RESPIRATION RATE: 16 BRPM | HEART RATE: 69 BPM

## 2025-07-14 DIAGNOSIS — R30.0 BURNING WITH URINATION: Primary | ICD-10-CM

## 2025-07-14 DIAGNOSIS — N30.00 ACUTE CYSTITIS WITHOUT HEMATURIA: ICD-10-CM

## 2025-07-14 LAB
BILIRUB BLD-MCNC: NEGATIVE MG/DL
CLARITY, POC: CLEAR
COLOR UR: ABNORMAL
EXPIRATION DATE: ABNORMAL
GLUCOSE UR STRIP-MCNC: NEGATIVE MG/DL
KETONES UR QL: NEGATIVE
LEUKOCYTE EST, POC: ABNORMAL
Lab: ABNORMAL
NITRITE UR-MCNC: NEGATIVE MG/ML
PH UR: 6 [PH] (ref 5–8)
PROT UR STRIP-MCNC: NEGATIVE MG/DL
RBC # UR STRIP: NEGATIVE /UL
SP GR UR: 1.01 (ref 1–1.03)
UROBILINOGEN UR QL: ABNORMAL

## 2025-07-14 PROCEDURE — 1160F RVW MEDS BY RX/DR IN RCRD: CPT | Performed by: INTERNAL MEDICINE

## 2025-07-14 PROCEDURE — 99213 OFFICE O/P EST LOW 20 MIN: CPT | Performed by: INTERNAL MEDICINE

## 2025-07-14 PROCEDURE — 3078F DIAST BP <80 MM HG: CPT | Performed by: INTERNAL MEDICINE

## 2025-07-14 PROCEDURE — 1125F AMNT PAIN NOTED PAIN PRSNT: CPT | Performed by: INTERNAL MEDICINE

## 2025-07-14 PROCEDURE — 3074F SYST BP LT 130 MM HG: CPT | Performed by: INTERNAL MEDICINE

## 2025-07-14 PROCEDURE — 1159F MED LIST DOCD IN RCRD: CPT | Performed by: INTERNAL MEDICINE

## 2025-07-14 PROCEDURE — 81003 URINALYSIS AUTO W/O SCOPE: CPT | Performed by: INTERNAL MEDICINE

## 2025-07-14 RX ORDER — NITROFURANTOIN 25; 75 MG/1; MG/1
100 CAPSULE ORAL 2 TIMES DAILY
Qty: 14 CAPSULE | Refills: 0 | Status: SHIPPED | OUTPATIENT
Start: 2025-07-14

## 2025-07-14 NOTE — PROGRESS NOTES
"Chief Complaint  Urinary Frequency (Pt here complains of pain and pressure when urinating, dtr and law gave hgere meds for the pain that changed the color of the urine )    Subjective        Alison Colvin presents to [facility]  Urinary Frequency  Associated symptoms: frequency        History of Present Illness  The patient presents for evaluation of a suspected urinary tract infection.    She reports experiencing frequent urination, described as dribbling every few minutes, accompanied by a tingling sensation and significant pressure in the lower abdomen. She also mentions severe pain and a burning sensation during micturition. These symptoms began on the evening of 07/11/2025. She describes the pain as constant throughout urination, with no presence of blood in the urine. She continues to experience pressure and soreness in the lower abdomen. She has been using phenazopyridine, which provides temporary relief from the pain but does not alleviate the burning sensation during urination. She has a known allergy to Levaquin and sulfa antibiotic. She recalls a previous episode of urinary tract infection that led to sepsis and hospitalization.    She has a history of colitis, which was particularly severe for about 3 weeks prior to the onset of her current symptoms. She believes that consuming a salad may have triggered this flare-up. She did not seek emergency care or hospitalization during this period.      Objective   Vital Signs:  /68 (BP Location: Right arm, Patient Position: Sitting, Cuff Size: Adult)   Pulse 69   Temp 97.1 °F (36.2 °C) (Temporal)   Resp 16   Ht 157.5 cm (62.01\")   Wt 78 kg (172 lb)   SpO2 95%   BMI 31.45 kg/m²   Estimated body mass index is 31.45 kg/m² as calculated from the following:    Height as of this encounter: 157.5 cm (62.01\").    Weight as of this encounter: 78 kg (172 lb).            Physical Exam  Constitutional:       Appearance: Normal " appearance.   HENT:      Head: Normocephalic and atraumatic.   Abdominal:      Tenderness: There is abdominal tenderness in the suprapubic area. There is no right CVA tenderness or left CVA tenderness.   Neurological:      Mental Status: She is alert and oriented to person, place, and time.        Result Review :  The following data was reviewed by: Jackelyn Tidwell MD on 07/14/2025:  Brief Urine Lab Results  (Last result in the past 365 days)        Color   Clarity   Blood   Leuk Est   Nitrite   Protein   CREAT   Urine HCG        07/14/25 [address]   Clear   Negative   [address]   Negative   Negative                        Assessment and Plan   Diagnoses and all orders for this visit:    1. Burning with urination (Primary)  -     POCT urinalysis dipstick, automated    2. Acute cystitis without hematuria  -     nitrofurantoin, macrocrystal-monohydrate, (Macrobid) 100 MG capsule; Take 1 capsule by mouth 2 (Two) Times a Day.  Dispense: 14 capsule; Refill: 0        Assessment & Plan  1. Acute cystitis.  - Symptoms include dribbling, tingling, pressure, and burning pain during urination, which started on 07/11/2025.  - Urinalysis shows significant white blood cells (3+), indicating a urinary tract infection. There is no hematuria.  - Discussed that phenazopyridine (Azo) may relieve pain but does not treat the infection.  - Macrobid 100 mg twice daily for 7 days prescribed. If symptoms persist or recur, a culture will be considered to identify any specific antibiotic resistance.      Follow-up  - The patient will follow up in 09/2025.           Follow Up   Return for Next scheduled follow up.  Patient was given instructions and counseling regarding her condition or for health maintenance advice. Please see specific information pulled into the AVS if appropriate.     Patient or patient representative verbalized consent for the use of Ambient Listening during the visit with  Jackelyn Tidwell MD for chart documentation.  7/14/2025  14:14 EDT

## 2025-07-16 ENCOUNTER — TELEPHONE (OUTPATIENT)
Dept: FAMILY MEDICINE CLINIC | Facility: CLINIC | Age: 75
End: 2025-07-16

## 2025-07-30 ENCOUNTER — TELEPHONE (OUTPATIENT)
Dept: FAMILY MEDICINE CLINIC | Facility: CLINIC | Age: 75
End: 2025-07-30
Payer: MEDICARE

## 2025-08-07 RX ORDER — METOCLOPRAMIDE 10 MG/1
10 TABLET ORAL 4 TIMES DAILY
Qty: 360 TABLET | Refills: 3 | OUTPATIENT
Start: 2025-08-07